# Patient Record
Sex: MALE | Race: WHITE | NOT HISPANIC OR LATINO | Employment: FULL TIME | URBAN - METROPOLITAN AREA
[De-identification: names, ages, dates, MRNs, and addresses within clinical notes are randomized per-mention and may not be internally consistent; named-entity substitution may affect disease eponyms.]

---

## 2018-04-16 RX ORDER — ATORVASTATIN CALCIUM 10 MG/1
10 TABLET, FILM COATED ORAL DAILY
COMMUNITY
Start: 2017-11-10 | End: 2018-04-16 | Stop reason: SDUPTHER

## 2018-04-17 RX ORDER — ATORVASTATIN CALCIUM 10 MG/1
10 TABLET, FILM COATED ORAL DAILY
Qty: 90 TABLET | Refills: 3 | Status: SHIPPED | OUTPATIENT
Start: 2018-04-17 | End: 2019-05-13 | Stop reason: SDUPTHER

## 2018-04-20 PROBLEM — K21.9 GERD (GASTROESOPHAGEAL REFLUX DISEASE): Status: ACTIVE | Noted: 2018-04-20

## 2018-04-20 PROBLEM — R79.83 HYPERHOMOCYSTINEMIA: Status: ACTIVE | Noted: 2018-04-20

## 2018-04-20 PROBLEM — E78.5 DYSLIPIDEMIA: Status: ACTIVE | Noted: 2018-04-20

## 2018-04-20 PROBLEM — I10 HYPERTENSION: Status: ACTIVE | Noted: 2018-04-20

## 2018-04-20 PROBLEM — D68.52 PROTHROMBIN MUTATION (CMS/HCC): Status: ACTIVE | Noted: 2018-04-20

## 2018-04-20 PROBLEM — G47.33 OSA (OBSTRUCTIVE SLEEP APNEA): Status: ACTIVE | Noted: 2018-04-20

## 2018-04-20 PROBLEM — F41.9 ANXIETY DISORDER: Status: ACTIVE | Noted: 2018-04-20

## 2018-04-20 PROBLEM — I25.10 CORONARY ARTERY DISEASE: Status: ACTIVE | Noted: 2018-04-20

## 2018-04-20 RX ORDER — OMEGA-3 FATTY ACIDS 1000 MG
CAPSULE ORAL DAILY
COMMUNITY
Start: 2017-11-01

## 2018-04-20 RX ORDER — CEPHRADINE 500 MG
200 CAPSULE ORAL DAILY
COMMUNITY
Start: 2017-12-13

## 2018-04-20 RX ORDER — ESCITALOPRAM OXALATE 5 MG/1
5 TABLET ORAL DAILY
COMMUNITY
Start: 2017-11-01 | End: 2018-09-12 | Stop reason: HOSPADM

## 2018-04-20 RX ORDER — LEVOMEFOLATE CALCIUM 15 MG
TABLET ORAL DAILY
COMMUNITY
End: 2019-10-01 | Stop reason: ALTCHOICE

## 2018-04-20 RX ORDER — CALCIUM CARBONATE 500(1250)
TABLET ORAL DAILY
COMMUNITY
Start: 2017-11-01 | End: 2020-10-21 | Stop reason: ALTCHOICE

## 2018-04-20 RX ORDER — PANTOPRAZOLE SODIUM 40 MG/1
40 TABLET, DELAYED RELEASE ORAL DAILY
COMMUNITY
Start: 2017-11-01

## 2018-04-20 RX ORDER — ASPIRIN 81 MG/1
81 TABLET ORAL DAILY
COMMUNITY
Start: 2016-12-12

## 2018-04-20 RX ORDER — CHOLECALCIFEROL (VITAMIN D3) 50 MCG
TABLET ORAL DAILY
COMMUNITY
Start: 2017-11-01

## 2018-04-20 RX ORDER — PRENATAL VIT 75/IRON/FOLIC/OM3 28-800-440
250 COMBINATION PACKAGE (EA) ORAL DAILY
COMMUNITY
Start: 2017-12-13

## 2018-04-20 RX ORDER — ACETAMINOPHEN 160 MG/5ML
200 SUSPENSION, ORAL (FINAL DOSE FORM) ORAL DAILY
COMMUNITY
Start: 2017-11-01

## 2018-04-20 NOTE — PROGRESS NOTES
Patient MRN:  [id number]      Patient MRN: [id number]  Date: 2017  Description: Prog Notes ([facility]) Cardiology  Category: Progress Notes ([facility])   Provider ID: [id number]  Practice ID: 0001  E.tdnterprise ID:   [id number]  Jaylon Irvin MD  [address] [address]  [place] [address]    Re:  HILDA LOMAX  :  1955    Dear Jaylon:    It was my pleasure to see your patient, Hilda Lomax, in the [facility] today.  As you know, he was referred for follow-up therapy concerning his subclinical coronary artery disease and dyslipidemia.    The patient has been through a previous cardiac workup by Dr. Santo Tucker at [place].  A coronary calcium score was obtained which revealed significant plaquing of his LAD.  This prompted a stress study which was equivocal for ischemia.  This was followed by a diagnostic left heart catheterization in 2016 which demonstrated moderate nonobstructive LAD disease with was FFR negative.  Medical management was indicated.    The patient has been on a prevention program over the past several years.  He is on Lipitor 10 mg daily.  His most recent lipid panel revealed a total cholesterol of total cholesterol 153 LDL 74 HDL 74 triglycerides 24.  His LP(a) is normal at 56.  This is an excellent response to the use of Lipitor 10 mg daily.    His bioinflammatory markers including HSCRP and LPPLA-2 are normal.  His endothelial function is normal.  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  He has a prothrombin mutation which increases his risk for venous thrombosis.  His homocystine level is elevated at 14.  There is no evidence of insulin resistance.  His ferritin levels are low.  His omega-3 index is 11.8.    We reviewed his medications and supplements in detail and are making no substantial changes today.  He will continue on with prevention program as outlined  above.    PAST MEDICAL HISTORY:  Coronary artery disease, cardiac catheterization in November 2016, moderate stenosis involving the mid LAD, FFR negative, dyslipidemia, well controlled with Lipitor, gastroesophageal reflux disease, osteoarthritis, osteoporosis, basal cell carcinoma of the skin.    PAST SURGICAL HISTORY:  Retinal tear, prostate biopsy, hernia repair.    CURRENT MEDICATIONS:   Current Outpatient Prescriptions:   •  alpha lipoic acid 200 mg capsule, Take 200 mg by mouth once daily., Disp: , Rfl:   •  aspirin (ASPIR-81) 81 mg enteric coated tablet, Take 81 mg by mouth once daily., Disp: , Rfl:   •  atorvastatin (LIPITOR) 10 mg tablet, Take 1 tablet (10 mg total) by mouth daily., Disp: 90 tablet, Rfl: 3  •  cholecalciferol, vitamin D3, (VITAMIN D3) 2,000 unit tablet, Take by mouth daily., Disp: , Rfl:   •  coenzyme Q10 (CO Q-10) 200 mg capsule, Take 200 mg by mouth once daily., Disp: , Rfl:   •  Lactobac no.41-Bifidobact no.7 (PROBIOTIC-10) 70 mg (3 billion cell) capsule, Take by mouth daily., Disp: , Rfl:   •  levomefolate calcium (L-METHYLFOLATE) 15 mg tablet, Take by mouth once daily., Disp: , Rfl:   •  MULTIVIT WITH MINERALS/LUTEIN (MULTIVITAMIN 50 PLUS ORAL), Take by mouth once daily., Disp: , Rfl:   •  omega-3 fatty acids 1,000 mg capsule, Take by mouth once daily. Two capsules daily, Disp: , Rfl:   •  pantoprazole (PROTONIX) 40 mg EC tablet, Take 40 mg by mouth daily., Disp: , Rfl:   •  resveratrol 250 mg capsule, 250 mg., Disp: , Rfl:   •  calcium carbonate (CALCIUM 500) 500 mg calcium (1,250 mg) tablet, Take by mouth once daily., Disp: , Rfl:   •  escitalopram (LEXAPRO) 5 mg tablet, Take 5 mg by mouth once daily., Disp: , Rfl:       SUPPLEMENTS:  Multivitamin, omega-3 fish oil, vitamin-D, Reservitol, methyl-B essentials, probiotic, coenzyme-Q10, lipoic acid, calcium.  We reviewed his supplements in detail today.    ALLERGIES:  No known drug allergies.    FAMILY HISTORY:  Mother with  hypertension, congestive heart failure.  Father with myocardial infarction, hypertension.    SOCIAL HISTORY:  He is .  His wife's name is Lakeshia Presley.  He has one child.  He is an , self-employed.  He does not smoke.  Rare alcohol.  Regular exercise.    REVIEW OF SYSTEMS:  The patient has snoring, uses nasal CPAP, so he does have sleep apnea, GERD, reflux.  He has been on proton pump inhibitors for 15 years.  The remainder of his 12 point review of systems is negative.    PHYSICAL EXAMINATION:  The patient is a middle-aged male in no acute distress.    Weight:  167.  BMI:  28.  Blood pressure: 140/80  HEENT:  Unremarkable.  Neck:  Supple, no JVD.  Lungs:  Clear to auscultation and percussion.  Cardiac:  Regular rate and rhythm.  Abdomen:  Soft, bowel sounds present, no organomegaly.  Extremities:  No edema, pulses intact.  Skin:  Warm and dry.  Neuro:  Alert and oriented X 3.    LABORATORY DATA:      EKG:  Normal sinus rhythm, normal EKG.      Lipid panel:  Total cholesterol is 153 LDL 74 HDL 74 triglycerides 24.       IMPRESSIONS/RECOMMENDATIONS:  1. Coronary artery disease.  The patient has had prior cardiac catheterization which demonstrated nonobstructive LAD coronary artery disease.  The patient needs an aggressive risk factor modification program.  He will continue baby aspirin.  2. Dyslipidemia.  Continue Lipitor.  He has reached goal.  3. 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  The patient is at increased risk for progressive atherosclerosis.  4. Prothrombin mutation.  The patient is at increased risk of DVT.  5. Hyperhomocystinemia.  The patient should continue with B-complex and L5 methyl folate.  6. Obstructive sleep apnea.  The patient will continue nasal CPAP.  7. Borderline hypertension.  8. Anxiety disorder.  9. GERD and irritable bowel syndrome.  The patient was instructed to read the book by Pierre Morales.    Summary: Dandy is demonstrating cardiovascular stability.  We reviewed his  prevention program in detail.  We are making no substantial changes today.    This was a 30 minute patient encounter with greater than 50% time spent in care coordination and counseling.    Sincerely,    Nnamdi Gustafson MD  4/23/2018

## 2018-04-23 ENCOUNTER — OFFICE VISIT (OUTPATIENT)
Dept: CARDIOLOGY | Facility: CLINIC | Age: 63
End: 2018-04-23
Attending: INTERNAL MEDICINE
Payer: COMMERCIAL

## 2018-04-23 VITALS
BODY MASS INDEX: 24.73 KG/M2 | DIASTOLIC BLOOD PRESSURE: 80 MMHG | SYSTOLIC BLOOD PRESSURE: 142 MMHG | HEIGHT: 69 IN | WEIGHT: 167 LBS

## 2018-04-23 DIAGNOSIS — E78.5 DYSLIPIDEMIA: ICD-10-CM

## 2018-04-23 DIAGNOSIS — I10 HYPERTENSION, UNSPECIFIED TYPE: ICD-10-CM

## 2018-04-23 DIAGNOSIS — I25.10 CORONARY ARTERY DISEASE INVOLVING NATIVE CORONARY ARTERY OF NATIVE HEART WITHOUT ANGINA PECTORIS: Primary | ICD-10-CM

## 2018-04-23 PROCEDURE — 93000 ELECTROCARDIOGRAM COMPLETE: CPT | Performed by: INTERNAL MEDICINE

## 2018-04-23 PROCEDURE — 99214 OFFICE O/P EST MOD 30 MIN: CPT | Performed by: INTERNAL MEDICINE

## 2018-04-23 NOTE — LETTER
2018     Jaylon Irvin MD  [address] [facility] [address]  [facility] [address]    Patient: Hilda Lomax   YOB: 1955   Date of Visit: 2018       Dear Dr. Irvin:    Thank you for referring Hilda Lomax to me for evaluation. Below are my notes for this consultation.    If you have questions, please do not hesitate to call me. I look forward to following your patient along with you.         Sincerely,        Nnamdi Gustafson MD        CC: No Recipients  Nnamdi Gustafson MD  2018  5:31 PM  Sign at close encounter  Patient MRN:  [id number]      Patient MRN: [id number]  Date: 2017  Description: Prog Notes ([facility]) Cardiology  Category: Progress Notes ([facility])   Provider ID: [id number]  Practice ID: 0001  E.tdnterprise ID:   [id number]  Jaylon Irvin MD  [address]  [place] [address]    Re:  HILDA LOMAX  :  1955    Dear Jaylon:    It was my pleasure to see your patient, Hilda Lomax, in the [facility] today.  As you know, he was referred for follow-up therapy concerning his subclinical coronary artery disease and dyslipidemia.    The patient has been through a previous cardiac workup by Dr. Santo Tucker at [place].  A coronary calcium score was obtained which revealed significant plaquing of his LAD.  This prompted a stress study which was equivocal for ischemia.  This was followed by a diagnostic left heart catheterization in 2016 which demonstrated moderate nonobstructive LAD disease with was FFR negative.  Medical management was indicated.    The patient has been on a prevention program over the past several years.  He is on Lipitor 10 mg daily.  His most recent lipid panel revealed a total cholesterol of total cholesterol 153 LDL 74 HDL 74 triglycerides 24.  His LP(a) is normal at 56.  This is an excellent response to the use of Lipitor 10 mg daily.    His bioinflammatory markers  including HSCRP and LPPLA-2 are normal.  His endothelial function is normal.  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  He has a prothrombin mutation which increases his risk for venous thrombosis.  His homocystine level is elevated at 14.  There is no evidence of insulin resistance.  His ferritin levels are low.  His omega-3 index is 11.8.    We reviewed his medications and supplements in detail and are making no substantial changes today.  He will continue on with prevention program as outlined above.    PAST MEDICAL HISTORY:  Coronary artery disease, cardiac catheterization in November 2016, moderate stenosis involving the mid LAD, FFR negative, dyslipidemia, well controlled with Lipitor, gastroesophageal reflux disease, osteoarthritis, osteoporosis, basal cell carcinoma of the skin.    PAST SURGICAL HISTORY:  Retinal tear, prostate biopsy, hernia repair.    CURRENT MEDICATIONS:   Current Outpatient Prescriptions:   •  alpha lipoic acid 200 mg capsule, Take 200 mg by mouth once daily., Disp: , Rfl:   •  aspirin (ASPIR-81) 81 mg enteric coated tablet, Take 81 mg by mouth once daily., Disp: , Rfl:   •  atorvastatin (LIPITOR) 10 mg tablet, Take 1 tablet (10 mg total) by mouth daily., Disp: 90 tablet, Rfl: 3  •  cholecalciferol, vitamin D3, (VITAMIN D3) 2,000 unit tablet, Take by mouth daily., Disp: , Rfl:   •  coenzyme Q10 (CO Q-10) 200 mg capsule, Take 200 mg by mouth once daily., Disp: , Rfl:   •  Lactobac no.41-Bifidobact no.7 (PROBIOTIC-10) 70 mg (3 billion cell) capsule, Take by mouth daily., Disp: , Rfl:   •  levomefolate calcium (L-METHYLFOLATE) 15 mg tablet, Take by mouth once daily., Disp: , Rfl:   •  MULTIVIT WITH MINERALS/LUTEIN (MULTIVITAMIN 50 PLUS ORAL), Take by mouth once daily., Disp: , Rfl:   •  omega-3 fatty acids 1,000 mg capsule, Take by mouth once daily. Two capsules daily, Disp: , Rfl:   •  pantoprazole (PROTONIX) 40 mg EC tablet, Take  40 mg by mouth daily., Disp: , Rfl:   •  resveratrol 250 mg capsule, 250 mg., Disp: , Rfl:   •  calcium carbonate (CALCIUM 500) 500 mg calcium (1,250 mg) tablet, Take by mouth once daily., Disp: , Rfl:   •  escitalopram (LEXAPRO) 5 mg tablet, Take 5 mg by mouth once daily., Disp: , Rfl:       SUPPLEMENTS:  Multivitamin, omega-3 fish oil, vitamin-D, Reservitol, methyl-B essentials, probiotic, coenzyme-Q10, lipoic acid, calcium.  We reviewed his supplements in detail today.    ALLERGIES:  No known drug allergies.    FAMILY HISTORY:  Mother with hypertension, congestive heart failure.  Father with myocardial infarction, hypertension.    SOCIAL HISTORY:  He is .  His wife's name is Lakeshia Presley.  He has one child.  He is an , self-employed.  He does not smoke.  Rare alcohol.  Regular exercise.    REVIEW OF SYSTEMS:  The patient has snoring, uses nasal CPAP, so he does have sleep apnea, GERD, reflux.  He has been on proton pump inhibitors for 15 years.  The remainder of his 12 point review of systems is negative.    PHYSICAL EXAMINATION:  The patient is a middle-aged male in no acute distress.    Weight:  167.  BMI:  28.  Blood pressure: 140/80  HEENT:  Unremarkable.  Neck:  Supple, no JVD.  Lungs:  Clear to auscultation and percussion.  Cardiac:  Regular rate and rhythm.  Abdomen:  Soft, bowel sounds present, no organomegaly.  Extremities:  No edema, pulses intact.  Skin:  Warm and dry.  Neuro:  Alert and oriented X 3.    LABORATORY DATA:      EKG:  Normal sinus rhythm, normal EKG.      Lipid panel:  Total cholesterol is 153 LDL 74 HDL 74 triglycerides 24.       IMPRESSIONS/RECOMMENDATIONS:  1. Coronary artery disease.  The patient has had prior cardiac catheterization which demonstrated nonobstructive LAD coronary artery disease.  The patient needs an aggressive risk factor modification program.  He will continue baby aspirin.  2. Dyslipidemia.  Continue Lipitor.  He has reached  goal.  3. 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  The patient is at increased risk for progressive atherosclerosis.  4. Prothrombin mutation.  The patient is at increased risk of DVT.  5. Hyperhomocystinemia.  The patient should continue with B-complex and L5 methyl folate.  6. Obstructive sleep apnea.  The patient will continue nasal CPAP.  7. Borderline hypertension.  8. Anxiety disorder.  9. GERD and irritable bowel syndrome.  The patient was instructed to read the book by Pierre Morales.    Summary: Dandy is demonstrating cardiovascular stability.  We reviewed his prevention program in detail.  We are making no substantial changes today.    This was a 30 minute patient encounter with greater than 50% time spent in care coordination and counseling.    Sincerely,    Nnamdi Gustafson MD  4/23/2018

## 2018-05-18 ENCOUNTER — TELEPHONE (OUTPATIENT)
Dept: CARDIOLOGY | Facility: CLINIC | Age: 63
End: 2018-05-18

## 2018-05-18 NOTE — TELEPHONE ENCOUNTER
Pt had labs done 3 weeks ago, results do not look like they are back yet. Pt states 3 weeks as of yesterday.  Would like results mailed to home.  Thank you!

## 2018-07-18 ENCOUNTER — TELEPHONE (OUTPATIENT)
Dept: PRIMARY CARE | Facility: CLINIC | Age: 63
End: 2018-07-18

## 2018-07-18 RX ORDER — ZOLPIDEM TARTRATE 10 MG/1
10 TABLET ORAL NIGHTLY PRN
Qty: 10 TABLET | Refills: 0 | Status: SHIPPED | OUTPATIENT
Start: 2018-07-18 | End: 2019-01-09

## 2018-07-18 NOTE — TELEPHONE ENCOUNTER
Pt wanted to know if he could have ambien called in for him as he will be traveling this Friday to help with the plane ride.  Pt uses the [facility] on file.

## 2018-07-27 ENCOUNTER — TRANSCRIBE ORDERS (OUTPATIENT)
Dept: SCHEDULING | Age: 63
End: 2018-07-27

## 2018-07-27 DIAGNOSIS — M48.062 SPINAL STENOSIS OF LUMBAR REGION WITH NEUROGENIC CLAUDICATION: Primary | ICD-10-CM

## 2018-07-28 ENCOUNTER — HOSPITAL ENCOUNTER (OUTPATIENT)
Dept: RADIOLOGY | Facility: HOSPITAL | Age: 63
Discharge: HOME | End: 2018-07-28
Attending: PHYSICAL MEDICINE & REHABILITATION
Payer: COMMERCIAL

## 2018-07-28 DIAGNOSIS — M48.062 SPINAL STENOSIS OF LUMBAR REGION WITH NEUROGENIC CLAUDICATION: ICD-10-CM

## 2018-07-28 PROCEDURE — 72148 MRI LUMBAR SPINE W/O DYE: CPT

## 2018-08-23 ENCOUNTER — TRANSCRIBE ORDERS (OUTPATIENT)
Dept: SCHEDULING | Age: 63
End: 2018-08-23

## 2018-08-23 DIAGNOSIS — N28.1 ACQUIRED CYST OF KIDNEY: Primary | ICD-10-CM

## 2018-09-04 ENCOUNTER — HOSPITAL ENCOUNTER (OUTPATIENT)
Dept: RADIOLOGY | Age: 63
Discharge: HOME | End: 2018-09-04
Attending: UROLOGY
Payer: COMMERCIAL

## 2018-09-04 DIAGNOSIS — N28.1 ACQUIRED CYST OF KIDNEY: ICD-10-CM

## 2018-09-04 PROCEDURE — 76775 US EXAM ABDO BACK WALL LIM: CPT

## 2018-09-11 ENCOUNTER — TRANSCRIBE ORDERS (OUTPATIENT)
Dept: SCHEDULING | Age: 63
End: 2018-09-11

## 2018-09-11 DIAGNOSIS — D41.02 NEOPLASM OF UNCERTAIN BEHAVIOR OF LEFT KIDNEY: Primary | ICD-10-CM

## 2018-09-12 ENCOUNTER — TELEPHONE (OUTPATIENT)
Dept: TRANSFER UNIT | Age: 63
End: 2018-09-12

## 2018-09-14 ENCOUNTER — HOSPITAL ENCOUNTER (OUTPATIENT)
Dept: RADIOLOGY | Age: 63
Discharge: HOME | End: 2018-09-14
Attending: UROLOGY
Payer: COMMERCIAL

## 2018-09-14 DIAGNOSIS — D41.02 NEOPLASM OF UNCERTAIN BEHAVIOR OF LEFT KIDNEY: ICD-10-CM

## 2018-09-14 RX ORDER — GADOBUTROL 604.72 MG/ML
7.5 INJECTION INTRAVENOUS ONCE
Status: COMPLETED | OUTPATIENT
Start: 2018-09-14 | End: 2018-09-14

## 2018-09-14 RX ADMIN — GADOBUTROL 7.5 ML: 604.72 INJECTION INTRAVENOUS at 13:26

## 2018-10-03 ENCOUNTER — TELEPHONE (OUTPATIENT)
Dept: CARDIOLOGY | Facility: CLINIC | Age: 63
End: 2018-10-03

## 2018-10-03 ENCOUNTER — TELEPHONE (OUTPATIENT)
Dept: SCHEDULING | Facility: CLINIC | Age: 63
End: 2018-10-03

## 2018-10-03 NOTE — TELEPHONE ENCOUNTER
Dr Gustafson pt call re appt.and labs  Back in April. Per pt did not have referral asking if visit can be checked pt states visits were not covered. Please call pt to discuss   P# [phone]. Pt concern w/ upcoming OV and next labs to be done.

## 2018-10-03 NOTE — TELEPHONE ENCOUNTER
Pt requesting assistance stating he needs approval to have his labs done at [facility]. He is normally capitated to [facility]

## 2018-10-04 NOTE — TELEPHONE ENCOUNTER
Left message. Specialty pharmacy is sear[facility] than his capitated pharmacy he cannot get blood work done at [facility]

## 2018-10-24 ENCOUNTER — TELEPHONE (OUTPATIENT)
Dept: SCHEDULING | Facility: CLINIC | Age: 63
End: 2018-10-24

## 2018-10-25 NOTE — PROGRESS NOTES
A    Advanced  A1  Jaylon Irvin MD  [address]  [address] [place] [address]    Re:  DANDY LOMAX  :  1955    Dear Jaylon:    It was my pleasure to see your patient, Dandy Lomax, in the [facility] today.  As you know, he was referred for follow-up therapy concerning his subclinical coronary artery disease and dyslipidemia.    The patient has been through a previous cardiac workup by Dr. Santo Tucker at [place].  A coronary calcium score was obtained which revealed significant plaquing of his LAD.  This prompted a stress study which was equivocal for ischemia.  This was followed by a diagnostic left heart catheterization in 2016 which demonstrated moderate nonobstructive LAD disease with was FFR negative.  Medical management was indicated.    The patient has been on a prevention program over the past several years.  He is on Lipitor 10 mg daily.  His most recent lipid panel revealed a total cholesterol of total cholesterol 165 LDL 75 HDL 76 triglycerides 32 APO B 75..  His LP(a) is normal.  This is an excellent response to the use of Lipitor 10 mg daily.    His bioinflammatory markers including HSCRP and LPPLA-2 are normal.  His endothelial function is normal.  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  He has a prothrombin mutation which increases his risk for venous thrombosis.  His homocystine level is elevated at 11.  There is no evidence of insulin resistance.  His ferritin levels are low.  His omega-3 index is 10.9.    We reviewed his medications and supplements in detail and are making no substantial changes today.  He will continue on with prevention program as outlined above.    PAST MEDICAL HISTORY:    1. Coronary artery disease, cardiac catheterization in 2016, moderate stenosis involving the mid LAD, FFR negative  2. dyslipidemia, well controlled with Lipitor   3.  gastroesophageal reflux disease  4. osteoarthritis, osteoporosis  5.  basal cell carcinoma of the skin.  6.  Right bundle branch block  7.  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    CURRENT MEDICATIONS:     Current Outpatient Prescriptions:   •  alpha lipoic acid 200 mg capsule, Take 200 mg by mouth once daily., Disp: , Rfl:   •  aspirin (ASPIR-81) 81 mg enteric coated tablet, Take 81 mg by mouth once daily., Disp: , Rfl:   •  atorvastatin (LIPITOR) 10 mg tablet, Take 1 tablet (10 mg total) by mouth daily., Disp: 90 tablet, Rfl: 3  •  calcium carbonate (CALCIUM 500) 500 mg calcium (1,250 mg) tablet, Take by mouth once daily., Disp: , Rfl:   •  cholecalciferol, vitamin D3, (VITAMIN D3) 2,000 unit tablet, Take by mouth daily., Disp: , Rfl:   •  coenzyme Q10 (CO Q-10) 200 mg capsule, Take 200 mg by mouth once daily., Disp: , Rfl:   •  Lactobac no.41-Bifidobact no.7 (PROBIOTIC-10) 70 mg (3 billion cell) capsule, Take by mouth daily., Disp: , Rfl:   •  levomefolate calcium (L-METHYLFOLATE) 15 mg tablet, Take by mouth once daily., Disp: , Rfl:   •  MULTIVIT WITH MINERALS/LUTEIN (MULTIVITAMIN 50 PLUS ORAL), Take by mouth once daily., Disp: , Rfl:   •  omega-3 fatty acids 1,000 mg capsule, Take by mouth once daily. Two capsules daily, Disp: , Rfl:   •  pantoprazole (PROTONIX) 40 mg EC tablet, Take 40 mg by mouth daily. Two capsule every three days , Disp: , Rfl:   •  resveratrol 250 mg capsule, Take 250 mg by mouth daily.  , Disp: , Rfl:   •  zolpidem (AMBIEN) 10 mg tablet, Take 1 tablet (10 mg total) by mouth nightly as needed for sleep., Disp: 10 tablet, Rfl: 0        SUPPLEMENTS:  Multivitamin, omega-3 fish oil, vitamin-D, Reservitol, methyl-B essentials, probiotic, coenzyme-Q10, lipoic acid, calcium.  We reviewed his supplements in detail today.    ALLERGIES:  No known drug allergies.    FAMILY HISTORY:  Mother with hypertension, congestive heart failure.  Father with  myocardial infarction, hypertension.    SOCIAL HISTORY:  He is .  His wife's name is Lakeshia Presley.  He has one child.  He is an , self-employed.  He does not smoke.  Rare alcohol.  Regular exercise.    REVIEW OF SYSTEMS:  The patient has snoring, uses nasal CPAP, so he does have sleep apnea, GERD, reflux.  He has been on proton pump inhibitors for 15 years.  The remainder of his 12 point review of systems is negative.    PHYSICAL EXAMINATION:  The patient is a middle-aged male in no acute distress.    Weight:  160.  BMI:  28.  Blood pressure: 140/80  HEENT:  Unremarkable.  Neck:  Supple, no JVD.  Lungs:  Clear to auscultation and percussion.  Cardiac:  Regular rate and rhythm.  Abdomen:  Soft, bowel sounds present, no organomegaly.  Extremities:  No edema, pulses intact.  Skin:  Warm and dry.  Neuro:  Alert and oriented X 3.    LABORATORY DATA:      EKG:  Normal sinus rhythm, RBBB.      Lipid panel:  Total cholesterol is 165 LDL 75 HDL 76 for glycerides 32 APO B 75.      IMPRESSIONS/RECOMMENDATIONS:  1. Coronary artery disease.  The patient has had prior cardiac catheterization which demonstrated nonobstructive LAD coronary artery disease.  The patient needs an aggressive risk factor modification program.  He will continue baby aspirin.  2. Dyslipidemia.  Continue Lipitor.  He has reached goal.  3. 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  The patient is at increased risk for progressive atherosclerosis.  4. Prothrombin mutation.  The patient is at increased risk of DVT.  5. Hyperhomocystinemia.  The patient should continue with B-complex and L5 methyl folate.  6. Obstructive sleep apnea.  The patient will continue nasal CPAP.  7. Borderline hypertension.  Patient will monitor his blood pressures at home.  Our goal is a blood pressure less than 130/80.  8. Anxiety disorder.  9. GERD and irritable bowel syndrome.  The patient was instructed to read the book by Pierre Morales.  He is complaining of  constipation.    Summary: Dandy is demonstrating cardiovascular stability.  We reviewed his prevention program in detail.  We are making no substantial changes today.    This was a 30 minute patient encounter with greater than 50% time spent in care coordination and counseling.    Sincerely,    Nnamdi Gustafson MD  10/29/2018

## 2018-10-29 ENCOUNTER — OFFICE VISIT (OUTPATIENT)
Dept: CARDIOLOGY | Facility: CLINIC | Age: 63
End: 2018-10-29
Payer: COMMERCIAL

## 2018-10-29 VITALS
WEIGHT: 160 LBS | DIASTOLIC BLOOD PRESSURE: 80 MMHG | HEIGHT: 69 IN | BODY MASS INDEX: 23.7 KG/M2 | SYSTOLIC BLOOD PRESSURE: 142 MMHG

## 2018-10-29 DIAGNOSIS — I25.10 CORONARY ARTERY DISEASE INVOLVING NATIVE CORONARY ARTERY OF NATIVE HEART WITHOUT ANGINA PECTORIS: Primary | ICD-10-CM

## 2018-10-29 DIAGNOSIS — I10 HYPERTENSION, UNSPECIFIED TYPE: ICD-10-CM

## 2018-10-29 DIAGNOSIS — E78.5 DYSLIPIDEMIA: ICD-10-CM

## 2018-10-29 PROCEDURE — 99214 OFFICE O/P EST MOD 30 MIN: CPT | Performed by: INTERNAL MEDICINE

## 2018-10-29 PROCEDURE — 93000 ELECTROCARDIOGRAM COMPLETE: CPT | Performed by: INTERNAL MEDICINE

## 2018-10-29 NOTE — LETTER
2018     Jaylon Irvin MD  [address]  [facility] [place] [address]    Patient: Hilda Lomax   YOB: 1955   Date of Visit: 10/29/2018       Dear Dr. Irvin:    Thank you for referring Hilda Lomax to me for evaluation. Below are my notes for this consultation.    If you have questions, please do not hesitate to call me. I look forward to following your patient along with you.         Sincerely,        Nnamdi Gustafson MD        CC: No Recipients  Nnamdi Gustafson MD  10/29/2018  3:48 PM  Sign at close encounter  A    Advanced  A1  Jaylon Irvin MD  [facility]  [address]  [place] [address]    Re:  HILDA LOMAX  :  1955    Dear Jaylon:    It was my pleasure to see your patient, Hilda Lomax, in the [facility] today.  As you know, he was referred for follow-up therapy concerning his subclinical coronary artery disease and dyslipidemia.    The patient has been through a previous cardiac workup by Dr. Santo Tucker at [facility].  A coronary calcium score was obtained which revealed significant plaquing of his LAD.  This prompted a stress study which was equivocal for ischemia.  This was followed by a diagnostic left heart catheterization in 2016 which demonstrated moderate nonobstructive LAD disease with was FFR negative.  Medical management was indicated.    The patient has been on a prevention program over the past several years.  He is on Lipitor 10 mg daily.  His most recent lipid panel revealed a total cholesterol of total cholesterol 165 LDL 75 HDL 76 triglycerides 32 APO B 75..  His LP(a) is normal.  This is an excellent response to the use of Lipitor 10 mg daily.    His bioinflammatory markers including HSCRP and LPPLA-2 are normal.  His endothelial function is normal.  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  He has a prothrombin mutation  which increases his risk for venous thrombosis.  His homocystine level is elevated at 11.  There is no evidence of insulin resistance.  His ferritin levels are low.  His omega-3 index is 10.9.    We reviewed his medications and supplements in detail and are making no substantial changes today.  He will continue on with prevention program as outlined above.    PAST MEDICAL HISTORY:    1. Coronary artery disease, cardiac catheterization in November 2016, moderate stenosis involving the mid LAD, FFR negative  2. dyslipidemia, well controlled with Lipitor   3. gastroesophageal reflux disease  4. osteoarthritis, osteoporosis  5.  basal cell carcinoma of the skin.  6.  Right bundle branch block  7.  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    CURRENT MEDICATIONS:     Current Outpatient Prescriptions:   •  alpha lipoic acid 200 mg capsule, Take 200 mg by mouth once daily., Disp: , Rfl:   •  aspirin (ASPIR-81) 81 mg enteric coated tablet, Take 81 mg by mouth once daily., Disp: , Rfl:   •  atorvastatin (LIPITOR) 10 mg tablet, Take 1 tablet (10 mg total) by mouth daily., Disp: 90 tablet, Rfl: 3  •  calcium carbonate (CALCIUM 500) 500 mg calcium (1,250 mg) tablet, Take by mouth once daily., Disp: , Rfl:   •  cholecalciferol, vitamin D3, (VITAMIN D3) 2,000 unit tablet, Take by mouth daily., Disp: , Rfl:   •  coenzyme Q10 (CO Q-10) 200 mg capsule, Take 200 mg by mouth once daily., Disp: , Rfl:   •  Lactobac no.41-Bifidobact no.7 (PROBIOTIC-10) 70 mg (3 billion cell) capsule, Take by mouth daily., Disp: , Rfl:   •  levomefolate calcium (L-METHYLFOLATE) 15 mg tablet, Take by mouth once daily., Disp: , Rfl:   •  MULTIVIT WITH MINERALS/LUTEIN (MULTIVITAMIN 50 PLUS ORAL), Take by mouth once daily., Disp: , Rfl:   •  omega-3 fatty acids 1,000 mg capsule, Take by mouth once daily. Two capsules daily, Disp: , Rfl:   •  pantoprazole (PROTONIX) 40 mg EC tablet, Take 40 mg by mouth daily.  Two capsule every three days , Disp: , Rfl:   •  resveratrol 250 mg capsule, Take 250 mg by mouth daily.  , Disp: , Rfl:   •  zolpidem (AMBIEN) 10 mg tablet, Take 1 tablet (10 mg total) by mouth nightly as needed for sleep., Disp: 10 tablet, Rfl: 0        SUPPLEMENTS:  Multivitamin, omega-3 fish oil, vitamin-D, Reservitol, methyl-B essentials, probiotic, coenzyme-Q10, lipoic acid, calcium.  We reviewed his supplements in detail today.    ALLERGIES:  No known drug allergies.    FAMILY HISTORY:  Mother with hypertension, congestive heart failure.  Father with myocardial infarction, hypertension.    SOCIAL HISTORY:  He is .  His wife's name is Lakeshia Presley.  He has one child.  He is an , self-employed.  He does not smoke.  Rare alcohol.  Regular exercise.    REVIEW OF SYSTEMS:  The patient has snoring, uses nasal CPAP, so he does have sleep apnea, GERD, reflux.  He has been on proton pump inhibitors for 15 years.  The remainder of his 12 point review of systems is negative.    PHYSICAL EXAMINATION:  The patient is a middle-aged male in no acute distress.    Weight:  160.  BMI:  28.  Blood pressure: 140/80  HEENT:  Unremarkable.  Neck:  Supple, no JVD.  Lungs:  Clear to auscultation and percussion.  Cardiac:  Regular rate and rhythm.  Abdomen:  Soft, bowel sounds present, no organomegaly.  Extremities:  No edema, pulses intact.  Skin:  Warm and dry.  Neuro:  Alert and oriented X 3.    LABORATORY DATA:      EKG:  Normal sinus rhythm, RBBB.      Lipid panel:  Total cholesterol is 165 LDL 75 HDL 76 for glycerides 32 APO B 75.      IMPRESSIONS/RECOMMENDATIONS:  1. Coronary artery disease.  The patient has had prior cardiac catheterization which demonstrated nonobstructive LAD coronary artery disease.  The patient needs an aggressive risk factor modification program.  He will continue baby aspirin.  2. Dyslipidemia.  Continue Lipitor.  He has reached goal.  3. 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  The patient  is at increased risk for progressive atherosclerosis.  4. Prothrombin mutation.  The patient is at increased risk of DVT.  5. Hyperhomocystinemia.  The patient should continue with B-complex and L5 methyl folate.  6. Obstructive sleep apnea.  The patient will continue nasal CPAP.  7. Borderline hypertension.  Patient will monitor his blood pressures at home.  Our goal is a blood pressure less than 130/80.  8. Anxiety disorder.  9. GERD and irritable bowel syndrome.  The patient was instructed to read the book by Pierre Morales.  He is complaining of constipation.    Summary: Dandy is demonstrating cardiovascular stability.  We reviewed his prevention program in detail.  We are making no substantial changes today.    This was a 30 minute patient encounter with greater than 50% time spent in care coordination and counseling.    Sincerely,    Nnamdi Gustafson MD  10/29/2018

## 2018-10-29 NOTE — TELEPHONE ENCOUNTER
Dr Gustafson pt request THD results emailed to pt . Pt. Has  appt. Today wants to see results prior to OV Pt. contact [phone]

## 2018-11-21 ENCOUNTER — TELEPHONE (OUTPATIENT)
Dept: PRIMARY CARE | Facility: CLINIC | Age: 63
End: 2018-11-21

## 2018-11-21 DIAGNOSIS — Z00.00 PHYSICAL EXAM, ANNUAL: Primary | ICD-10-CM

## 2019-01-03 LAB
BASOPHILS # BLD AUTO: 0.1 X10E3/UL (ref 0–0.2)
BASOPHILS NFR BLD AUTO: 1 %
EOSINOPHIL # BLD AUTO: 0.1 X10E3/UL (ref 0–0.4)
EOSINOPHIL NFR BLD AUTO: 2 %
ERYTHROCYTE [DISTWIDTH] IN BLOOD BY AUTOMATED COUNT: 13.2 % (ref 12.3–15.4)
HCT VFR BLD AUTO: 42.9 % (ref 37.5–51)
HGB BLD-MCNC: 14.6 G/DL (ref 13–17.7)
IMM GRANULOCYTES # BLD: 0 X10E3/UL (ref 0–0.1)
IMM GRANULOCYTES NFR BLD: 0 %
LYMPHOCYTES # BLD AUTO: 1.1 X10E3/UL (ref 0.7–3.1)
LYMPHOCYTES NFR BLD AUTO: 23 %
MCH RBC QN AUTO: 29.8 PG (ref 26.6–33)
MCHC RBC AUTO-ENTMCNC: 34 G/DL (ref 31.5–35.7)
MCV RBC AUTO: 88 FL (ref 79–97)
MONOCYTES # BLD AUTO: 0.4 X10E3/UL (ref 0.1–0.9)
MONOCYTES NFR BLD AUTO: 8 %
NEUTROPHILS # BLD AUTO: 3 X10E3/UL (ref 1.4–7)
NEUTROPHILS NFR BLD AUTO: 66 %
PLATELET # BLD AUTO: 231 X10E3/UL (ref 150–379)
RBC # BLD AUTO: 4.9 X10E6/UL (ref 4.14–5.8)
WBC # BLD AUTO: 4.5 X10E3/UL (ref 3.4–10.8)

## 2019-01-04 LAB
ALBUMIN SERPL-MCNC: 4.6 G/DL (ref 3.6–4.8)
ALBUMIN/GLOB SERPL: 2.4 {RATIO} (ref 1.2–2.2)
ALP SERPL-CCNC: 59 IU/L (ref 39–117)
ALT SERPL-CCNC: 19 IU/L (ref 0–44)
APPEARANCE UR: CLEAR
AST SERPL-CCNC: 23 IU/L (ref 0–40)
BACTERIA #/AREA URNS HPF: NORMAL /[HPF]
BILIRUB SERPL-MCNC: 0.6 MG/DL (ref 0–1.2)
BILIRUB UR QL STRIP: NEGATIVE
BUN SERPL-MCNC: 14 MG/DL (ref 8–27)
BUN/CREAT SERPL: 13 (ref 10–24)
CALCIUM SERPL-MCNC: 9.4 MG/DL (ref 8.6–10.2)
CHLORIDE SERPL-SCNC: 100 MMOL/L (ref 96–106)
CHOLEST SERPL-MCNC: 141 MG/DL (ref 100–199)
CO2 SERPL-SCNC: 28 MMOL/L (ref 20–29)
COLOR UR: YELLOW
CREAT SERPL-MCNC: 1.06 MG/DL (ref 0.76–1.27)
EPI CELLS #/AREA URNS HPF: NORMAL /HPF
GLOBULIN SER CALC-MCNC: 1.9 G/DL (ref 1.5–4.5)
GLUCOSE SERPL-MCNC: 94 MG/DL (ref 65–99)
GLUCOSE UR QL: NEGATIVE
HDLC SERPL-MCNC: 76 MG/DL
HGB UR QL STRIP: NEGATIVE
KETONES UR QL STRIP: NEGATIVE
LAB CORP EGFR IF AFRICN AM: 85 ML/MIN/1.73
LAB CORP EGFR IF NONAFRICN AM: 74 ML/MIN/1.73
LDLC SERPL CALC-MCNC: 58 MG/DL (ref 0–99)
LEUKOCYTE ESTERASE UR QL STRIP: NEGATIVE
MICRO URNS: (no result)
MICRO URNS: (no result)
MUCOUS THREADS URNS QL MICRO: PRESENT
NITRITE UR QL STRIP: NEGATIVE
PH UR STRIP: 8 [PH] (ref 5–7.5)
POTASSIUM SERPL-SCNC: 4.8 MMOL/L (ref 3.5–5.2)
PROT SERPL-MCNC: 6.5 G/DL (ref 6–8.5)
PROT UR QL STRIP: NEGATIVE
PSA SERPL-MCNC: 2.1 NG/ML (ref 0–4)
RBC #/AREA URNS HPF: NORMAL /HPF
SODIUM SERPL-SCNC: 139 MMOL/L (ref 134–144)
SP GR UR: 1.01 (ref 1–1.03)
TRIGL SERPL-MCNC: 36 MG/DL (ref 0–149)
UROBILINOGEN UR STRIP-MCNC: 0.2 EU/DL (ref 0.2–1)
VLDLC SERPL CALC-MCNC: 7 MG/DL (ref 5–40)
WBC #/AREA URNS HPF: NORMAL /HPF

## 2019-01-09 ENCOUNTER — OFFICE VISIT (OUTPATIENT)
Dept: PRIMARY CARE | Facility: CLINIC | Age: 64
End: 2019-01-09
Attending: INTERNAL MEDICINE
Payer: COMMERCIAL

## 2019-01-09 VITALS
BODY MASS INDEX: 22.65 KG/M2 | WEIGHT: 161.8 LBS | SYSTOLIC BLOOD PRESSURE: 140 MMHG | OXYGEN SATURATION: 98 % | HEART RATE: 75 BPM | DIASTOLIC BLOOD PRESSURE: 80 MMHG | TEMPERATURE: 98.3 F | RESPIRATION RATE: 14 BRPM | HEIGHT: 71 IN

## 2019-01-09 DIAGNOSIS — Z00.00 PHYSICAL EXAM, ANNUAL: Primary | ICD-10-CM

## 2019-01-09 DIAGNOSIS — E78.5 DYSLIPIDEMIA: ICD-10-CM

## 2019-01-09 DIAGNOSIS — R59.9 ENLARGED LYMPH NODE: ICD-10-CM

## 2019-01-09 DIAGNOSIS — I10 ESSENTIAL HYPERTENSION: ICD-10-CM

## 2019-01-09 PROBLEM — G47.00 INSOMNIA: Status: ACTIVE | Noted: 2017-06-08

## 2019-01-09 PROBLEM — M65.341 TRIGGER RING FINGER OF RIGHT HAND: Status: ACTIVE | Noted: 2018-06-13

## 2019-01-09 PROBLEM — I83.93 VARICOSE VEINS OF LEGS: Status: ACTIVE | Noted: 2017-06-08

## 2019-01-09 PROBLEM — R79.89 LOW VITAMIN D LEVEL: Status: ACTIVE | Noted: 2017-06-08

## 2019-01-09 PROCEDURE — 99396 PREV VISIT EST AGE 40-64: CPT | Performed by: INTERNAL MEDICINE

## 2019-01-09 RX ORDER — ESCITALOPRAM OXALATE 10 MG/1
10 TABLET ORAL
Refills: 1 | COMMUNITY
Start: 2018-12-18 | End: 2019-10-01

## 2019-01-09 RX ORDER — ZOLPIDEM TARTRATE 10 MG/1
10 TABLET ORAL NIGHTLY PRN
Refills: 0 | COMMUNITY
Start: 2018-12-20 | End: 2019-11-18

## 2019-01-09 ASSESSMENT — ENCOUNTER SYMPTOMS
SHORTNESS OF BREATH: 0
ARTHRALGIAS: 0
VOMITING: 0
FLANK PAIN: 0
ABDOMINAL PAIN: 0
FEVER: 0
CONSTIPATION: 0
ADENOPATHY: 0
AGITATION: 0
COLOR CHANGE: 0
EYE PAIN: 0
BACK PAIN: 0
DIARRHEA: 0
CHILLS: 0
SORE THROAT: 0
NAUSEA: 0
DYSURIA: 0
DIZZINESS: 0
CHEST TIGHTNESS: 0
COUGH: 0
SLEEP DISTURBANCE: 0
PALPITATIONS: 0

## 2019-01-09 NOTE — PROGRESS NOTES
Subjective      Patient ID: Dandy Lomax is a 63 y.o. male.    HPI    For Physical Exam.  Had labs done already.  Reviewed with patient.  Follows Dr Rojas.  Getting Lexapro, Ativan and Ambien.  Not getting as much exercise.  Uses eliptical riana and weights.  Has an enlarged lymph node on the left side of his neck for the past 2 months.  Saw ENT and will be getting ultrasound.      BP well controlled.  BMI is normal.      Family history reviewed.     Does not smoke.  Drinks occasionally.     Vaccinations up to date.     Colonoscopy 1/2017. Repeat 5 years.  At [facility].     Eye Exam regularly.     Dentist regularly.     Dermatology -- Dr Reid.         The following have been reviewed and updated as appropriate in this visit:  Tobacco  Allergies  Meds  Problems  Med Hx  Surg Hx  Fam Hx  Soc Hx        Patient Active Problem List   Diagnosis   • Coronary artery disease   • Dyslipidemia   • Prothrombin mutation (CMS/HCC) (HCC)   • Hyperhomocystinemia (CMS/HCC) (HCC)   • MERY (obstructive sleep apnea)   • Hypertension   • Anxiety disorder   • GERD (gastroesophageal reflux disease)   • Age-related osteoporosis without current pathological fracture   • Varicose veins of legs   • Trigger ring finger of right hand   • Osteoporosis   • Low vitamin D level   • Knee pain, bilateral   • Insomnia   • Cyclic neutropenia (CMS/HCC)     History reviewed. No pertinent surgical history.  Family History   Problem Relation Age of Onset   • Hypertension Mother    • Heart failure Mother    • Uterine cancer Mother    • Heart attack Father    • Hypertension Father    • COPD Father    • No Known Problems Sister      Social History     Social History   • Marital status:      Spouse name: N/A   • Number of children: 1   • Years of education: N/A     Occupational History   •       Social History Main Topics   • Smoking status: Never Smoker   • Smokeless tobacco: Never Used   • Alcohol use Yes       Comment: Rare alcohol   • Drug use: Unknown   • Sexual activity: Not on file     Other Topics Concern   • Not on file     Social History Narrative    One son, 10 y/o.            Review of Systems   Constitutional: Negative for chills and fever.   HENT: Negative for hearing loss and sore throat.    Eyes: Negative for pain.   Respiratory: Negative for cough, chest tightness and shortness of breath.    Cardiovascular: Negative for chest pain and palpitations.   Gastrointestinal: Negative for abdominal pain, constipation, diarrhea, nausea and vomiting.   Endocrine: Negative for cold intolerance and polyuria.   Genitourinary: Negative for dysuria and flank pain.   Musculoskeletal: Negative for arthralgias and back pain.   Skin: Negative for color change.   Allergic/Immunologic: Negative for environmental allergies.   Neurological: Negative for dizziness.   Hematological: Negative for adenopathy.   Psychiatric/Behavioral: Negative for agitation and sleep disturbance.       No Known Allergies  Current Outpatient Prescriptions   Medication Sig Dispense Refill   • alpha lipoic acid 200 mg capsule Take 200 mg by mouth once daily.     • aspirin (ASPIR-81) 81 mg enteric coated tablet Take 81 mg by mouth once daily.     • atorvastatin (LIPITOR) 10 mg tablet Take 1 tablet (10 mg total) by mouth daily. 90 tablet 3   • calcium carbonate (CALCIUM 500) 500 mg calcium (1,250 mg) tablet Take by mouth once daily.     • cholecalciferol, vitamin D3, (VITAMIN D3) 2,000 unit tablet Take by mouth daily.     • coenzyme Q10 (CO Q-10) 200 mg capsule Take 200 mg by mouth once daily.     • escitalopram (LEXAPRO) 10 mg tablet Take 10 mg by mouth once daily.  1   • Lactobac no.41-Bifidobact no.7 (PROBIOTIC-10) 70 mg (3 billion cell) capsule Take by mouth daily.     • levomefolate calcium (L-METHYLFOLATE) 15 mg tablet Take by mouth once daily.     • MULTIVIT WITH MINERALS/LUTEIN (MULTIVITAMIN 50 PLUS ORAL) Take by mouth once daily.     • omega-3  "fatty acids 1,000 mg capsule Take by mouth once daily. Two capsules daily     • pantoprazole (PROTONIX) 40 mg EC tablet Take 40 mg by mouth daily. Two capsule every three days      • resveratrol 250 mg capsule Take 250 mg by mouth daily.       • zolpidem (AMBIEN) 10 mg tablet Take 10 mg by mouth nightly as needed. for sleep  0     No current facility-administered medications for this visit.        Objective   Vitals:    01/09/19 1412   BP: 140/80   Pulse: 75   Resp: 14   Temp: 36.8 °C (98.3 °F)   SpO2: 98%   Weight: 73.4 kg (161 lb 12.8 oz)   Height: 1.791 m (5' 10.5\")       Physical Exam   Constitutional: He is oriented to person, place, and time. He appears well-developed.   HENT:   Head: Normocephalic and atraumatic.   Nose: Nose normal.   Eyes: Conjunctivae and EOM are normal. Pupils are equal, round, and reactive to light.   Neck: Normal range of motion. Neck supple.   Cardiovascular: Normal rate and regular rhythm.    Pulmonary/Chest: Effort normal and breath sounds normal.   Abdominal: Soft. Bowel sounds are normal. There is no tenderness.   Musculoskeletal: Normal range of motion.   Neurological: He is alert and oriented to person, place, and time.   Skin: Skin is warm and dry.   Psychiatric: He has a normal mood and affect. Judgment normal.   Nursing note and vitals reviewed.      Assessment/Plan   Problem List Items Addressed This Visit     Dyslipidemia    Hypertension      Other Visit Diagnoses     Physical exam, annual    -  Primary    Enlarged lymph node          Routine blood work today.      Recommend regular exercise and healthy diet.     Pt will get ultrasound of neck for his lump down [address].          Jaylon Irvin MD    1/9/2019  "

## 2019-03-22 ENCOUNTER — TELEPHONE (OUTPATIENT)
Dept: CARDIOLOGY | Facility: CLINIC | Age: 64
End: 2019-03-22

## 2019-03-22 NOTE — TELEPHONE ENCOUNTER
PT called with question    Is taking Vitamin B supplement and wishes to know if he should take FOLATE.    Is taking 800 mcg of FOLATE daily and vitamins from old cardiologist    Wants guidance on what to take vitamin wise.    Believes he is taking too much folate.    PT can be reached at [phone]

## 2019-04-01 ENCOUNTER — TELEPHONE (OUTPATIENT)
Dept: SCHEDULING | Facility: CLINIC | Age: 64
End: 2019-04-01

## 2019-04-01 NOTE — TELEPHONE ENCOUNTER
Pt request THD order put in Epic to have done thurs.   Pt has appt 4/29 needs completing before OV. P#[phone]

## 2019-04-01 NOTE — TELEPHONE ENCOUNTER
Estephanie, please fill form for THD and call pt to let him know to come over to Lab and get it done.

## 2019-04-02 NOTE — TELEPHONE ENCOUNTER
Addendum:    Filled out THD form and it is in an envelope at the  in zone A mezzanine level in the heart pavilion.

## 2019-04-03 ENCOUNTER — TELEPHONE (OUTPATIENT)
Dept: SCHEDULING | Facility: CLINIC | Age: 64
End: 2019-04-03

## 2019-04-03 NOTE — TELEPHONE ENCOUNTER
Pt requesting to speak with the nurse regarding labs; pt would like further instruction for testing for fasting. Pt can be reached at .

## 2019-04-26 NOTE — PROGRESS NOTES
[facility]    2019  Jaylon Irvin MD  [address]  [address]  [place] [address]    Re:  HILDA LOMAX  :  1955    Dear Jaylon:    It was my pleasure to see your patient, Hilda Lomax, in the [facility] today.  As you know, he was referred for follow-up therapy concerning his subclinical coronary artery disease and dyslipidemia.    The patient has been through a previous cardiac workup by Dr. Santo Tucker at [facility].  A coronary calcium score was obtained which revealed significant plaquing of his LAD.  This prompted a stress study which was equivocal for ischemia.  This was followed by a diagnostic left heart catheterization in 2016 which demonstrated moderate nonobstructive LAD disease with was FFR negative.  Medical management was indicated.    The patient has been on a prevention program over the past several years.  He is on Lipitor 10 mg daily.  His most recent lipid panel revealed a Total cholesterol 153 LDL 72 HDL 77 triglycerides 30 APO B 72. His LP(a) is normal.  This is an excellent response to the use of Lipitor 10 mg daily.    His bioinflammatory markers including HSCRP and LPPLA-2 are normal.  His endothelial function is normal.  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  He has a prothrombin mutation which increases his risk for venous thrombosis.  His homocystine level is elevated at 11.  There is no evidence of insulin resistance.  His ferritin levels are low.  His omega-3 index is 10.9.    We reviewed his medications and supplements in detail and are making no substantial changes today.  He will continue on with prevention program as outlined above.    PAST MEDICAL HISTORY:    1. Coronary artery disease, cardiac catheterization in 2016, moderate stenosis involving the mid LAD, FFR negative  2. dyslipidemia, well controlled  with Lipitor   3. gastroesophageal reflux disease  4. osteoarthritis, osteoporosis  5.  basal cell carcinoma of the skin.  6.  Right bundle branch block  7.  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    CURRENT MEDICATIONS:     Current Outpatient Prescriptions:   •  alpha lipoic acid 200 mg capsule, Take 200 mg by mouth once daily., Disp: , Rfl:   •  aspirin (ASPIR-81) 81 mg enteric coated tablet, Take 81 mg by mouth once daily., Disp: , Rfl:   •  atorvastatin (LIPITOR) 10 mg tablet, Take 1 tablet (10 mg total) by mouth daily., Disp: 90 tablet, Rfl: 3  •  calcium carbonate (CALCIUM 500) 500 mg calcium (1,250 mg) tablet, Take by mouth once daily., Disp: , Rfl:   •  cholecalciferol, vitamin D3, (VITAMIN D3) 2,000 unit tablet, Take by mouth daily., Disp: , Rfl:   •  coenzyme Q10 (CO Q-10) 200 mg capsule, Take 200 mg by mouth once daily., Disp: , Rfl:   •  escitalopram (LEXAPRO) 10 mg tablet, Take 10 mg by mouth once daily., Disp: , Rfl: 1  •  Lactobac no.41-Bifidobact no.7 (PROBIOTIC-10) 70 mg (3 billion cell) capsule, Take by mouth daily., Disp: , Rfl:   •  levomefolate calcium (L-METHYLFOLATE) 15 mg tablet, Take by mouth once daily., Disp: , Rfl:   •  MULTIVIT WITH MINERALS/LUTEIN (MULTIVITAMIN 50 PLUS ORAL), Take by mouth once daily., Disp: , Rfl:   •  omega-3 fatty acids 1,000 mg capsule, Take by mouth once daily. Two capsules daily, Disp: , Rfl:   •  pantoprazole (PROTONIX) 40 mg EC tablet, Take 40 mg by mouth daily. Two capsule every three days , Disp: , Rfl:   •  resveratrol 250 mg capsule, Take 250 mg by mouth daily.  , Disp: , Rfl:   •  zolpidem (AMBIEN) 10 mg tablet, Take 10 mg by mouth nightly as needed. for sleep, Disp: , Rfl: 0  •  losartan (COZAAR) 50 mg tablet, Take 1 tablet (50 mg total) by mouth daily., Disp: 90 tablet, Rfl: 3    SUPPLEMENTS:  Multivitamin, omega-3 fish oil, vitamin-D, Reservitol, methyl-B essentials, probiotic, coenzyme-Q10, lipoic acid,  calcium.  We reviewed his supplements in detail today.    ALLERGIES:  No known drug allergies.    FAMILY HISTORY:  Mother with hypertension, congestive heart failure.  Father with myocardial infarction, hypertension.    SOCIAL HISTORY:  He is .  His wife's name is Lakeshia Presley.  He has one child.  He is an , self-employed.  He does not smoke.  Rare alcohol.  Regular exercise.    REVIEW OF SYSTEMS:  The patient has snoring, uses nasal CPAP, so he does have sleep apnea, GERD, reflux.  He has been on proton pump inhibitors for 15 years.  The remainder of his 12 point review of systems is negative.    PHYSICAL EXAMINATION:  The patient is a middle-aged male in no acute distress.    Weight:  162  BMI:  22.9.  Blood pressure: 150/90  HEENT:  Unremarkable.  Neck:  Supple, no JVD.  Lungs:  Clear to auscultation and percussion.  Cardiac:  Regular rate and rhythm.  Abdomen:  Soft, bowel sounds present, no organomegaly.  Extremities:  No edema, pulses intact.  Skin:  Warm and dry.  Neuro:  Alert and oriented X 3.    LABORATORY DATA:      EKG:  Normal sinus rhythm, RBBB.       Advanced lipid panel: April 2019:  Total cholesterol 153 LDL 72 HDL 77 triglycerides 30 APO B 72 LP(a) 64  Inflammation panel: Normal  Endothelial function panel: Normal  Diabetes panel: Normal  Metabolic panel: Normal.  Vitamin D 66 homocystine 11  Omega-3 index 10.6  Sterol panel: Normal  Renal function panel: GFR 74      IMPRESSIONS/RECOMMENDATIONS:  1. Coronary artery disease.  The patient has had prior cardiac catheterization which demonstrated nonobstructive LAD coronary artery disease.  The patient needs an aggressive risk factor modification program.  He will continue baby aspirin.  2. Dyslipidemia.  Continue Lipitor.  He has reached goal.  3. 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  The patient is at increased risk for progressive atherosclerosis.  4. Prothrombin mutation.  The patient is at increased risk of  DVT.  5. Hyperhomocystinemia.  The patient should continue with B-complex and L5 methyl folate.  6. Obstructive sleep apnea.  The patient will continue nasal CPAP.  7. Borderline hypertension.  Patient will monitor his blood pressures at home.  Our goal is a blood pressure less than 130/80.  Today he was started on losartan 50 mg daily.  8. Anxiety disorder.  9. GERD and irritable bowel syndrome.  Stable    Summary: Dandy is demonstrating cardiovascular stability.  Today we initiated losartan 50 mg daily for mild hypertension.  We reviewed his prevention program in detail.  We are making no substantial changes today.    This was a 30 minute patient encounter with greater than 50% time spent in care coordination and counseling.    Sincerely,    Nnamdi Gustafson MD  4/29/2019

## 2019-04-29 ENCOUNTER — OFFICE VISIT (OUTPATIENT)
Dept: CARDIOLOGY | Facility: CLINIC | Age: 64
End: 2019-04-29
Payer: COMMERCIAL

## 2019-04-29 VITALS — HEIGHT: 71 IN | WEIGHT: 162 LBS | BODY MASS INDEX: 22.68 KG/M2

## 2019-04-29 DIAGNOSIS — I10 ESSENTIAL HYPERTENSION: ICD-10-CM

## 2019-04-29 DIAGNOSIS — E78.5 DYSLIPIDEMIA: ICD-10-CM

## 2019-04-29 DIAGNOSIS — I25.10 CORONARY ARTERY DISEASE INVOLVING NATIVE CORONARY ARTERY OF NATIVE HEART WITHOUT ANGINA PECTORIS: Primary | ICD-10-CM

## 2019-04-29 DIAGNOSIS — G47.33 OSA (OBSTRUCTIVE SLEEP APNEA): ICD-10-CM

## 2019-04-29 DIAGNOSIS — R79.89 LOW VITAMIN D LEVEL: ICD-10-CM

## 2019-04-29 PROCEDURE — 93000 ELECTROCARDIOGRAM COMPLETE: CPT | Performed by: INTERNAL MEDICINE

## 2019-04-29 PROCEDURE — 99214 OFFICE O/P EST MOD 30 MIN: CPT | Performed by: INTERNAL MEDICINE

## 2019-04-29 RX ORDER — LOSARTAN POTASSIUM 50 MG/1
50 TABLET ORAL DAILY
Qty: 90 TABLET | Refills: 3 | Status: SHIPPED | OUTPATIENT
Start: 2019-04-29 | End: 2020-04-21 | Stop reason: SDUPTHER

## 2019-04-29 NOTE — LETTER
2019     Jaylon Irvin MD  [address] [facility] [address]  [facility] [place] [address]    Patient: Hilda Lomax   YOB: 1955   Date of Visit: 2019       Dear Dr. Irvin:    Thank you for referring Hilda Lomax to me for evaluation. Below are my notes for this consultation.    If you have questions, please do not hesitate to call me. I look forward to following your patient along with you.         Sincerely,        Nnamdi Gustafson MD        CC: No Recipients  Nnamdi Gustafson MD  2019  2:29 PM  Sign at close encounter                                       [facility]    2019  Jaylon Irvin MD  [address]  [place] [address]    Re:  HILDA LOMAX  :  1955    Dear Jaylon:    It was my pleasure to see your patient, Hilda Lomax, in the [facility] today.  As you know, he was referred for follow-up therapy concerning his subclinical coronary artery disease and dyslipidemia.    The patient has been through a previous cardiac workup by Dr. Santo Tucker at [facility].  A coronary calcium score was obtained which revealed significant plaquing of his LAD.  This prompted a stress study which was equivocal for ischemia.  This was followed by a diagnostic left heart catheterization in 2016 which demonstrated moderate nonobstructive LAD disease with was FFR negative.  Medical management was indicated.    The patient has been on a prevention program over the past several years.  He is on Lipitor 10 mg daily.  His most recent lipid panel revealed a Total cholesterol 153 LDL 72 HDL 77 triglycerides 30 APO B 72. His LP(a) is normal.  This is an excellent response to the use of Lipitor 10 mg daily.    His bioinflammatory markers including HSCRP and LPPLA-2 are normal.  His endothelial function is normal.  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  He has a  prothrombin mutation which increases his risk for venous thrombosis.  His homocystine level is elevated at 11.  There is no evidence of insulin resistance.  His ferritin levels are low.  His omega-3 index is 10.9.    We reviewed his medications and supplements in detail and are making no substantial changes today.  He will continue on with prevention program as outlined above.    PAST MEDICAL HISTORY:    1. Coronary artery disease, cardiac catheterization in November 2016, moderate stenosis involving the mid LAD, FFR negative  2. dyslipidemia, well controlled with Lipitor   3. gastroesophageal reflux disease  4. osteoarthritis, osteoporosis  5.  basal cell carcinoma of the skin.  6.  Right bundle branch block  7.  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    CURRENT MEDICATIONS:     Current Outpatient Prescriptions:   •  alpha lipoic acid 200 mg capsule, Take 200 mg by mouth once daily., Disp: , Rfl:   •  aspirin (ASPIR-81) 81 mg enteric coated tablet, Take 81 mg by mouth once daily., Disp: , Rfl:   •  atorvastatin (LIPITOR) 10 mg tablet, Take 1 tablet (10 mg total) by mouth daily., Disp: 90 tablet, Rfl: 3  •  calcium carbonate (CALCIUM 500) 500 mg calcium (1,250 mg) tablet, Take by mouth once daily., Disp: , Rfl:   •  cholecalciferol, vitamin D3, (VITAMIN D3) 2,000 unit tablet, Take by mouth daily., Disp: , Rfl:   •  coenzyme Q10 (CO Q-10) 200 mg capsule, Take 200 mg by mouth once daily., Disp: , Rfl:   •  escitalopram (LEXAPRO) 10 mg tablet, Take 10 mg by mouth once daily., Disp: , Rfl: 1  •  Lactobac no.41-Bifidobact no.7 (PROBIOTIC-10) 70 mg (3 billion cell) capsule, Take by mouth daily., Disp: , Rfl:   •  levomefolate calcium (L-METHYLFOLATE) 15 mg tablet, Take by mouth once daily., Disp: , Rfl:   •  MULTIVIT WITH MINERALS/LUTEIN (MULTIVITAMIN 50 PLUS ORAL), Take by mouth once daily., Disp: , Rfl:   •  omega-3 fatty acids 1,000 mg capsule, Take by mouth once  daily. Two capsules daily, Disp: , Rfl:   •  pantoprazole (PROTONIX) 40 mg EC tablet, Take 40 mg by mouth daily. Two capsule every three days , Disp: , Rfl:   •  resveratrol 250 mg capsule, Take 250 mg by mouth daily.  , Disp: , Rfl:   •  zolpidem (AMBIEN) 10 mg tablet, Take 10 mg by mouth nightly as needed. for sleep, Disp: , Rfl: 0  •  losartan (COZAAR) 50 mg tablet, Take 1 tablet (50 mg total) by mouth daily., Disp: 90 tablet, Rfl: 3    SUPPLEMENTS:  Multivitamin, omega-3 fish oil, vitamin-D, Reservitol, methyl-B essentials, probiotic, coenzyme-Q10, lipoic acid, calcium.  We reviewed his supplements in detail today.    ALLERGIES:  No known drug allergies.    FAMILY HISTORY:  Mother with hypertension, congestive heart failure.  Father with myocardial infarction, hypertension.    SOCIAL HISTORY:  He is .  His wife's name is Lakeshia Presley.  He has one child.  He is an , self-employed.  He does not smoke.  Rare alcohol.  Regular exercise.    REVIEW OF SYSTEMS:  The patient has snoring, uses nasal CPAP, so he does have sleep apnea, GERD, reflux.  He has been on proton pump inhibitors for 15 years.  The remainder of his 12 point review of systems is negative.    PHYSICAL EXAMINATION:  The patient is a middle-aged male in no acute distress.    Weight:  162  BMI:  22.9.  Blood pressure: 150/90  HEENT:  Unremarkable.  Neck:  Supple, no JVD.  Lungs:  Clear to auscultation and percussion.  Cardiac:  Regular rate and rhythm.  Abdomen:  Soft, bowel sounds present, no organomegaly.  Extremities:  No edema, pulses intact.  Skin:  Warm and dry.  Neuro:  Alert and oriented X 3.    LABORATORY DATA:      EKG:  Normal sinus rhythm, RBBB.       Advanced lipid panel: April 2019:  Total cholesterol 153 LDL 72 HDL 77 triglycerides 30 APO B 72 LP(a) 64  Inflammation panel: Normal  Endothelial function panel: Normal  Diabetes panel: Normal  Metabolic panel: Normal.  Vitamin D 66 homocystine 11  Omega-3  index 10.6  Sterol panel: Normal  Renal function panel: GFR 74      IMPRESSIONS/RECOMMENDATIONS:  1. Coronary artery disease.  The patient has had prior cardiac catheterization which demonstrated nonobstructive LAD coronary artery disease.  The patient needs an aggressive risk factor modification program.  He will continue baby aspirin.  2. Dyslipidemia.  Continue Lipitor.  He has reached goal.  3. 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  The patient is at increased risk for progressive atherosclerosis.  4. Prothrombin mutation.  The patient is at increased risk of DVT.  5. Hyperhomocystinemia.  The patient should continue with B-complex and L5 methyl folate.  6. Obstructive sleep apnea.  The patient will continue nasal CPAP.  7. Borderline hypertension.  Patient will monitor his blood pressures at home.  Our goal is a blood pressure less than 130/80.  Today he was started on losartan 50 mg daily.  8. Anxiety disorder.  9. GERD and irritable bowel syndrome.  Stable    Summary: Dandy is demonstrating cardiovascular stability.  Today we initiated losartan 50 mg daily for mild hypertension.  We reviewed his prevention program in detail.  We are making no substantial changes today.    This was a 30 minute patient encounter with greater than 50% time spent in care coordination and counseling.    Sincerely,    Nnamdi Gustafson MD  4/29/2019

## 2019-05-13 RX ORDER — ATORVASTATIN CALCIUM 10 MG/1
10 TABLET, FILM COATED ORAL DAILY
Qty: 90 TABLET | Refills: 3 | Status: SHIPPED | OUTPATIENT
Start: 2019-05-13 | End: 2020-04-21 | Stop reason: SDUPTHER

## 2019-08-16 ENCOUNTER — TELEPHONE (OUTPATIENT)
Dept: SCHEDULING | Facility: CLINIC | Age: 64
End: 2019-08-16

## 2019-08-16 NOTE — TELEPHONE ENCOUNTER
Pt called received letter [facility] [employer] is going bankrupt. Please contact pt back about alternatives at [phone]

## 2019-08-16 NOTE — TELEPHONE ENCOUNTER
Please call pt, so far the lab is still in business. By the time of his appt they may now be. So he should call a few weeks prior to is appt and we will let him know what blood work to get.

## 2019-08-19 NOTE — TELEPHONE ENCOUNTER
Left detailed voicemail in regards to [employer] bankruptcy situation. Advised him to call the office a few weeks prior to his appt to see if we heard anything and if he needed alternative blood work.

## 2019-10-01 ENCOUNTER — TELEPHONE (OUTPATIENT)
Dept: PRIMARY CARE | Facility: CLINIC | Age: 64
End: 2019-10-01

## 2019-10-01 ENCOUNTER — HOSPITAL ENCOUNTER (OUTPATIENT)
Dept: RADIOLOGY | Facility: HOSPITAL | Age: 64
Discharge: HOME | End: 2019-10-01
Attending: FAMILY MEDICINE
Payer: MEDICARE

## 2019-10-01 ENCOUNTER — OFFICE VISIT (OUTPATIENT)
Dept: PRIMARY CARE | Facility: CLINIC | Age: 64
End: 2019-10-01
Payer: MEDICARE

## 2019-10-01 ENCOUNTER — TELEPHONE (OUTPATIENT)
Dept: CARDIOLOGY | Facility: CLINIC | Age: 64
End: 2019-10-01

## 2019-10-01 VITALS
HEIGHT: 70 IN | WEIGHT: 162.6 LBS | TEMPERATURE: 98 F | DIASTOLIC BLOOD PRESSURE: 70 MMHG | HEART RATE: 74 BPM | BODY MASS INDEX: 23.28 KG/M2 | SYSTOLIC BLOOD PRESSURE: 130 MMHG | RESPIRATION RATE: 18 BRPM | OXYGEN SATURATION: 97 %

## 2019-10-01 DIAGNOSIS — T14.8XXA BRUISING: ICD-10-CM

## 2019-10-01 DIAGNOSIS — R05.9 COUGH: ICD-10-CM

## 2019-10-01 DIAGNOSIS — I10 ESSENTIAL HYPERTENSION: ICD-10-CM

## 2019-10-01 DIAGNOSIS — R06.00 DYSPNEA, UNSPECIFIED TYPE: ICD-10-CM

## 2019-10-01 DIAGNOSIS — K21.9 GASTROESOPHAGEAL REFLUX DISEASE, ESOPHAGITIS PRESENCE NOT SPECIFIED: ICD-10-CM

## 2019-10-01 DIAGNOSIS — I25.10 CORONARY ARTERY DISEASE INVOLVING NATIVE CORONARY ARTERY OF NATIVE HEART WITHOUT ANGINA PECTORIS: ICD-10-CM

## 2019-10-01 DIAGNOSIS — F41.9 ANXIETY DISORDER, UNSPECIFIED TYPE: ICD-10-CM

## 2019-10-01 DIAGNOSIS — J30.9 ALLERGIC RHINITIS, UNSPECIFIED SEASONALITY, UNSPECIFIED TRIGGER: ICD-10-CM

## 2019-10-01 DIAGNOSIS — R07.89 CHEST TIGHTNESS: ICD-10-CM

## 2019-10-01 DIAGNOSIS — R07.89 CHEST TIGHTNESS: Primary | ICD-10-CM

## 2019-10-01 PROBLEM — R79.89 LOW TESTOSTERONE: Status: ACTIVE | Noted: 2019-10-01

## 2019-10-01 PROBLEM — N41.9 PROSTATITIS: Status: ACTIVE | Noted: 2019-10-01

## 2019-10-01 PROBLEM — K59.00 CONSTIPATION: Status: ACTIVE | Noted: 2019-10-01

## 2019-10-01 PROBLEM — M51.369 DDD (DEGENERATIVE DISC DISEASE), LUMBAR: Status: ACTIVE | Noted: 2019-10-01

## 2019-10-01 PROCEDURE — 93000 ELECTROCARDIOGRAM COMPLETE: CPT | Performed by: FAMILY MEDICINE

## 2019-10-01 PROCEDURE — 99214 OFFICE O/P EST MOD 30 MIN: CPT | Performed by: FAMILY MEDICINE

## 2019-10-01 PROCEDURE — 71046 X-RAY EXAM CHEST 2 VIEWS: CPT

## 2019-10-01 RX ORDER — FLUTICASONE PROPIONATE 50 MCG
1 SPRAY, SUSPENSION (ML) NASAL DAILY
Qty: 1 BOTTLE | Refills: 5 | Status: SHIPPED | OUTPATIENT
Start: 2019-10-01 | End: 2019-11-18

## 2019-10-01 RX ORDER — BENZONATATE 100 MG/1
200 CAPSULE ORAL 3 TIMES DAILY PRN
Qty: 15 CAPSULE | Refills: 0 | Status: SHIPPED | OUTPATIENT
Start: 2019-10-01 | End: 2020-01-10 | Stop reason: ALTCHOICE

## 2019-10-01 RX ORDER — AZELASTINE 1 MG/ML
1 SPRAY, METERED NASAL 2 TIMES DAILY
Qty: 30 ML | Refills: 5 | Status: SHIPPED | OUTPATIENT
Start: 2019-10-01 | End: 2019-11-18

## 2019-10-01 RX ORDER — ESCITALOPRAM OXALATE 10 MG/1
10 TABLET ORAL
Refills: 1 | COMMUNITY
Start: 2019-10-01 | End: 2019-11-18

## 2019-10-01 RX ORDER — FLUTICASONE FUROATE AND VILANTEROL 200; 25 UG/1; UG/1
1 POWDER RESPIRATORY (INHALATION) DAILY
Qty: 1 EACH | Refills: 3 | Status: SHIPPED | OUTPATIENT
Start: 2019-10-01 | End: 2019-10-02 | Stop reason: CLARIF

## 2019-10-01 ASSESSMENT — ENCOUNTER SYMPTOMS
CONSTIPATION: 1
DIARRHEA: 0
ENDOCRINE NEGATIVE: 1
RESPIRATORY NEGATIVE: 1
ALLERGIC/IMMUNOLOGIC NEGATIVE: 1
CHILLS: 0
FEVER: 0
DIZZINESS: 0
CONSTITUTIONAL NEGATIVE: 1
VOMITING: 0
MUSCULOSKELETAL NEGATIVE: 1
UNEXPECTED WEIGHT CHANGE: 0
HEMATOLOGIC/LYMPHATIC NEGATIVE: 1
EYES NEGATIVE: 1
NAUSEA: 0
SHORTNESS OF BREATH: 0
BLOOD IN STOOL: 0
APPETITE CHANGE: 0
PSYCHIATRIC NEGATIVE: 1
NEUROLOGICAL NEGATIVE: 1
CARDIOVASCULAR NEGATIVE: 1

## 2019-10-01 NOTE — TELEPHONE ENCOUNTER
Let patient know that his chest x-ray looked okay-- some element of COPD since previous chest x-ray, we may be on to something with the inhaler, thanks

## 2019-10-01 NOTE — PROGRESS NOTES
Subjective      Patient ID: Dandy Lomax is a 64 y.o. male.    Upper chest tightness/cough/irritation approx 1 week, neg temp/illness; feels run down, some dyspnea reading to 10yo son; thought gerd related as well; not sure if sx related to urine enz product--carpet, property mgmt, industrial hygienist by trade; sees [facility], cardio [place], integrative dr [facility]--osteop from ppi, physiatrist l5-s1, uro prostatitis, bx; no recent testing, lab w annual; integrative did testosterone, low, able to climax, decr libido; takes asa--sl more bruising--ok by cardio; some anxiety--has tapered himself off ssri; has had asbestos exposure, was followed q 5 yrs--?not recently; has seen ent; had ca scoring, 'failed stress' had cath 60% block LAD--f/u cardio mid nov; gi mid oct; sputum w cough, mostly clear--has mery/sleep dr              The following have been reviewed and updated as appropriate in this visit:  Problems       Patient Active Problem List   Diagnosis   • Coronary artery disease   • Dyslipidemia   • Prothrombin mutation (CMS/HCC)   • Hyperhomocystinemia (CMS/HCC)   • MERY (obstructive sleep apnea)   • Hypertension   • Anxiety disorder   • GERD (gastroesophageal reflux disease)   • Age-related osteoporosis without current pathological fracture   • Varicose veins of legs   • Trigger ring finger of right hand   • Osteoporosis   • Low vitamin D level   • Knee pain, bilateral   • Insomnia   • Cyclic neutropenia (CMS/HCC)   • Chest tightness   • Cough   • Allergic rhinitis   • Dyspnea   • Bruising   • Prostatitis   • DDD (degenerative disc disease), lumbar   • Low testosterone   • Constipation     History reviewed. No pertinent surgical history.  Family History   Problem Relation Age of Onset   • Hypertension Biological Mother    • Heart failure Biological Mother    • Uterine cancer Biological Mother    • Heart attack Biological Father    • Hypertension Biological Father    • COPD Biological Father    • No Known Problems  Sister      Social History     Social History   • Marital status:      Spouse name: N/A   • Number of children: 1   • Years of education: N/A     Occupational History   •       Social History Main Topics   • Smoking status: Never Smoker   • Smokeless tobacco: Never Used   • Alcohol use Yes      Comment: Rare alcohol   • Drug use: No   • Sexual activity: Not on file     Other Topics Concern   • Not on file     Social History Narrative    One son, 10 y/o.            Review of Systems   Constitutional: Negative.  Negative for appetite change, chills, fever and unexpected weight change.   HENT: Negative.         Scratchy throat   Eyes: Negative.    Respiratory: Negative.  Negative for shortness of breath.    Cardiovascular: Negative.  Negative for chest pain.        Sx w cough; varicosities le's --no procedure currently planned   Gastrointestinal: Positive for constipation. Negative for blood in stool, diarrhea, nausea and vomiting.        Occas constip, mild ibs   Endocrine: Negative.    Genitourinary: Negative.         Nocturia 1-2   Musculoskeletal: Negative.    Skin: Negative.    Allergic/Immunologic: Negative.  Negative for environmental allergies.        Pos mold exposure   Neurological: Negative.  Negative for dizziness and syncope.   Hematological: Negative.    Psychiatric/Behavioral: Negative.        No Known Allergies  Current Outpatient Prescriptions   Medication Sig Dispense Refill   • alpha lipoic acid 200 mg capsule Take 200 mg by mouth once daily.     • aspirin (ASPIR-81) 81 mg enteric coated tablet Take 81 mg by mouth once daily.     • atorvastatin (LIPITOR) 10 mg tablet Take 1 tablet (10 mg total) by mouth daily. 90 tablet 3   • calcium carbonate (CALCIUM 500) 500 mg calcium (1,250 mg) tablet Take by mouth once daily.     • cholecalciferol, vitamin D3, (VITAMIN D3) 2,000 unit tablet Take by mouth daily.     • coenzyme Q10 (CO Q-10) 200 mg capsule Take 200 mg by mouth once  "daily.     • Lactobac no.41-Bifidobact no.7 (PROBIOTIC-10) 70 mg (3 billion cell) capsule Take by mouth daily.     • losartan (COZAAR) 50 mg tablet Take 1 tablet (50 mg total) by mouth daily. 90 tablet 3   • MULTIVIT WITH MINERALS/LUTEIN (MULTIVITAMIN 50 PLUS ORAL) Take by mouth once daily.     • omega-3 fatty acids 1,000 mg capsule Take by mouth once daily. Two capsules daily     • pantoprazole (PROTONIX) 40 mg EC tablet Take 40 mg by mouth daily. Two capsule every three days      • resveratrol 250 mg capsule Take 250 mg by mouth daily.       • zolpidem (AMBIEN) 10 mg tablet Take 10 mg by mouth nightly as needed. for sleep  0   • azelastine (ASTELIN) 137 mcg (0.1 %) nasal spray Administer 1 spray into each nostril 2 (two) times a day. Use in each nostril as directed. 30 mL 5   • benzonatate (TESSALON PERLES) 100 mg capsule Take 2 capsules (200 mg total) by mouth 3 (three) times a day as needed for cough for up to 10 days. 15 capsule 0   • escitalopram (LEXAPRO) 10 mg tablet Take 1 tablet (10 mg total) by mouth once daily. 1/2 tab qd  1   • fluticasone furoate-vilanterol (BREO ELLIPTA) 200-25 mcg/dose blister with device powder for inhalation Inhale 1 puff daily. 1 each 3   • fluticasone propionate (FLONASE) 50 mcg/actuation nasal spray Administer 1 spray into each nostril daily. 1 Bottle 5     No current facility-administered medications for this visit.        Objective   Vitals:    10/01/19 1150   BP: 130/70   BP Location: Left upper arm   Patient Position: Sitting   Pulse: 74   Resp: 18   Temp: 36.7 °C (98 °F)   SpO2: 97%   Weight: 73.8 kg (162 lb 9.6 oz)   Height: 1.778 m (5' 10\")       Physical Exam   Constitutional: He is oriented to person, place, and time. He appears well-developed and well-nourished. No distress.   HENT:   Head: Normocephalic and atraumatic.   Right Ear: External ear normal.   Left Ear: External ear normal.   Nose: Nose normal.   Mouth/Throat: Oropharynx is clear and moist.   Mos stig payam, " pnd    Eyes: Pupils are equal, round, and reactive to light. Conjunctivae and EOM are normal.   Neck: Normal range of motion. Neck supple. No thyromegaly present.   Cardiovascular: Normal rate, regular rhythm and normal heart sounds.    No murmur heard.  Pulmonary/Chest: Effort normal and breath sounds normal.   Sl coarse/tight bs   Abdominal: Soft. Bowel sounds are normal.   Lymphadenopathy:     He has no cervical adenopathy.   Neurological: He is alert and oriented to person, place, and time.   Skin: Skin is warm and dry.   Psychiatric: He has a normal mood and affect. His behavior is normal. Judgment and thought content normal.   ?sl anxious baseline   Vitals reviewed.      Assessment/Plan   Problem List Items Addressed This Visit     Coronary artery disease     ER/move up cardio appt, red flags         Hypertension    Anxiety disorder    Relevant Medications    escitalopram (LEXAPRO) 10 mg tablet    GERD (gastroesophageal reflux disease)     May try ppi bid temporarily         Chest tightness - Primary     ekg-doubt acute process; rsr' poss similar to previous, rbbb on file         Relevant Orders    ECG 12 LEAD OFFICE PERFORMED (Completed)    X-RAY CHEST 2 VIEWS (Completed)    Ambulatory referral to Pulmonology    Cough     Defers fluv--will obtain in community as he is able, cough rx hs prn; pulm eval, cxr         Allergic rhinitis     NI         Dyspnea     MDI         Bruising     Will move up physical/lab to reji Lomax DO    10/1/2019

## 2019-10-01 NOTE — TELEPHONE ENCOUNTER
Shay López,     Pt. Stopped into the office today bc he just came from his pcp's office. He had an ekg done in their office and it showed something. His pcp wanted to alert Dr. Gustafson to the change. He is scheduled to come into the office in November but didn't know if his apt should be moved up based on the changes.    Thanks.    Estephanie

## 2019-10-02 ENCOUNTER — TELEPHONE (OUTPATIENT)
Dept: PRIMARY CARE | Facility: CLINIC | Age: 64
End: 2019-10-02

## 2019-10-02 RX ORDER — BUDESONIDE AND FORMOTEROL FUMARATE DIHYDRATE 160; 4.5 UG/1; UG/1
2 AEROSOL RESPIRATORY (INHALATION) 2 TIMES DAILY
Qty: 1 INHALER | Refills: 5 | Status: SHIPPED | OUTPATIENT
Start: 2019-10-02 | End: 2019-11-18

## 2019-10-02 NOTE — TELEPHONE ENCOUNTER
Let patient know that I changed his inhaler to Symbicort–– there is no telling whether this will be covered by his insurance–– if it is not, could he please speak with his pharmacy/insurance etc.?––happy to make another change if something is preferred, thanks    pls see my note re chg to symbicort, not sure if it was linked, thx

## 2019-10-02 NOTE — PROGRESS NOTES
Let patient know that I changed his inhaler to Symbicort-- there is no telling whether this will be covered by his insurance-- if it is not, could he please speak with his pharmacy/insurance etc.?--happy to make another change if something is preferred, thanks

## 2019-10-03 NOTE — TELEPHONE ENCOUNTER
S/W pt he stated that he talked to pharmacist and was told that the steroid inhalers will be expensive due to his medicare not covering.  I suggested he look into [facility].

## 2019-10-22 ENCOUNTER — OFFICE VISIT (OUTPATIENT)
Dept: ORTHOPEDICS | Facility: CLINIC | Age: 64
End: 2019-10-22
Payer: MEDICARE

## 2019-10-22 VITALS — BODY MASS INDEX: 22.68 KG/M2 | HEIGHT: 71 IN | WEIGHT: 162 LBS

## 2019-10-22 DIAGNOSIS — M48.061 LUMBAR STENOSIS WITHOUT NEUROGENIC CLAUDICATION: Primary | ICD-10-CM

## 2019-10-22 DIAGNOSIS — M54.16 LUMBAR RADICULOPATHY: ICD-10-CM

## 2019-10-22 DIAGNOSIS — M47.816 LUMBAR FACET ARTHROPATHY: ICD-10-CM

## 2019-10-22 PROCEDURE — 99205 OFFICE O/P NEW HI 60 MIN: CPT | Performed by: ORTHOPAEDIC SURGERY

## 2019-10-22 NOTE — PROGRESS NOTES
ORTHOSPINE H&P  Admitting Diagnosis:  No admission diagnoses are documented for this encounter.      CHIEF COMPLAINT : 1.  Right leg pain  2.  Right leg numbness  3.  Declining walk tolerance    HPI :     Patient is a 64 y.o. male who presents with 10-month history of progressive pain in the right leg, intermittent right leg numbness and tingling declining walk tolerance.    Has had one epidural which provided 2 months of relief 2 months of physical therapy without relief.    Pain is reported to be sharp burning and aching in nature.  Pain diagram does support a dermatomal distribution.    100% of the pain is in the leg pain is worse with standing and walking.  Notes improvement with sitting.    2 Block Walk tolerance is described.    Symptoms have been gradual but progressive over 10 months..     Medical History:   Past Medical History:   Diagnosis Date   • Anxiety    • Coronary artery disease    • Dyslipidemia 4/20/2018   • GERD (gastroesophageal reflux disease)    • Hyperhomocysteinemia (CMS/HCC)    • Hypertension    • Lipid disorder    • Obstructive sleep apnea        Surgical History: History reviewed. No pertinent surgical history.    Social History:   Social History     Social History Narrative    One son, 12 y/o.        Family History:   Family History   Problem Relation Age of Onset   • Hypertension Biological Mother    • Heart failure Biological Mother    • Uterine cancer Biological Mother    • Heart attack Biological Father    • Hypertension Biological Father    • COPD Biological Father    • No Known Problems Biological Sister        Allergies: Patient has no known allergies.       Medication List           Accurate as of October 22, 2019 11:25 AM. If you have any questions, ask your nurse or doctor.               CONTINUE taking these medications    alpha lipoic acid 200 mg capsule  Take 200 mg by mouth once daily.  Dose:  200 mg     ASPIR-81 81 mg enteric coated tablet  Take 81 mg by mouth once  daily.  Dose:  81 mg  Generic drug:  aspirin     atorvastatin 10 mg tablet  Commonly known as:  LIPITOR  Take 1 tablet (10 mg total) by mouth daily.  Dose:  10 mg     azelastine 137 mcg (0.1 %) nasal spray  Commonly known as:  ASTELIN  Administer 1 spray into each nostril 2 (two) times a day. Use in each nostril as directed.  Dose:  1 spray     budesonide-formoterol 160-4.5 mcg/actuation inhaler  Commonly known as:  SYMBICORT  Inhale 2 puffs 2 (two) times a day. Rinse mouth with water after use to reduce aftertaste and incidence of candidiasis. Do not swallow.  Dose:  2 puff     CALCIUM 500 500 mg calcium (1,250 mg) tablet  Take by mouth once daily.  Generic drug:  calcium carbonate     CO Q-10 200 mg capsule  Take 200 mg by mouth once daily.  Dose:  200 mg  Generic drug:  coenzyme Q10     escitalopram 10 mg tablet  Commonly known as:  LEXAPRO  Take 1 tablet (10 mg total) by mouth once daily. 1/2 tab qd  Dose:  10 mg     fluticasone propionate 50 mcg/actuation nasal spray  Commonly known as:  FLONASE  Administer 1 spray into each nostril daily.  Dose:  1 spray     losartan 50 mg tablet  Commonly known as:  COZAAR  Take 1 tablet (50 mg total) by mouth daily.  Dose:  50 mg     MULTIVITAMIN 50 PLUS ORAL  Take by mouth once daily.     omega-3 fatty acids 1,000 mg capsule  Take by mouth once daily. Two capsules daily     pantoprazole 40 mg EC tablet  Commonly known as:  PROTONIX  Take 40 mg by mouth daily. Two capsule every three days  Dose:  40 mg     PROBIOTIC-10 70 mg (3 billion cell) capsule  Take by mouth daily.  Generic drug:  Lactobac no.41-Bifidobact no.7     resveratrol 250 mg capsule  Take 250 mg by mouth daily.  Dose:  250 mg     VITAMIN D3 2,000 unit tablet  Take by mouth daily.  Generic drug:  cholecalciferol (vitamin D3)     zolpidem 10 mg tablet  Commonly known as:  AMBIEN  Take 10 mg by mouth nightly as needed. for sleep  Dose:  10 mg          Review of Systems  All other systems reviewed and negative  "except as noted in the HPI.    Objective       Physical Exam :   Visit Vitals  Ht 1.803 m (5' 11\")   Wt 73.5 kg (162 lb)   BMI 22.59 kg/m²       The patient appears healthy, there stated age, and in no acute distress.  The patient is oriented to person, time, and place.  Gait is nonantalgic, and no assistive device is being utilized for ambulation.  Lumbar spine inspection reveals no tenderness, pain, or muscle spasm.  Range of motion of the lumbar spine is restricted in all planes.  Gross motor testing of the lower extremities from L2-S1 is 5/5 bilaterally with no demonstrable weakness to manual resistance.  Reflexes are 2+ at the patella and Achilles bilaterally.  Pulses are +2 at the dorsalis pedis and posterior tibial region bilaterally.  There is no evidence of any nerve root tension signs in the seated position at 30 and 90° of leg elevation actively and passively bilaterally.  Hip range of motion is full and painless with internal and external rotation.  Knee range of motion is full and painless to extension and flexion.  No distal muscular atrophy was appreciated.      Imaging : Personal view MRI scan of the lumbar spine outlines moderate to severe foraminal stenosis at the L5-S1 level moderate at L4-L5.  Central canal is mildly stenotic.  Most of the stenosis in the neuroforamen L4-5 L5-S1.    MRI report reviewed      IMPRESSION : 1.  L4-5 L5-S1 lumbar stenosis with right lower extremity radiculopathy    PLAN : The risk of posterior lumbar spine surgery was reviewed, including the topics of neurologic, infectious, medical, as well as the clinical outcomes both favorable and unfavorable as a result of a posteriorly-based lumbar spine surgery.    Specifically, the neurologic risks including nerve injury, dural tear, spinal fluid leakage, pseudomeningocele, arachnoiditis, cauda equina syndrome, hematoma with neurologic consequences, traction neuritis, reflex sympathetic dystrophy, additional lower extremity " neurologic causalgias, as well as persistent neurologic symptomatology and/or new symptomatology were all reviewed.  In addition, the unpredictable neurologic imponderables were also referenced.    Infectious complications including superficial and deep wound infection, spinal meningitis, osteomyelitis, discitis, epidural abscess, and the need definitively for further surgery and/or long-term intravenous antibiotic treatment or wound management or revision were outlined.    Medical complications including cardiopulmonary, cardiovascular, cerebrovascular, gastrointestinal, as well as thromboembolic phenomenon were reviewed.  The possibility of a deep vein thrombosis leading to a pulmonary embolism was outlined as well as the cardiac risks, cerebrovascular risks, pulmonary risks, and other medical unpredictable outcomes related to the use of general anesthesia, stress of surgery, and underlying either diagnosed or undiagnosed medical comorbidities.    The concept of adjacent segment degeneration if applicable as well as the concept of ongoing spinal degeneration requiring further surgery even at the same operative level were outlined.  In general, the possibility of outcomes included either short or long-term failures related to prior or current surgical treatments.    Specifically, the outcomes of surgery were reviewed, including the favorable, unfavorable, and unpredictable outcomes related to neurologic surgical procedures as well as specifically posteriorly-based lumbar spine surgery.  These outcomes included relief of leg pain, relief of low back pain, as well as the possibility of no change or worsening in this patient's preoperative complaints.  In addition, the possibility of a patient developing a clinical syndrome that was not present preoperatively and manifested itself in the postoperative period was reviewed.  The percentage probability of improving presurgical leg pain, low back pain, as well as the  additional neuritic complaints of numbness, weakness, and paresthesias were reviewed.    Throughout the course of this presurgical discussion it was emphasized over multiple conversations that this procedure generally improves predictably radicular or leg pain complaints and generally has an unpredictable outcome result regarding axial pain complaints.    I discussed the clinical treatment options in great detail both surgical and nonsurgical.  We did discuss the role of spinal surgery, particularly revision surgery and the risks associated with this procedure specifically.  Risk of surgery including nerve root injury, dural tear, spinal fluid leakage, pseudomeningocele, and post laminectomy instability were reviewed.  Instrumentation complications including implant failure, fracture, migration, loosening, dislodgment, need for revision, and removal of implants were all reviewed.    The patient was scheduled to have appropriate preadmission testing performed, which would include an H NP, blood work, EKG, and a cardiac evaluation prior to surgery.  In addition, the need for vascular surgeon evaluation depending on prior history of blood clots or pulmonary embolism were reviewed.    This patient is going to require an extended postoperative length of stay greater than 2 midnight stays.  In fact, this patient will require up to a 4 day hospital stay as a result of the need for closed suction drain, need for 48 hours of intravenous antibiotics recommended by the infectious disease specialist as well as the concerns over a possible perioperative neurologic deficit by way of either a hematoma and/or a compressive wound condition.  In addition, this patient is manifesting a number of medical comorbidities including the need for medication monitoring and pain management monitoring as a result of the extensive nature of their spinal surgical procedure.  It is my recommendation that this patient will likely require a greater  than 24 hour hospital stay due to the risks to this patient of a perioperative or postoperative complication that would ultimately result in grave circumstances in the outpatient setting and increase this patient to a higher risk of postoperative morbidity and/or mortality.    The role of a posteriorly-based L4-S1 decompressive laminectomy was outlined.        Perico Cervantes MD  10/22/2019  11:25 AM

## 2019-10-24 ENCOUNTER — TELEPHONE (OUTPATIENT)
Dept: SCHEDULING | Facility: CLINIC | Age: 64
End: 2019-10-24

## 2019-10-24 NOTE — TELEPHONE ENCOUNTER
Sj pt - pt calling to speak with Maribel about new [facility] adv lipid panel form and testing he received in the mail      Pt can be reached at [phone]

## 2019-11-09 LAB
APO B SERPL-MCNC: 63 MG/DL
CHOLEST SERPL-MCNC: 162 MG/DL
CHOLEST/HDLC SERPL: 2.2 CALC
HDLC SERPL-MCNC: 75 MG/DL
HLD.LARGE SERPL-SCNC: 7082 NMOL/L (ref 3382–9376)
LDL SERPL QN: 223 ANGSTROM
LDL SERPL-SCNC: 936 NMOL/L (ref 732–2035)
LDL SMALL SERPL-SCNC: 149 NMOL/L (ref 85–473)
LDLC REAL SIZE PAT SERPL: NORMAL PATTERN
LDLC SERPL CALC-MCNC: 76 MG/DL (CALC)
LPA SERPL-SCNC: 50 NMOL/L
NONHDLC SERPL-MCNC: 87 MG/DL (CALC)
QST LDL MEDIUM: 170 NMOL/L (ref 122–498)
TRIGL SERPL-MCNC: 37 MG/DL

## 2019-11-15 NOTE — PROGRESS NOTES
[facility]    2019  Jaylon Irvin MD  [address]  [address] [facility] [place] [address]    Re:  HILDA LOMAX  :  1955    Dear Jaylon:    It was my pleasure to see your patient, Hilda Lomax, in the [facility] today.  As you know, he was referred for follow-up therapy concerning his subclinical coronary artery disease and dyslipidemia.    The patient has been through a previous cardiac workup by Dr. Santo Tucker at [place].  A coronary calcium score was obtained which revealed significant plaquing of his LAD.  This prompted a stress study which was equivocal for ischemia.  This was followed by a diagnostic left heart catheterization in 2016 which demonstrated moderate nonobstructive LAD disease with was FFR negative.  Medical management was indicated.    The patient has been on a prevention program over the past several years.  He is on Lipitor 10 mg daily.  His most recent lipid panel revealed a Total cholesterol 162, LDL 76,  HDL 75,  triglycerides 37, APO B 63. His LP(a) is normal.  This is an excellent response to the use of Lipitor 10 mg daily.    His bioinflammatory markers including HSCRP and LPPLA-2 are normal.  His endothelial function is normal.  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  He has a prothrombin mutation which increases his risk for venous thrombosis.  His homocystine level is elevated at 11.  There is no evidence of insulin resistance.  His ferritin levels are low.  His omega-3 index is 10.9.    We reviewed his medications and supplements in detail and are making no substantial changes today.  He will continue on with prevention program as outlined above.    PAST MEDICAL HISTORY:    1. Coronary artery disease, cardiac catheterization in 2016, moderate stenosis involving the mid LAD, FFR negative  2. dyslipidemia, well  controlled with Lipitor   3. gastroesophageal reflux disease  4. osteoarthritis, osteoporosis  5.  basal cell carcinoma of the skin.  6.  Right bundle branch block  7.  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    CURRENT MEDICATIONS:     Current Outpatient Medications:   •  alpha lipoic acid 200 mg capsule, Take 200 mg by mouth once daily., Disp: , Rfl:   •  aspirin (ASPIR-81) 81 mg enteric coated tablet, Take 81 mg by mouth once daily., Disp: , Rfl:   •  atorvastatin (LIPITOR) 10 mg tablet, Take 1 tablet (10 mg total) by mouth daily., Disp: 90 tablet, Rfl: 3  •  calcium carbonate (CALCIUM 500) 500 mg calcium (1,250 mg) tablet, Take by mouth once daily., Disp: , Rfl:   •  cholecalciferol, vitamin D3, (VITAMIN D3) 2,000 unit tablet, Take by mouth daily., Disp: , Rfl:   •  coenzyme Q10 (CO Q-10) 200 mg capsule, Take 200 mg by mouth once daily., Disp: , Rfl:   •  Lactobac no.41-Bifidobact no.7 (PROBIOTIC-10) 70 mg (3 billion cell) capsule, Take by mouth daily., Disp: , Rfl:   •  losartan (COZAAR) 50 mg tablet, Take 1 tablet (50 mg total) by mouth daily., Disp: 90 tablet, Rfl: 3  •  magnesium 250 mg tablet, Take 250 mg by mouth daily., Disp: , Rfl:   •  MULTIVIT WITH MINERALS/LUTEIN (MULTIVITAMIN 50 PLUS ORAL), Take by mouth once daily., Disp: , Rfl:   •  omega-3 fatty acids 1,000 mg capsule, Take by mouth once daily. Two capsules daily, Disp: , Rfl:   •  pantoprazole (PROTONIX) 40 mg EC tablet, Take 40 mg by mouth daily.  , Disp: , Rfl:   •  resveratrol 250 mg capsule, Take 250 mg by mouth daily.  , Disp: , Rfl:     SUPPLEMENTS:  Multivitamin, omega-3 fish oil, vitamin-D, Reservitol, methyl-B essentials, probiotic, coenzyme-Q10, lipoic acid, calcium.  We reviewed his supplements in detail today.    ALLERGIES:  No known drug allergies.    FAMILY HISTORY:  Mother with hypertension, congestive heart failure.  Father with myocardial infarction, hypertension.    SOCIAL  HISTORY:  He is .  His wife's name is Lakeshia Presley.  He has one child.  He is an , self-employed.  He does not smoke.  Rare alcohol.  Regular exercise.    REVIEW OF SYSTEMS:  The patient has snoring, uses nasal CPAP, so he does have sleep apnea, GERD, reflux.  He has been on proton pump inhibitors for 15 years.  The remainder of his 12 point review of systems is negative.    PHYSICAL EXAMINATION:  The patient is a middle-aged male in no acute distress.  Weight:  163  BMI:  22.9.  Blood pressure: 136/68  HEENT:  Unremarkable.  Neck:  Supple, no JVD.  Lungs:  Clear to auscultation and percussion.  Cardiac:  Regular rate and rhythm.  Abdomen:  Soft, bowel sounds present, no organomegaly.  Extremities:  No edema, pulses intact.  Skin:  Warm and dry.  Neuro:  Alert and oriented X 3.    LABORATORY DATA:      EKG:  Normal sinus rhythm, RBBB.     Cardiac catheterization 2016:  Mild to moderate non-obstructive LAD disease. FFR LAD negative.  Minor luminal irregularities LCX and RCA.  Normal LV function EF 60%.    Advanced lipid panel: April 2019:  Total cholesterol 153 LDL 72 HDL 77 trig, 30 APO B 72 LP(a) 64  Inflammation panel: Normal  Endothelial function panel: Normal  Diabetes panel: Normal  Metabolic panel: Normal.  Vitamin D 66 homocystine 11  Omega-3 index 10.6  Sterol panel: Normal  Renal function panel: GFR 74    Lipid panel November 2019: Total cholesterol 162 HDL 75 LDL 76 triglycerides 37 APO B 63.    IMPRESSIONS/RECOMMENDATIONS:  1. Coronary artery disease.  The patient has had prior cardiac catheterization which demonstrated nonobstructive LAD coronary artery disease.  The patient needs an aggressive risk factor modification program.  He will continue baby aspirin.  2. Dyslipidemia.  Continue Lipitor.  He has reached goal.  3. 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  The patient is at increased risk for progressive atherosclerosis.  4. Prothrombin mutation.  The patient is at increased risk of  DVT.  5. Hyperhomocystinemia.  The patient should continue with B-complex and L5 methyl folate.  6. Obstructive sleep apnea.  The patient will continue nasal CPAP.  7. Borderline hypertension.  Patient will monitor his blood pressures at home.  Our goal is a blood pressure less than 130/80.  He was started on losartan 50 mg daily.  8. Anxiety disorder.  9. GERD and irritable bowel syndrome.  Stable    Summary: Dandy is demonstrating cardiovascular stability. .  We reviewed his prevention program in detail.  We are making no substantial changes today.  He will be scheduled for stress echo.    This was a 30 minute patient encounter with greater than 50% time spent in care coordination and counseling.    Sincerely,    Nnamdi Gustafson MD  11/18/2019

## 2019-11-18 ENCOUNTER — OFFICE VISIT (OUTPATIENT)
Dept: CARDIOLOGY | Facility: CLINIC | Age: 64
End: 2019-11-18
Payer: MEDICARE

## 2019-11-18 VITALS
DIASTOLIC BLOOD PRESSURE: 68 MMHG | WEIGHT: 163 LBS | HEART RATE: 68 BPM | SYSTOLIC BLOOD PRESSURE: 136 MMHG | BODY MASS INDEX: 22.82 KG/M2 | HEIGHT: 71 IN

## 2019-11-18 DIAGNOSIS — E78.5 DYSLIPIDEMIA: ICD-10-CM

## 2019-11-18 DIAGNOSIS — E72.11 HYPERHOMOCYSTEINEMIA (CMS/HCC): ICD-10-CM

## 2019-11-18 DIAGNOSIS — I25.10 CORONARY ARTERY DISEASE INVOLVING NATIVE CORONARY ARTERY OF NATIVE HEART WITHOUT ANGINA PECTORIS: Primary | ICD-10-CM

## 2019-11-18 PROCEDURE — 99214 OFFICE O/P EST MOD 30 MIN: CPT | Performed by: INTERNAL MEDICINE

## 2019-11-18 NOTE — LETTER
2019     Jaylon Irvin MD  [address] [facility]  [address]  [facility] [address]    Patient: Hilda Lomax  YOB: 1955  Date of Visit: 2019      Dear Dr. Irvin:    Thank you for referring Hilda Lomax to me for evaluation. Below are my notes for this consultation.    If you have questions, please do not hesitate to call me. I look forward to following your patient along with you.         Sincerely,        Nnadmi Gustafson MD        CC: No Recipients  Nnamdi Gustafson MD  2019  4:02 PM  Sign at close encounter                                       [facility]    2019  Jaylon Irvin MD  [address]  [place] [address]    Re:  HILDA LOMAX  :  1955    Dear Jaylon:    It was my pleasure to see your patient, Hilda Lomax, in the [facility] today.  As you know, he was referred for follow-up therapy concerning his subclinical coronary artery disease and dyslipidemia.    The patient has been through a previous cardiac workup by Dr. Santo Tucker at [place].  A coronary calcium score was obtained which revealed significant plaquing of his LAD.  This prompted a stress study which was equivocal for ischemia.  This was followed by a diagnostic left heart catheterization in 2016 which demonstrated moderate nonobstructive LAD disease with was FFR negative.  Medical management was indicated.    The patient has been on a prevention program over the past several years.  He is on Lipitor 10 mg daily.  His most recent lipid panel revealed a Total cholesterol 162, LDL 76,  HDL 75,  triglycerides 37, APO B 63. His LP(a) is normal.  This is an excellent response to the use of Lipitor 10 mg daily.    His bioinflammatory markers including HSCRP and LPPLA-2 are normal.  His endothelial function is normal.  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  He has  a prothrombin mutation which increases his risk for venous thrombosis.  His homocystine level is elevated at 11.  There is no evidence of insulin resistance.  His ferritin levels are low.  His omega-3 index is 10.9.    We reviewed his medications and supplements in detail and are making no substantial changes today.  He will continue on with prevention program as outlined above.    PAST MEDICAL HISTORY:    1. Coronary artery disease, cardiac catheterization in November 2016, moderate stenosis involving the mid LAD, FFR negative  2. dyslipidemia, well controlled with Lipitor   3. gastroesophageal reflux disease  4. osteoarthritis, osteoporosis  5.  basal cell carcinoma of the skin.  6.  Right bundle branch block  7.  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    CURRENT MEDICATIONS:     Current Outpatient Medications:   •  alpha lipoic acid 200 mg capsule, Take 200 mg by mouth once daily., Disp: , Rfl:   •  aspirin (ASPIR-81) 81 mg enteric coated tablet, Take 81 mg by mouth once daily., Disp: , Rfl:   •  atorvastatin (LIPITOR) 10 mg tablet, Take 1 tablet (10 mg total) by mouth daily., Disp: 90 tablet, Rfl: 3  •  calcium carbonate (CALCIUM 500) 500 mg calcium (1,250 mg) tablet, Take by mouth once daily., Disp: , Rfl:   •  cholecalciferol, vitamin D3, (VITAMIN D3) 2,000 unit tablet, Take by mouth daily., Disp: , Rfl:   •  coenzyme Q10 (CO Q-10) 200 mg capsule, Take 200 mg by mouth once daily., Disp: , Rfl:   •  Lactobac no.41-Bifidobact no.7 (PROBIOTIC-10) 70 mg (3 billion cell) capsule, Take by mouth daily., Disp: , Rfl:   •  losartan (COZAAR) 50 mg tablet, Take 1 tablet (50 mg total) by mouth daily., Disp: 90 tablet, Rfl: 3  •  magnesium 250 mg tablet, Take 250 mg by mouth daily., Disp: , Rfl:   •  MULTIVIT WITH MINERALS/LUTEIN (MULTIVITAMIN 50 PLUS ORAL), Take by mouth once daily., Disp: , Rfl:   •  omega-3 fatty acids 1,000 mg capsule, Take by mouth once daily. Two  capsules daily, Disp: , Rfl:   •  pantoprazole (PROTONIX) 40 mg EC tablet, Take 40 mg by mouth daily.  , Disp: , Rfl:   •  resveratrol 250 mg capsule, Take 250 mg by mouth daily.  , Disp: , Rfl:     SUPPLEMENTS:  Multivitamin, omega-3 fish oil, vitamin-D, Reservitol, methyl-B essentials, probiotic, coenzyme-Q10, lipoic acid, calcium.  We reviewed his supplements in detail today.    ALLERGIES:  No known drug allergies.    FAMILY HISTORY:  Mother with hypertension, congestive heart failure.  Father with myocardial infarction, hypertension.    SOCIAL HISTORY:  He is .  His wife's name is Lakeshia Presley.  He has one child.  He is an , self-employed.  He does not smoke.  Rare alcohol.  Regular exercise.    REVIEW OF SYSTEMS:  The patient has snoring, uses nasal CPAP, so he does have sleep apnea, GERD, reflux.  He has been on proton pump inhibitors for 15 years.  The remainder of his 12 point review of systems is negative.    PHYSICAL EXAMINATION:  The patient is a middle-aged male in no acute distress.  Weight:  163  BMI:  22.9.  Blood pressure: 136/68  HEENT:  Unremarkable.  Neck:  Supple, no JVD.  Lungs:  Clear to auscultation and percussion.  Cardiac:  Regular rate and rhythm.  Abdomen:  Soft, bowel sounds present, no organomegaly.  Extremities:  No edema, pulses intact.  Skin:  Warm and dry.  Neuro:  Alert and oriented X 3.    LABORATORY DATA:      EKG:  Normal sinus rhythm, RBBB.     Cardiac catheterization 2016:  Mild to moderate non-obstructive LAD disease. FFR LAD negative.  Minor luminal irregularities LCX and RCA.  Normal LV function EF 60%.    Advanced lipid panel: April 2019:  Total cholesterol 153 LDL 72 HDL 77 trig, 30 APO B 72 LP(a) 64  Inflammation panel: Normal  Endothelial function panel: Normal  Diabetes panel: Normal  Metabolic panel: Normal.  Vitamin D 66 homocystine 11  Omega-3 index 10.6  Sterol panel: Normal  Renal function panel: GFR 74    Lipid panel November  2019: Total cholesterol 162 HDL 75 LDL 76 triglycerides 37 APO B 63.    IMPRESSIONS/RECOMMENDATIONS:  1. Coronary artery disease.  The patient has had prior cardiac catheterization which demonstrated nonobstructive LAD coronary artery disease.  The patient needs an aggressive risk factor modification program.  He will continue baby aspirin.  2. Dyslipidemia.  Continue Lipitor.  He has reached goal.  3. 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  The patient is at increased risk for progressive atherosclerosis.  4. Prothrombin mutation.  The patient is at increased risk of DVT.  5. Hyperhomocystinemia.  The patient should continue with B-complex and L5 methyl folate.  6. Obstructive sleep apnea.  The patient will continue nasal CPAP.  7. Borderline hypertension.  Patient will monitor his blood pressures at home.  Our goal is a blood pressure less than 130/80.  He was started on losartan 50 mg daily.  8. Anxiety disorder.  9. GERD and irritable bowel syndrome.  Stable    Summary: Dandy is demonstrating cardiovascular stability. .  We reviewed his prevention program in detail.  We are making no substantial changes today.  He will be scheduled for stress echo.    This was a 30 minute patient encounter with greater than 50% time spent in care coordination and counseling.    Sincerely,    Nnamdi Gustafson MD  11/18/2019

## 2019-11-19 ENCOUNTER — TELEPHONE (OUTPATIENT)
Dept: CARDIOLOGY | Facility: HOSPITAL | Age: 64
End: 2019-11-19

## 2019-11-26 ENCOUNTER — TELEPHONE (OUTPATIENT)
Dept: PRIMARY CARE | Facility: CLINIC | Age: 64
End: 2019-11-26

## 2019-11-27 ENCOUNTER — HOSPITAL ENCOUNTER (OUTPATIENT)
Dept: CARDIOLOGY | Facility: HOSPITAL | Age: 64
Discharge: HOME | End: 2019-11-27
Attending: INTERNAL MEDICINE
Payer: MEDICARE

## 2019-11-27 VITALS
HEIGHT: 71 IN | HEART RATE: 85 BPM | WEIGHT: 163 LBS | DIASTOLIC BLOOD PRESSURE: 76 MMHG | BODY MASS INDEX: 22.82 KG/M2 | SYSTOLIC BLOOD PRESSURE: 138 MMHG

## 2019-11-27 DIAGNOSIS — I25.10 CORONARY ARTERY DISEASE INVOLVING NATIVE CORONARY ARTERY OF NATIVE HEART WITHOUT ANGINA PECTORIS: ICD-10-CM

## 2019-11-27 PROCEDURE — 93350 STRESS TTE ONLY: CPT | Mod: 26 | Performed by: INTERNAL MEDICINE

## 2019-11-27 PROCEDURE — 93018 CV STRESS TEST I&R ONLY: CPT | Performed by: INTERNAL MEDICINE

## 2019-11-27 PROCEDURE — 93351 STRESS TTE COMPLETE: CPT

## 2019-11-27 PROCEDURE — 93016 CV STRESS TEST SUPVJ ONLY: CPT | Performed by: INTERNAL MEDICINE

## 2019-11-27 NOTE — TELEPHONE ENCOUNTER
Can you please let patient know that I prefer to place the order at the time of exam to appropriately address concerns of the moment, avoid re-dos, dovetail with other providers, etc.?, thanks

## 2019-12-02 LAB
BSA FOR ECHO PROCEDURE: 1.92 M2
EDV (BP): 93 CM3
EF (A4C): 52 %
EF A2C: 63 %
EJECTION FRACTION: 58 %
ESV (BP): 39 CM3
LA ESV (BP): 114 CM3
LA ESV INDEX (A2C): 55.21 CM3/M2
LA ESV INDEX (BP): 59.38 CM3/M2
LAAS-AP2: 28.2 CM2
LAAS-AP4: 29.4 CM2
LALD A4C: 5.91 CM
LALD A4C: 6.33 CM
LAV-S: 106 CM3
LEFT ATRIUM VOLUME INDEX: 60.94 CM3/M2
LEFT ATRIUM VOLUME: 117 CM3
LEFT VENTRICLE DIASTOLIC VOLUME INDEX: 43.75 CM3/M2
LEFT VENTRICLE DIASTOLIC VOLUME: 84 CM3
LEFT VENTRICLE SYSTOLIC VOLUME INDEX: 20.83 CM3/M2
LEFT VENTRICLE SYSTOLIC VOLUME: 40 CM3
LV DIASTOLIC VOLUME: 101 CM3
LV ESV (APICAL 2 CHAMBER): 37 CM3
LVAD-AP2: 30.9 CM2
LVAD-AP4: 28.1 CM2
LVAS-AP2: 16.6 CM2
LVAS-AP4: 17.4 CM2
LVEDVI(A2C): 52.6 CM3/M2
LVEDVI(BP): 48.44 CM3/M2
LVESVI(A2C): 19.27 CM3/M2
LVESVI(BP): 20.31 CM3/M2
LVLD-AP2: 8 CM
LVLD-AP4: 7.73 CM
LVLS-AP2: 6.53 CM
LVLS-AP4: 6.42 CM
STRESS ANGINA INDEX: 0
STRESS BASELINE BP: NORMAL MMHG
STRESS BASELINE HR: 74 BPM
STRESS DUKE TREADMILL SCORE: 8
STRESS O2 SAT REST: 96 %
STRESS PERCENT HR: 106 %
STRESS POST ESTIMATED WORKLOAD: 17.2 METS
STRESS POST EXERCISE DUR MIN: 13 MIN
STRESS POST EXERCISE DUR SEC: 21 SEC
STRESS POST PEAK BP: NORMAL MMHG
STRESS POST PEAK HR: 164 BPM
STRESS ST DEPRESSION: 1 MM
STRESS TARGET HR: 132 BPM
TR MAX PG: 37 MMHG
TRICUSPID VALVE PEAK REGURGITATION VELOCITY: 3.03 M/S
TV REST PULMONARY ARTERY PRESSURE: 37 MMHG

## 2020-01-10 ENCOUNTER — TELEPHONE (OUTPATIENT)
Dept: PRIMARY CARE | Facility: CLINIC | Age: 65
End: 2020-01-10

## 2020-01-10 ENCOUNTER — OFFICE VISIT (OUTPATIENT)
Dept: PRIMARY CARE | Facility: CLINIC | Age: 65
End: 2020-01-10
Payer: MEDICARE

## 2020-01-10 VITALS
RESPIRATION RATE: 12 BRPM | OXYGEN SATURATION: 97 % | WEIGHT: 161.2 LBS | HEART RATE: 65 BPM | HEIGHT: 71 IN | DIASTOLIC BLOOD PRESSURE: 70 MMHG | SYSTOLIC BLOOD PRESSURE: 126 MMHG | BODY MASS INDEX: 22.57 KG/M2

## 2020-01-10 DIAGNOSIS — F41.9 ANXIETY DISORDER, UNSPECIFIED TYPE: ICD-10-CM

## 2020-01-10 DIAGNOSIS — Z00.00 MEDICARE ANNUAL WELLNESS VISIT, INITIAL: Primary | ICD-10-CM

## 2020-01-10 DIAGNOSIS — J45.909 ASTHMA, UNSPECIFIED ASTHMA SEVERITY, UNSPECIFIED WHETHER COMPLICATED, UNSPECIFIED WHETHER PERSISTENT: ICD-10-CM

## 2020-01-10 DIAGNOSIS — K21.9 GASTROESOPHAGEAL REFLUX DISEASE, ESOPHAGITIS PRESENCE NOT SPECIFIED: ICD-10-CM

## 2020-01-10 DIAGNOSIS — I10 ESSENTIAL HYPERTENSION: ICD-10-CM

## 2020-01-10 DIAGNOSIS — G47.33 OSA (OBSTRUCTIVE SLEEP APNEA): ICD-10-CM

## 2020-01-10 DIAGNOSIS — L30.9 DERMATITIS: ICD-10-CM

## 2020-01-10 DIAGNOSIS — Z87.438 HISTORY OF PROSTATITIS: ICD-10-CM

## 2020-01-10 DIAGNOSIS — G47.00 INSOMNIA, UNSPECIFIED TYPE: ICD-10-CM

## 2020-01-10 DIAGNOSIS — R79.89 LOW TESTOSTERONE: ICD-10-CM

## 2020-01-10 DIAGNOSIS — D70.4 CYCLIC NEUTROPENIA (CMS/HCC): ICD-10-CM

## 2020-01-10 DIAGNOSIS — E55.9 VITAMIN D DEFICIENCY: ICD-10-CM

## 2020-01-10 DIAGNOSIS — M51.369 DDD (DEGENERATIVE DISC DISEASE), LUMBAR: ICD-10-CM

## 2020-01-10 DIAGNOSIS — I25.10 CORONARY ARTERY DISEASE INVOLVING NATIVE CORONARY ARTERY OF NATIVE HEART WITHOUT ANGINA PECTORIS: ICD-10-CM

## 2020-01-10 DIAGNOSIS — I83.93 VARICOSE VEINS OF BOTH LOWER EXTREMITIES, UNSPECIFIED WHETHER COMPLICATED: ICD-10-CM

## 2020-01-10 DIAGNOSIS — M81.0 OSTEOPOROSIS, UNSPECIFIED OSTEOPOROSIS TYPE, UNSPECIFIED PATHOLOGICAL FRACTURE PRESENCE: ICD-10-CM

## 2020-01-10 PROCEDURE — G0402 INITIAL PREVENTIVE EXAM: HCPCS | Performed by: FAMILY MEDICINE

## 2020-01-10 RX ORDER — ZOLPIDEM TARTRATE 5 MG/1
TABLET ORAL
COMMUNITY
End: 2020-02-20 | Stop reason: SDUPTHER

## 2020-01-10 RX ORDER — DIAZEPAM 2 MG/1
2 TABLET ORAL AS NEEDED
COMMUNITY
Start: 2019-12-04 | End: 2021-01-28 | Stop reason: ALTCHOICE

## 2020-01-10 ASSESSMENT — ENCOUNTER SYMPTOMS
ALLERGIC/IMMUNOLOGIC NEGATIVE: 1
BLOOD IN STOOL: 0
ARTHRALGIAS: 0
RESPIRATORY NEGATIVE: 1
UNEXPECTED WEIGHT CHANGE: 0
NEUROLOGICAL NEGATIVE: 1
SHORTNESS OF BREATH: 0
ABDOMINAL PAIN: 0
PSYCHIATRIC NEGATIVE: 1
DIZZINESS: 0
EYES NEGATIVE: 1
CONSTITUTIONAL NEGATIVE: 1
MYALGIAS: 0
GASTROINTESTINAL NEGATIVE: 1
CARDIOVASCULAR NEGATIVE: 1
FEVER: 0
ENDOCRINE NEGATIVE: 1
MUSCULOSKELETAL NEGATIVE: 1
HEMATOLOGIC/LYMPHATIC NEGATIVE: 1
CHILLS: 0
APPETITE CHANGE: 0

## 2020-01-10 ASSESSMENT — MINI COG
COMPLETED: YES
TOTAL SCORE: 4

## 2020-01-10 NOTE — PATIENT INSTRUCTIONS
Your Personalized Prevention Plan Services (PPPS)    Preventive Services Checklist (Assumes Average Risk Unless Otherwise Noted):    Health Maintenance Topics with due status: Overdue       Topic Date Due    HIV Screening 07/16/1967    Medicare Annual Wellness Visit 07/16/1972    Zoster Vaccine 07/16/2004    DTaP, Tdap, and Td Vaccines 09/02/2011    Pneumococcal (65 years and older) 07/16/2019     Health Maintenance Topics with due status: Not Due       Topic Last Completion Date    Colonoscopy 01/31/2017    Annual Falls Risk Screening 01/10/2020     Health Maintenance Topics with due status: Completed       Topic Last Completion Date    Hepatitis C Screening 12/12/2016    Influenza Vaccine 10/08/2019     Health Maintenance Topics with due status: Aged Out       Topic Date Due    Meningococcal ACWY Aged Out    Varicella Vaccines Aged Out    HIB Vaccines Aged Out    IPV Vaccines Aged Out    HPV Vaccines Aged Out    Pneumococcal Aged Out       You May Be Eligible for These Additional Preventive Services   (Assumes Average Risk Unless Otherwise Noted)  Diabetes Screening Any 1 risk factor: hypertension, dyslipidemia, obesity, high glucose; or Any 2 risk factors: >=66yo, overweight, family history diabetes (covered every 6 months)   Hepatitis C Screening Any 1 risk factor: 1) blood transfusion before 1992,   2) current or past injection drug use (annually for high risk; if born between 0[birth year]15, see above for status).   Vaccine: Hepatitis B As necessary if at-risk: hemophilia, ESRD, diabetes, living with individual infected with hep B, healthcare worker with frequent contact with blood/bodily fluids (series covered once)   Sexually Transmitted Diseases (STDs) As necessary chlamydia, gonorrhea, syphilis, hepatitis B (covered annually)  HIV if any 1 risk factor present: 1) <16yo or >66yo and at increased risk or 2) 15-66yo and ask for it (covered annually)   Lung Cancer Screening Low dose chest CT  if all 3 risk factors: 1) 55-78yo, 2) smoker or quit within last 15y, 3) >=30 pack years (covered annually).  No results found for this or any previous visit.     Cholesterol Screening No signs or symptoms of cardiovascular disease (screening covered every 5 years).     Prostate Cancer Screening >= 51yo if patient desires test after weighting potential harms and benefits (covered annually)         Health Risk Factors with Personalized Education:  ----------------------------------------------------------------------------------------------------------------------  Stress management:  Understanding Your Stress  · Think about how you know when you’re stressed.  · Think about how your thoughts or behaviors are different when you’re stressed.  · Think about what triggers stress for you.  · Think about how you handle stress, and whether you’re making unhealthy choices in response to stress (like smoking, drinking alcohol or overeating).  Managing Stress  · Take a break from the stressful situation.  · Reduce your stress levels by exercising, talking with family/friends, ensuring adequate sleep.  · Consider meditation or yoga.  · Make sure you plan time to do things you enjoy.  · Let your PCP know if you’re having problems limiting your stress.  ----------------------------------------------------------------------------------------------------------------------  Controlling Your Blood Pressure  · Maintain a normal weight (body mass index between 18.5 and 24.9).  · Eat more fruit, vegetables and low-fat dairy.  · Eat less saturated fat and total fat.  · Lower your sodium (salt) intake.  Try to stay under 1500 mg per day, but if you cannot get your intake to be that low, at least lower it by 1000 mg.  · Stay active.  Try to get at least 90 to 150 minutes of exercise per week.  Try brisk walking, swimming, bicycling or dancing.  · Limit alcohol intake.  When you do consume alcohol, drink no more than 2 drinks per day.  · If  you have been prescribed medication, take it regularly and exactly as prescribed.  Let your PCP know if you have any problems or questions about your medication.  · Check your blood pressure at home or at the store.  Write down your readings and share them with your PCP  ---------------------------------------------------------------------------------------------------------------------   Controlling Your Osteoporosis, Strengthening Your Bones  · Try to get at least 90 to 150 minutes of weight-bearing exercise per week.  · Ensure intake of at least 1200mg of calcium per day.  Eat foods high in calcium like milk and other dairy, green vegetables, fruit, canned fish with soft and edible bones, nuts, calcium-set tofu.  Some foods are calcium-fortified, like bread, cereal, fruit juices and mineral water.  · Help your body make vitamin D by getting 10-15 minutes per day of sunlight.    · Ensure intake of at least 600IU of vitamin D per day.  Eat foods high in vitamin D like oily fish (salmon, sardines, mackerel) and eggs.  Some foods are fortified with vitamin D, like dairy and cereals.  · Avoid high amounts of caffeine and salt, since they can cause the body to loose calcium.  · Limit alcohol intake, since it is associated with weaker bones and is associated with falls and fractures.  · Limit intake of fizzy drinks.  · If you have been prescribed medication, take it regularly and exactly as prescribed.  Let your PCP know if you have any problems or questions about your medication  ----------------------------------------------------------------------------------------------------------------------  Reducing Your Risk of Falls  · Tell your PCP if any of your medications make you feel tired, dizzy, lightheaded or off-balance.  · Maintain coordination, flexibility and balance by ensuring regular physical activity.  · Limit alcohol intake to 1 drink per day.  Consider avoiding all alcohol intake.  · Ensure good vision.   Visit an ophthalmologist or optometrist regularly for vision screening or to make sure your glasses / contact lens prescription is correct.  If you need glasses or contacts, wear them.  When you get new glasses or contacts, take time to get used to them.  Do not wear sunglasses or tinted lenses when indoors.  · Ensure good hearing.  Have your hearing checked if you are having trouble hearing, or family and friends think you cannot hear them.  If you need a hearing aid, be sure it fits well and wear it.  · Get enough rest.  Ensure about 7-9 hours of sleep every day.  · Get up slowly from your bed or chairs.  Do not start walking until you are sure you feel steady.  · Wear non-skid, rubber-soled, low-heeled shoes.  Do not walk in socks, or in shoes and slippers with smooth soles.  · If your PCP or therapist recommends using a cane or walker, use it regularly.  · Make your home safer.  Increase lighting throughout the house, especially at the top and bottom of stairs.  Ensure lighting is easily turned on when getting up in the middle of the night.  Make sure there are two secure rails on all stairs.  Install grab bars in the bathtub / shower and near the toilet.  Consider using a shower chair and / or a hand-held shower.  · Spread sand or salt on icy surfaces.  Beware of wet surfaces, which can be icy.  · Tell your PCP if you have fallen.

## 2020-01-10 NOTE — PROGRESS NOTES
Subjective      Patient ID: Dandy Lomax is a 64 y.o. male.    For  Wellness--interval updates--saw pul--doesn't believe copd, poss minor asthma, pfts/poss gerd related as well; sees gi luz (back to qd ppi, 2yr fu egd), cardio lank q 6mo--losartan, had echo, integrative dr at [facility]--osteoporosis monitoring/gen health ?low T, physiatrist l5-s1--dr santillan neurosurg [facility]ie, uro prostatitis, bx--annual; back on lexapro 5 re sleep/ anxiety sees psych; no current ent fu salivary stones; mery/sleep dr, no new complts; believes had pneumo and fluv at pul this yr; reviewed shingles tdap; had dxa last April, new study this mo; has been hiv neg--just , 10yo son w her;                The following have been reviewed and updated as appropriate in this visit:       Patient Active Problem List   Diagnosis   • Coronary artery disease   • Dyslipidemia   • Prothrombin mutation (CMS/HCC)   • Hyperhomocystinemia (CMS/HCC)   • MERY (obstructive sleep apnea)   • Hypertension   • Anxiety disorder   • GERD (gastroesophageal reflux disease)   • Varicose veins of legs   • Trigger ring finger of right hand   • Osteoporosis   • Knee pain, bilateral   • Insomnia   • Cyclic neutropenia (CMS/HCC)   • Chest tightness   • Cough   • Allergic rhinitis   • Dyspnea   • Bruising   • DDD (degenerative disc disease), lumbar   • Low testosterone   • Constipation   • Lumbar stenosis without neurogenic claudication   • Lumbar radiculopathy   • Lumbar facet arthropathy   • Medicare annual wellness visit, initial   • Vitamin D deficiency   • Asthma   • History of prostatitis   • Dermatitis     Past Surgical History:   Procedure Laterality Date   • CATARACT EXTRACTION     • COLONOSCOPY     • ESOPHAGOGASTRODUODENOSCOPY       Family History   Problem Relation Age of Onset   • Hypertension Biological Mother    • Heart failure Biological Mother    • Uterine cancer Biological Mother    • Heart attack Biological Father    • Hypertension Biological Father    •  COPD Biological Father    • No Known Problems Biological Sister      Social History     Socioeconomic History   • Marital status:      Spouse name: Not on file   • Number of children: 1   • Years of education: Not on file   • Highest education level: Not on file   Occupational History   • Occupation:    Social Needs   • Financial resource strain: Not on file   • Food insecurity:     Worry: Not on file     Inability: Not on file   • Transportation needs:     Medical: Not on file     Non-medical: Not on file   Tobacco Use   • Smoking status: Never Smoker   • Smokeless tobacco: Never Used   Substance and Sexual Activity   • Alcohol use: Yes     Comment: Rare alcohol   • Drug use: No   • Sexual activity: Not on file   Lifestyle   • Physical activity:     Days per week: Not on file     Minutes per session: Not on file   • Stress: Not on file   Relationships   • Social connections:     Talks on phone: Not on file     Gets together: Not on file     Attends [employer] service: Not on file     Active member of club or organization: Not on file     Attends meetings of clubs or organizations: Not on file     Relationship status: Not on file   • Intimate partner violence:     Fear of current or ex partner: Not on file     Emotionally abused: Not on file     Physically abused: Not on file     Forced sexual activity: Not on file   Other Topics Concern   • Not on file   Social History Narrative    One son, 10 y/o.        Review of Systems   Constitutional: Negative.  Negative for appetite change, chills, fever and unexpected weight change.   HENT: Negative.    Eyes: Negative.         Optometry annually, hx cataract surg   Respiratory: Negative.  Negative for shortness of breath.    Cardiovascular: Negative.  Negative for chest pain.   Gastrointestinal: Negative.  Negative for abdominal pain and blood in stool.        1/'17 last colo--5 yr   Endocrine: Negative.    Genitourinary: Negative.     Musculoskeletal: Negative.  Negative for arthralgias and myalgias.   Skin: Negative.  Negative for rash.        Dry skin, gets rash r tib annually, responds to otc hc, ?eczema, varicose vv rle--declined tx vv clinic, also seen goldie   Allergic/Immunologic: Negative.    Neurological: Negative.  Negative for dizziness and syncope.   Hematological: Negative.    Psychiatric/Behavioral: Negative.         Stable       No Known Allergies  Current Outpatient Medications   Medication Sig Dispense Refill   • alpha lipoic acid 200 mg capsule Take 200 mg by mouth once daily.     • aspirin (ASPIR-81) 81 mg enteric coated tablet Take 81 mg by mouth once daily.     • atorvastatin (LIPITOR) 10 mg tablet Take 1 tablet (10 mg total) by mouth daily. 90 tablet 3   • calcium carbonate (CALCIUM 500) 500 mg calcium (1,250 mg) tablet Take by mouth once daily.     • cholecalciferol, vitamin D3, (VITAMIN D3) 2,000 unit tablet Take by mouth daily.     • coenzyme Q10 (CO Q-10) 200 mg capsule Take 200 mg by mouth once daily.     • diazePAM (VALIUM) 2 mg tablet      • Lactobac no.41-Bifidobact no.7 (PROBIOTIC-10) 70 mg (3 billion cell) capsule Take by mouth daily.     • losartan (COZAAR) 50 mg tablet Take 1 tablet (50 mg total) by mouth daily. 90 tablet 3   • magnesium 250 mg tablet Take 250 mg by mouth daily.     • MULTIVIT WITH MINERALS/LUTEIN (MULTIVITAMIN 50 PLUS ORAL) Take by mouth once daily.     • omega-3 fatty acids 1,000 mg capsule Take by mouth once daily. Two capsules daily     • pantoprazole (PROTONIX) 40 mg EC tablet Take 40 mg by mouth daily.       • resveratrol 250 mg capsule Take 250 mg by mouth daily.       • zolpidem (AMBIEN) 5 mg tablet daily.     • varicella-zoster gE-AS01B, PF, (SHINGRIX, PF,) 50 mcg/0.5 mL suspension for reconstitution injection Inject 0.5 mL (50 mcg total) into the shoulder, thigh, or buttocks once for 1 dose. 0.5 mL 1     No current facility-administered medications for this visit.        Objective  "  Vitals:    01/10/20 1045   BP: 126/70   Pulse: 65   Resp: 12   SpO2: 97%   Weight: 73.1 kg (161 lb 3.2 oz)   Height: 1.791 m (5' 10.5\")       Physical Exam   Constitutional: He is oriented to person, place, and time. He appears well-developed and well-nourished.   HENT:   Head: Normocephalic and atraumatic.   Right Ear: External ear normal.   Left Ear: External ear normal.   Nose: Nose normal.   Mouth/Throat: Oropharynx is clear and moist.   Mild stig payam   Eyes: Pupils are equal, round, and reactive to light. Conjunctivae and EOM are normal.   Neck: Normal range of motion. Neck supple. No thyromegaly present.   Cardiovascular: Normal rate, regular rhythm and normal heart sounds.   No murmur heard.  Pulmonary/Chest: Effort normal and breath sounds normal.   Coarse bs   Abdominal: Soft. Bowel sounds are normal.   Lymphadenopathy:     He has no cervical adenopathy.   Neurological: He is alert and oriented to person, place, and time.   Skin: Skin is warm and dry.   rle see above   Psychiatric: He has a normal mood and affect. His behavior is normal. Judgment and thought content normal.   Vitals reviewed.      Assessment/Plan   Problem List Items Addressed This Visit        Respiratory    MERY (obstructive sleep apnea)     Cont per sleep spec         Asthma     Cont per pulm            Circulatory    Coronary artery disease     Cont per cardio         Hypertension     stable         Varicose veins of legs       Digestive    GERD (gastroesophageal reflux disease)     Cont per gi         Vitamin D deficiency     replenish         Relevant Orders    Vitamin D 25 hydroxy       Musculoskeletal    Osteoporosis     Cont per integrative, will check T--certainly pt will need to convey to uro         DDD (degenerative disc disease), lumbar     Cont per neurosurg            Hematologic    Cyclic neutropenia (CMS/HCC)     Poss heme eval            Infectious/Inflammatory    Dermatitis     Derm eval prn            Other    Anxiety " disorder     Cont per psych         Relevant Medications    zolpidem (AMBIEN) 5 mg tablet    diazePAM (VALIUM) 2 mg tablet    Insomnia    Low testosterone    Relevant Orders    Testosterone    Medicare annual wellness visit, initial - Primary     Reviewed prev health in detail, vax, lab         Relevant Orders    CBC and Differential    Comprehensive metabolic panel    Urinalysis with Reflex Culture (ED and Outpatient only)    Lipid panel    TSH w reflex FT4    PSA    History of prostatitis     Cont per uro               Samieunice Lomax, DO    1/10/2020  Subjective     Dandy Lomax is a 64 y.o. male who presents for an initial annual wellness visit.     Comprehensive Medical and Social History  Patient Active Problem List   Diagnosis   • Coronary artery disease   • Dyslipidemia   • Prothrombin mutation (CMS/HCC)   • Hyperhomocystinemia (CMS/HCC)   • MERY (obstructive sleep apnea)   • Hypertension   • Anxiety disorder   • GERD (gastroesophageal reflux disease)   • Varicose veins of legs   • Trigger ring finger of right hand   • Osteoporosis   • Knee pain, bilateral   • Insomnia   • Cyclic neutropenia (CMS/HCC)   • Chest tightness   • Cough   • Allergic rhinitis   • Dyspnea   • Bruising   • DDD (degenerative disc disease), lumbar   • Low testosterone   • Constipation   • Lumbar stenosis without neurogenic claudication   • Lumbar radiculopathy   • Lumbar facet arthropathy   • Medicare annual wellness visit, initial   • Vitamin D deficiency   • Asthma   • History of prostatitis   • Dermatitis     Past Medical History:   Diagnosis Date   • Anxiety    • Coronary artery disease    • Dyslipidemia 4/20/2018   • GERD (gastroesophageal reflux disease)    • Hyperhomocysteinemia (CMS/HCC)    • Hypertension    • Lipid disorder    • Obstructive sleep apnea      Past Surgical History:   Procedure Laterality Date   • CATARACT EXTRACTION     • COLONOSCOPY     • ESOPHAGOGASTRODUODENOSCOPY       No Known Allergies  Current Outpatient  "Medications   Medication Sig Dispense Refill   • alpha lipoic acid 200 mg capsule Take 200 mg by mouth once daily.     • aspirin (ASPIR-81) 81 mg enteric coated tablet Take 81 mg by mouth once daily.     • atorvastatin (LIPITOR) 10 mg tablet Take 1 tablet (10 mg total) by mouth daily. 90 tablet 3   • calcium carbonate (CALCIUM 500) 500 mg calcium (1,250 mg) tablet Take by mouth once daily.     • cholecalciferol, vitamin D3, (VITAMIN D3) 2,000 unit tablet Take by mouth daily.     • coenzyme Q10 (CO Q-10) 200 mg capsule Take 200 mg by mouth once daily.     • diazePAM (VALIUM) 2 mg tablet      • Lactobac no.41-Bifidobact no.7 (PROBIOTIC-10) 70 mg (3 billion cell) capsule Take by mouth daily.     • losartan (COZAAR) 50 mg tablet Take 1 tablet (50 mg total) by mouth daily. 90 tablet 3   • magnesium 250 mg tablet Take 250 mg by mouth daily.     • MULTIVIT WITH MINERALS/LUTEIN (MULTIVITAMIN 50 PLUS ORAL) Take by mouth once daily.     • omega-3 fatty acids 1,000 mg capsule Take by mouth once daily. Two capsules daily     • pantoprazole (PROTONIX) 40 mg EC tablet Take 40 mg by mouth daily.       • resveratrol 250 mg capsule Take 250 mg by mouth daily.       • zolpidem (AMBIEN) 5 mg tablet daily.     • varicella-zoster gE-AS01B, PF, (SHINGRIX, PF,) 50 mcg/0.5 mL suspension for reconstitution injection Inject 0.5 mL (50 mcg total) into the shoulder, thigh, or buttocks once for 1 dose. 0.5 mL 1     No current facility-administered medications for this visit.      Social History     Tobacco Use   • Smoking status: Never Smoker   • Smokeless tobacco: Never Used   Substance Use Topics   • Alcohol use: Yes     Comment: Rare alcohol   • Drug use: No       Objective   Vitals  Vitals:    01/10/20 1045   BP: 126/70   Pulse: 65   Resp: 12   SpO2: 97%   Weight: 73.1 kg (161 lb 3.2 oz)   Height: 1.791 m (5' 10.5\")     Body mass index is 22.8 kg/m².    Advanced Care Plan  Does patient have advance directive?: Yes                            "          PHQ  Have You Felt Down, Depressed or Hopeless?: no  Have You Felt Little Interest or Pleasure in Doing Things?: no                                          Mini Cog  Completed: Yes  Score: 4  Result: Negative    Get Up and Go  Result: Pass            STEADI Falls Risk  One or more falls in the last year: No           Has trouble stepping up onto a curb: No   Advised to use a cane or walker to get around safely: No   Often has to rush to the toilet: No   Feels unsteady when walking: No   Has lost some feeling in feet: No   Often feels sad or depressed: No   Steadies self on furniture while walking at home: No   Takes medication that makes him/her feel lightheaded or more tired than usual: No   Worried about falling: No   Takes medicine to sleep or improve mood: No   Needs to push with hands when rising from a chair: No   Falls screen completed: Yes       Hearing and Vision Screening  No exam data present  See HRA for relevant hearing screening response.      Assessment/Plan   Diagnoses and all orders for this visit:    Medicare annual wellness visit, initial (Primary)  Assessment & Plan:  Reviewed [facility] in detail, vax, lab    Orders:  -     CBC and Differential; Future  -     Comprehensive metabolic panel; Future  -     Urinalysis with Reflex Culture (ED and Outpatient only); Future  -     Lipid panel; Future  -     TSH w reflex FT4; Future  -     PSA; Future    Vitamin D deficiency  Assessment & Plan:  replenish    Orders:  -     Vitamin D 25 hydroxy; Future    Low testosterone  -     Testosterone; Future    Asthma, unspecified asthma severity, unspecified whether complicated, unspecified whether persistent  Assessment & Plan:  Cont per pulm      Gastroesophageal reflux disease, esophagitis presence not specified  Assessment & Plan:  Cont per gi      Essential hypertension  Assessment & Plan:  stable      Coronary artery disease involving native coronary artery of native heart without angina  pectoris  Assessment & Plan:  Cont per cardio      Osteoporosis, unspecified osteoporosis type, unspecified pathological fracture presence  Assessment & Plan:  Cont per integrative, will check T--certainly pt will need to convey to uro      DDD (degenerative disc disease), lumbar  Assessment & Plan:  Cont per neurosurg      History of prostatitis  Assessment & Plan:  Cont per uro      Anxiety disorder, unspecified type  Assessment & Plan:  Cont per psych      Varicose veins of both lower extremities, unspecified whether complicated    MERY (obstructive sleep apnea)  Assessment & Plan:  Cont per sleep spec      Cyclic neutropenia (CMS/HCC)  Assessment & Plan:  Poss heme eval      Dermatitis  Assessment & Plan:  Derm eval prn      Insomnia, unspecified type    Other orders  -     varicella-zoster gE-AS01B, PF, (SHINGRIX, PF,) 50 mcg/0.5 mL suspension for reconstitution injection; Inject 0.5 mL (50 mcg total) into the shoulder, thigh, or buttocks once for 1 dose.      See Patient Instructions (the written plan) which was given to the patient for PPPS and health risk factors with interventions.

## 2020-01-10 NOTE — TELEPHONE ENCOUNTER
Hi-- please see if patient has advanced directive, POA, living will etc.-- obtain for chart if applicable, thanks

## 2020-01-27 LAB
25(OH)D3+25(OH)D2 SERPL-MCNC: 41.3 NG/ML (ref 30–100)
ALBUMIN SERPL-MCNC: 4.7 G/DL (ref 3.8–4.8)
ALBUMIN/GLOB SERPL: 2.9 {RATIO} (ref 1.2–2.2)
ALP SERPL-CCNC: 65 IU/L (ref 39–117)
ALT SERPL-CCNC: 23 IU/L (ref 0–44)
AST SERPL-CCNC: 24 IU/L (ref 0–40)
BASOPHILS # BLD AUTO: 0.1 X10E3/UL (ref 0–0.2)
BASOPHILS NFR BLD AUTO: 2 %
BILIRUB SERPL-MCNC: 0.6 MG/DL (ref 0–1.2)
BUN SERPL-MCNC: 19 MG/DL (ref 8–27)
BUN/CREAT SERPL: 18 (ref 10–24)
CALCIUM SERPL-MCNC: 9.2 MG/DL (ref 8.6–10.2)
CHLORIDE SERPL-SCNC: 99 MMOL/L (ref 96–106)
CHOLEST SERPL-MCNC: 159 MG/DL (ref 100–199)
CO2 SERPL-SCNC: 25 MMOL/L (ref 20–29)
CREAT SERPL-MCNC: 1.04 MG/DL (ref 0.76–1.27)
EOSINOPHIL # BLD AUTO: 0.2 X10E3/UL (ref 0–0.4)
EOSINOPHIL NFR BLD AUTO: 5 %
ERYTHROCYTE [DISTWIDTH] IN BLOOD BY AUTOMATED COUNT: 14 % (ref 11.6–15.4)
GLOBULIN SER CALC-MCNC: 1.6 G/DL (ref 1.5–4.5)
GLUCOSE SERPL-MCNC: 100 MG/DL (ref 65–99)
HCT VFR BLD AUTO: 39.9 % (ref 37.5–51)
HDLC SERPL-MCNC: 82 MG/DL
HGB BLD-MCNC: 13.9 G/DL (ref 13–17.7)
IMM GRANULOCYTES # BLD AUTO: 0 X10E3/UL (ref 0–0.1)
IMM GRANULOCYTES NFR BLD AUTO: 0 %
LAB CORP EGFR IF AFRICN AM: 87 ML/MIN/1.73
LAB CORP EGFR IF NONAFRICN AM: 75 ML/MIN/1.73
LDLC SERPL CALC-MCNC: 71 MG/DL (ref 0–99)
LYMPHOCYTES # BLD AUTO: 0.9 X10E3/UL (ref 0.7–3.1)
LYMPHOCYTES NFR BLD AUTO: 22 %
MCH RBC QN AUTO: 30 PG (ref 26.6–33)
MCHC RBC AUTO-ENTMCNC: 34.8 G/DL (ref 31.5–35.7)
MCV RBC AUTO: 86 FL (ref 79–97)
MONOCYTES # BLD AUTO: 0.3 X10E3/UL (ref 0.1–0.9)
MONOCYTES NFR BLD AUTO: 8 %
NEUTROPHILS # BLD AUTO: 2.5 X10E3/UL (ref 1.4–7)
NEUTROPHILS NFR BLD AUTO: 63 %
PLATELET # BLD AUTO: 193 X10E3/UL (ref 150–450)
POTASSIUM SERPL-SCNC: 4.5 MMOL/L (ref 3.5–5.2)
PROT SERPL-MCNC: 6.3 G/DL (ref 6–8.5)
RBC # BLD AUTO: 4.63 X10E6/UL (ref 4.14–5.8)
SODIUM SERPL-SCNC: 141 MMOL/L (ref 134–144)
TRIGL SERPL-MCNC: 30 MG/DL (ref 0–149)
VLDLC SERPL CALC-MCNC: 6 MG/DL (ref 5–40)
WBC # BLD AUTO: 3.9 X10E3/UL (ref 3.4–10.8)

## 2020-01-28 LAB
APPEARANCE UR: CLEAR
BACTERIA #/AREA URNS HPF: NORMAL /[HPF]
BILIRUB UR QL STRIP: NEGATIVE
COLOR UR: YELLOW
EPI CELLS #/AREA URNS HPF: NORMAL /HPF (ref 0–10)
GLUCOSE UR QL: NEGATIVE
HGB UR QL STRIP: NEGATIVE
KETONES UR QL STRIP: NEGATIVE
LAB CORP URINALYSIS REFLEX: NORMAL
LEUKOCYTE ESTERASE UR QL STRIP: NEGATIVE
MICRO URNS: NORMAL
MICRO URNS: NORMAL
NITRITE UR QL STRIP: NEGATIVE
PH UR STRIP: 7 [PH] (ref 5–7.5)
PROT UR QL STRIP: NEGATIVE
PSA SERPL-MCNC: 2 NG/ML (ref 0–4)
RBC #/AREA URNS HPF: NORMAL /HPF (ref 0–2)
SP GR UR: 1.01 (ref 1–1.03)
T4 FREE SERPL-MCNC: 1.12 NG/DL (ref 0.82–1.77)
TESTOST SERPL-MCNC: 396 NG/DL (ref 264–916)
TSH SERPL DL<=0.005 MIU/L-ACNC: 2.93 UIU/ML (ref 0.45–4.5)
UROBILINOGEN UR STRIP-MCNC: 0.2 MG/DL (ref 0.2–1)
WBC #/AREA URNS HPF: NORMAL /HPF (ref 0–5)

## 2020-01-29 ENCOUNTER — TELEPHONE (OUTPATIENT)
Dept: PRIMARY CARE | Facility: CLINIC | Age: 65
End: 2020-01-29

## 2020-01-29 ENCOUNTER — TELEPHONE (OUTPATIENT)
Dept: SCHEDULING | Facility: CLINIC | Age: 65
End: 2020-01-29

## 2020-01-29 NOTE — TELEPHONE ENCOUNTER
Let patient know I reviewed his lab--everything looks great, PSA stable, cholesterol at goal, testosterone normal, thanks

## 2020-01-29 NOTE — TELEPHONE ENCOUNTER
Pt of Dr. Gustafson- Pt calling for results of echo stress from 11/27/19    Also asking about lipid panel vs advanced lipid panel from [facility]      Pt can be reached at [phone]

## 2020-02-07 ENCOUNTER — TELEPHONE (OUTPATIENT)
Dept: SCHEDULING | Facility: CLINIC | Age: 65
End: 2020-02-07

## 2020-02-07 NOTE — TELEPHONE ENCOUNTER
Pt of Dr Gustafson    Pt is scheduled for a lumbar laminectomy on 3/3/20 w/ MD Nnamdi Bai at [facility]    Cardiac clearance is needed prior to Mr Lomax's pre-operative visit which is scheduled on 2/21/20    Alon Bai MD    [address] [place] [address]    [phone] (Fax)    Side Note: Pt also has not received a return call from MD Gustafson in regards to his recent lab work and Stress Echo    Pt can be reached at [phone]

## 2020-02-19 ENCOUNTER — TELEPHONE (OUTPATIENT)
Dept: PRIMARY CARE | Facility: CLINIC | Age: 65
End: 2020-02-19

## 2020-02-19 NOTE — TELEPHONE ENCOUNTER
Escitalopram 10 mg  1 po qd (takes a 1/2 tab)  #30    Zolpidem 10 mg  1 po qd   #15    [facility]  [place]

## 2020-02-20 ENCOUNTER — TELEPHONE (OUTPATIENT)
Dept: PRIMARY CARE | Facility: CLINIC | Age: 65
End: 2020-02-20

## 2020-02-20 RX ORDER — ESCITALOPRAM OXALATE 10 MG/1
10 TABLET ORAL DAILY
Qty: 30 TABLET | Refills: 3 | Status: SHIPPED | OUTPATIENT
Start: 2020-02-20 | End: 2020-11-20 | Stop reason: ALTCHOICE

## 2020-02-20 RX ORDER — ZOLPIDEM TARTRATE 5 MG/1
5 TABLET ORAL DAILY
Qty: 15 TABLET | Refills: 0 | Status: SHIPPED | OUTPATIENT
Start: 2020-02-20 | End: 2020-10-13 | Stop reason: SDUPTHER

## 2020-02-20 NOTE — TELEPHONE ENCOUNTER
Please let patient know that I would be happy to refill the medications.  However please clarify if we will be taking over refilling these medications because I believe he was getting it from a psychiatrist before.  Thank you

## 2020-02-20 NOTE — TELEPHONE ENCOUNTER
, I do not see that you have ordered the lexapro for patient and is not on current med list. Ok to send?

## 2020-02-20 NOTE — TELEPHONE ENCOUNTER
Pt states that his psych doctor is retiring so that is why he is asking the office to refill, he also states that he saw Dr. Lomax the last time he was here and he would like to switch over to him.

## 2020-02-20 NOTE — TELEPHONE ENCOUNTER
Appears that Dr Irvin already sent ambien this time--only need to let me know if this is NOT the case, thx

## 2020-02-20 NOTE — TELEPHONE ENCOUNTER
Medicine Refill Request    Last Office Visit: 1/10/2020  Next Office Visit: Visit date not found        Current Outpatient Medications:   •  alpha lipoic acid 200 mg capsule, Take 200 mg by mouth once daily., Disp: , Rfl:   •  aspirin (ASPIR-81) 81 mg enteric coated tablet, Take 81 mg by mouth once daily., Disp: , Rfl:   •  atorvastatin (LIPITOR) 10 mg tablet, Take 1 tablet (10 mg total) by mouth daily., Disp: 90 tablet, Rfl: 3  •  calcium carbonate (CALCIUM 500) 500 mg calcium (1,250 mg) tablet, Take by mouth once daily., Disp: , Rfl:   •  cholecalciferol, vitamin D3, (VITAMIN D3) 2,000 unit tablet, Take by mouth daily., Disp: , Rfl:   •  coenzyme Q10 (CO Q-10) 200 mg capsule, Take 200 mg by mouth once daily., Disp: , Rfl:   •  diazePAM (VALIUM) 2 mg tablet, , Disp: , Rfl:   •  Lactobac no.41-Bifidobact no.7 (PROBIOTIC-10) 70 mg (3 billion cell) capsule, Take by mouth daily., Disp: , Rfl:   •  losartan (COZAAR) 50 mg tablet, Take 1 tablet (50 mg total) by mouth daily., Disp: 90 tablet, Rfl: 3  •  magnesium 250 mg tablet, Take 250 mg by mouth daily., Disp: , Rfl:   •  MULTIVIT WITH MINERALS/LUTEIN (MULTIVITAMIN 50 PLUS ORAL), Take by mouth once daily., Disp: , Rfl:   •  omega-3 fatty acids 1,000 mg capsule, Take by mouth once daily. Two capsules daily, Disp: , Rfl:   •  pantoprazole (PROTONIX) 40 mg EC tablet, Take 40 mg by mouth daily.  , Disp: , Rfl:   •  resveratrol 250 mg capsule, Take 250 mg by mouth daily.  , Disp: , Rfl:   •  zolpidem (AMBIEN) 5 mg tablet, daily., Disp: , Rfl:       BP Readings from Last 3 Encounters:   01/10/20 126/70   11/27/19 138/76   11/18/19 136/68       Recent Lab results:  Lab Results   Component Value Date    CHOL 159 01/27/2020   ,   Lab Results   Component Value Date    HDL 82 01/27/2020   ,   Lab Results   Component Value Date    LDLCALC 71 01/27/2020   ,   Lab Results   Component Value Date    TRIG 30 01/27/2020        Lab Results   Component Value Date    GLUCOSE 100 (H)  01/27/2020   ,   Lab Results   Component Value Date    HGBA1C 5.4 12/12/2016         Lab Results   Component Value Date    CREATININE 1.04 01/27/2020       Lab Results   Component Value Date    TSH 2.930 01/27/2020

## 2020-02-20 NOTE — TELEPHONE ENCOUNTER
I have queried the state Prescription Drug Monitoring Program [PDMP] and found no suspicious PDMP activity -- appears dr marte approved bunny earlier today

## 2020-03-04 ENCOUNTER — TELEPHONE (OUTPATIENT)
Dept: PRIMARY CARE | Facility: CLINIC | Age: 65
End: 2020-03-04

## 2020-03-05 NOTE — TELEPHONE ENCOUNTER
PC from Dr Bai (c) [phone], s/p spine surg [place]--L-lammy L4-5, L5-S1 went well, improving; has periph neuropathy, and interestingly, foot drop (pt doesn't notice), will have PT, f/u approx 1 mo

## 2020-04-21 RX ORDER — ATORVASTATIN CALCIUM 10 MG/1
10 TABLET, FILM COATED ORAL DAILY
Qty: 90 TABLET | Refills: 3 | Status: SHIPPED | OUTPATIENT
Start: 2020-04-21 | End: 2021-07-26 | Stop reason: SDUPTHER

## 2020-04-21 RX ORDER — LOSARTAN POTASSIUM 50 MG/1
50 TABLET ORAL DAILY
Qty: 90 TABLET | Refills: 3 | Status: SHIPPED | OUTPATIENT
Start: 2020-04-21 | End: 2021-07-26 | Stop reason: SDUPTHER

## 2020-04-21 NOTE — TELEPHONE ENCOUNTER
Medicine Refill Request    Last Office Visit: Visit date not found  Next Office Visit: Visit date not found    Refill request  atorvastatin (LIPITOR) 10 mg tablet  losartan (COZAAR) 50 mg tablet  Pharm #[phone]        Current Outpatient Medications:   •  alpha lipoic acid 200 mg capsule, Take 200 mg by mouth once daily., Disp: , Rfl:   •  aspirin (ASPIR-81) 81 mg enteric coated tablet, Take 81 mg by mouth once daily., Disp: , Rfl:   •  atorvastatin (LIPITOR) 10 mg tablet, Take 1 tablet (10 mg total) by mouth daily., Disp: 90 tablet, Rfl: 3  •  calcium carbonate (CALCIUM 500) 500 mg calcium (1,250 mg) tablet, Take by mouth once daily., Disp: , Rfl:   •  cholecalciferol, vitamin D3, (VITAMIN D3) 2,000 unit tablet, Take by mouth daily., Disp: , Rfl:   •  coenzyme Q10 (CO Q-10) 200 mg capsule, Take 200 mg by mouth once daily., Disp: , Rfl:   •  diazePAM (VALIUM) 2 mg tablet, , Disp: , Rfl:   •  escitalopram (LEXAPRO) 10 mg tablet, Take 1 tablet (10 mg total) by mouth daily., Disp: 30 tablet, Rfl: 3  •  Lactobac no.41-Bifidobact no.7 (PROBIOTIC-10) 70 mg (3 billion cell) capsule, Take by mouth daily., Disp: , Rfl:   •  losartan (COZAAR) 50 mg tablet, Take 1 tablet (50 mg total) by mouth daily., Disp: 90 tablet, Rfl: 3  •  magnesium 250 mg tablet, Take 250 mg by mouth daily., Disp: , Rfl:   •  MULTIVIT WITH MINERALS/LUTEIN (MULTIVITAMIN 50 PLUS ORAL), Take by mouth once daily., Disp: , Rfl:   •  omega-3 fatty acids 1,000 mg capsule, Take by mouth once daily. Two capsules daily, Disp: , Rfl:   •  pantoprazole (PROTONIX) 40 mg EC tablet, Take 40 mg by mouth daily.  , Disp: , Rfl:   •  resveratrol 250 mg capsule, Take 250 mg by mouth daily.  , Disp: , Rfl:   •  zolpidem (AMBIEN) 5 mg tablet, Take 1 tablet (5 mg total) by mouth daily., Disp: 15 tablet, Rfl: 0      BP Readings from Last 3 Encounters:   01/10/20 126/70   11/27/19 138/76   11/18/19 136/68       Recent Lab results:  Lab Results   Component Value Date    CHOL  159 01/27/2020   ,   Lab Results   Component Value Date    HDL 82 01/27/2020   ,   Lab Results   Component Value Date    LDLCALC 71 01/27/2020   ,   Lab Results   Component Value Date    TRIG 30 01/27/2020        Lab Results   Component Value Date    GLUCOSE 100 (H) 01/27/2020   ,   Lab Results   Component Value Date    HGBA1C 5.4 12/12/2016         Lab Results   Component Value Date    CREATININE 1.04 01/27/2020       Lab Results   Component Value Date    TSH 2.930 01/27/2020

## 2020-05-04 ENCOUNTER — TELEPHONE (OUTPATIENT)
Dept: SCHEDULING | Facility: CLINIC | Age: 65
End: 2020-05-04

## 2020-05-04 NOTE — TELEPHONE ENCOUNTER
Pt calling to see if Appt. For 5/11 will need to be Telemed or OV   P#  [phone]     Pt also asking if Labs were to be done .

## 2020-05-08 NOTE — PROGRESS NOTES
You and I are about to have a telemedicine visit. This is allowed because you are already my patient and you have requested it. This telemedicine visit will be billed to your health insurance or you if you are self-insured. You understand you will be responsible for any copayments or coinsurance that apply to your telemedicine visit. Before starting our telemedicine visit I am required to get your consent for this virtual visit by telemedicine. Do you consent?    Patient response to request for consent: Yes.    The following have been reviewed and updated as appropriate in this visit.                                          [facility]    2020      Re:  DANDY LOMAX  :  1955    Dear Sami:    It was my pleasure to speak with your patient, Dandy Lomax, in the [facility] today.  This is a telemedicine visit    As you know, he was referred for follow-up therapy concerning his subclinical coronary artery disease and dyslipidemia.    The patient has been through a previous cardiac workup by Dr. Santo Tucker at [place].  A coronary calcium score was obtained which revealed significant plaquing of his LAD.  This prompted a stress study which was equivocal for ischemia.  This was followed by a diagnostic left heart catheterization in 2016 which demonstrated moderate nonobstructive LAD disease with negative FFR.  Medical management was indicated.    The patient has been on a prevention program over the past several years.  He is on Lipitor 10 mg daily.  His most recent lipid panel 20 revealed a Total cholesterol 159, LDL 71,   HDL 82,  triglycerides 30.   This is an excellent response to the use of Lipitor 10 mg daily.    His bioinflammatory markers including HSCRP and LPPLA-2 are normal.  His endothelial function is normal.  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  He has a prothrombin mutation which increases  his risk for venous thrombosis.  His homocystine level is elevated at 11.  There is no evidence of insulin resistance.  His ferritin levels are low.  His omega-3 index is 10.9.    We reviewed his medications and supplements in detail and are making no substantial changes today.  He will continue on with prevention program as outlined above.    PAST MEDICAL HISTORY:    1. Coronary artery disease, cardiac catheterization in November 2016, moderate stenosis involving the mid LAD, FFR negative  2. dyslipidemia, well controlled with Lipitor   3. gastroesophageal reflux disease  4. osteoarthritis, osteoporosis  5.  basal cell carcinoma of the skin.  6.  Right bundle branch block  7.  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    CURRENT MEDICATIONS:     Current Outpatient Medications:   •  alpha lipoic acid 200 mg capsule, Take 200 mg by mouth once daily., Disp: , Rfl:   •  aspirin (ASPIR-81) 81 mg enteric coated tablet, Take 81 mg by mouth once daily., Disp: , Rfl:   •  atorvastatin (LIPITOR) 10 mg tablet, Take 1 tablet (10 mg total) by mouth daily., Disp: 90 tablet, Rfl: 3  •  calcium carbonate (CALCIUM 500) 500 mg calcium (1,250 mg) tablet, Take by mouth once daily., Disp: , Rfl:   •  cholecalciferol, vitamin D3, (VITAMIN D3) 2,000 unit tablet, Take by mouth daily., Disp: , Rfl:   •  coenzyme Q10 (CO Q-10) 200 mg capsule, Take 200 mg by mouth once daily., Disp: , Rfl:   •  diazePAM (VALIUM) 2 mg tablet, , Disp: , Rfl:   •  escitalopram (LEXAPRO) 10 mg tablet, Take 1 tablet (10 mg total) by mouth daily., Disp: 30 tablet, Rfl: 3  •  Lactobac no.41-Bifidobact no.7 (PROBIOTIC-10) 70 mg (3 billion cell) capsule, Take by mouth daily., Disp: , Rfl:   •  losartan (COZAAR) 50 mg tablet, Take 1 tablet (50 mg total) by mouth daily., Disp: 90 tablet, Rfl: 3  •  magnesium 250 mg tablet, Take 250 mg by mouth daily., Disp: , Rfl:   •  MULTIVIT WITH MINERALS/LUTEIN (MULTIVITAMIN 50 PLUS  ORAL), Take by mouth once daily., Disp: , Rfl:   •  omega-3 fatty acids 1,000 mg capsule, Take by mouth once daily. Two capsules daily, Disp: , Rfl:   •  pantoprazole (PROTONIX) 40 mg EC tablet, Take 40 mg by mouth daily.  , Disp: , Rfl:   •  resveratrol 250 mg capsule, Take 250 mg by mouth daily.  , Disp: , Rfl:   •  zolpidem (AMBIEN) 5 mg tablet, Take 1 tablet (5 mg total) by mouth daily., Disp: 15 tablet, Rfl: 0    SUPPLEMENTS:  Multivitamin, omega-3 fish oil, vitamin-D, Reservitol, methyl-B essentials, probiotic, coenzyme-Q10, lipoic acid, calcium.  We reviewed his supplements in detail today.    ALLERGIES:  No known drug allergies.    FAMILY HISTORY:  Mother with hypertension, congestive heart failure.  Father with myocardial infarction, hypertension.    SOCIAL HISTORY:  He is .  His wife's name is Lakeshia Presley.  He has one child.  He is an , self-employed.  He does not smoke.  Rare alcohol.  Regular exercise.    REVIEW OF SYSTEMS:  The patient has snoring, uses nasal CPAP, so he does have sleep apnea, GERD, reflux.  He has been on proton pump inhibitors for 15 years.  The remainder of his 12 point review of systems is negative.    PHYSICAL EXAMINATION: Deferred.  His previous examination was reviewed below.  Weight:  162  BMI:  22.9.  Blood pressure: 136/68  HEENT:  Unremarkable.  Neck:  Supple, no JVD.  Lungs:  Clear to auscultation and percussion.  Cardiac:  Regular rate and rhythm.  Abdomen:  Soft, bowel sounds present, no organomegaly.  Extremities:  No edema, pulses intact.  Skin:  Warm and dry.  Neuro:  Alert and oriented X 3.    LABORATORY DATA:      EKG:  Normal sinus rhythm, RBBB.     Cardiac catheterization 2016:  Mild to moderate non-obstructive LAD disease. FFR LAD negative.  Minor luminal irregularities LCX and RCA.  Normal LV function EF 60%.    Stress echo November 2019:  · Stress echocardiogram reveals discordant results with abnormal ecg but normal wall  motion.  · Response to Stress: A horizontal ST segment depression of 1 mm was noted during stress in the V5 and V6 leads, beginning at 11 minutes 50 seconds of stress, and returning to baseline by 1 min of recovery.  · Post-stress echocardiogram does not meet criteria for ischemia. All ventricular walls become hyperdynamic and the ejection fraction improves.  ·   Advanced lipid panel: April 2019:  Total cholesterol 153 LDL 72 HDL 77 trig, 30 APO B 72 LP(a) 64  Inflammation panel: Normal  Endothelial function panel: Normal  Diabetes panel: Normal  Metabolic panel: Normal.  Vitamin D 66 homocystine 11  Omega-3 index 10.6  Sterol panel: Normal  Renal function panel: GFR 74    Lipid panel November 2019: Total cholesterol 162 HDL 75 LDL 76 triglycerides 37 APO B 63.    Lipid Panel 1/27/20- total cholesterol 159, HDL 82, LDL 71, Trigs 30    IMPRESSIONS/RECOMMENDATIONS:  1. Coronary artery disease.  The patient has had prior cardiac catheterization which demonstrated nonobstructive LAD coronary artery disease.  His recent stress echo November 2019 demonstrated normal wall motion.  He did have borderline EKG changes at peak exercise. The patient needs an aggressive risk factor modification program.  He will continue baby aspirin.   2. Dyslipidemia.  Continue Lipitor.  He has reached goal.  3. 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  The patient is at increased risk for progressive atherosclerosis.  4. Prothrombin mutation.  The patient is at increased risk of DVT.  5. Hyperhomocystinemia.  The patient should continue with B-complex and L5 methyl folate.  6. Obstructive sleep apnea.  The patient will continue nasal CPAP.  7. Borderline hypertension.  Patient will monitor his blood pressures at home.  Our goal is a blood pressure less than 130/80.  He continues on losartan 50 mg daily.  8. Anxiety disorder.  9. GERD and irritable bowel syndrome.  Stable    Summary: Dandy is demonstrating cardiovascular stability. .  We reviewed his prevention  program in detail.  We are making no substantial changes today.    This was a 25-minute telemedicine preventive cardiology visit.    Sincerely,    Nnamdi Gustafson MD  5/11/2020

## 2020-05-11 ENCOUNTER — TELEMEDICINE (OUTPATIENT)
Dept: CARDIOLOGY | Facility: CLINIC | Age: 65
End: 2020-05-11
Payer: MEDICARE

## 2020-05-11 DIAGNOSIS — R79.83 HYPERHOMOCYSTINEMIA: ICD-10-CM

## 2020-05-11 DIAGNOSIS — E78.5 DYSLIPIDEMIA: Primary | ICD-10-CM

## 2020-05-11 DIAGNOSIS — I10 ESSENTIAL HYPERTENSION: ICD-10-CM

## 2020-05-11 DIAGNOSIS — I25.10 CORONARY ARTERY DISEASE INVOLVING NATIVE CORONARY ARTERY OF NATIVE HEART WITHOUT ANGINA PECTORIS: ICD-10-CM

## 2020-05-11 DIAGNOSIS — E55.9 VITAMIN D DEFICIENCY: ICD-10-CM

## 2020-05-11 PROCEDURE — 99443 PR PHYS/QHP TELEPHONE EVALUATION 21-30 MIN: CPT | Mod: 95 | Performed by: INTERNAL MEDICINE

## 2020-05-11 NOTE — LETTER
May 11, 2020     Sami Lomax, DO  [address] [facility] [address]  [place] [place] [address]    Patient: Hilda Lomax  YOB: 1955  Date of Visit: 2020      Dear Dr. Lomax:    Thank you for referring Hilda Lomax to me for evaluation. Below are my notes for this consultation.    If you have questions, please do not hesitate to call me. I look forward to following your patient along with you.         Sincerely,        Nnamdi Gustafson MD        CC: No Recipients  Nnamdi Gustafson MD  2020 10:53 AM  Sign at close encounter  You and I are about to have a telemedicine visit. This is allowed because you are already my patient and you have requested it. This telemedicine visit will be billed to your health insurance or you if you are self-insured. You understand you will be responsible for any copayments or coinsurance that apply to your telemedicine visit. Before starting our telemedicine visit I am required to get your consent for this virtual visit by telemedicine. Do you consent?    Patient response to request for consent: Yes.    The following have been reviewed and updated as appropriate in this visit.                                          [facility]    2020  Jaylon Irvin MD  [address]  [place] [address]    Re:  HILDA LOMAX  :  1955    Dear Jaylon:    It was my pleasure to speak with your patient, Hilda Lomax, in the [facility] today.  This is a telemedicine visit    As you know, he was referred for follow-up therapy concerning his subclinical coronary artery disease and dyslipidemia.    The patient has been through a previous cardiac workup by Dr. Santo Tucker at [place].  A coronary calcium score was obtained which revealed significant plaquing of his LAD.  This prompted a stress study which was equivocal for ischemia.  This was followed by a diagnostic left heart catheterization in 2016  which demonstrated moderate nonobstructive LAD disease with negative FFR.  Medical management was indicated.    The patient has been on a prevention program over the past several years.  He is on Lipitor 10 mg daily.  His most recent lipid panel 1/27/20 revealed a Total cholesterol 159, LDL 71,   HDL 82,  triglycerides 30.   This is an excellent response to the use of Lipitor 10 mg daily.    His bioinflammatory markers including HSCRP and LPPLA-2 are normal.  His endothelial function is normal.  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  He has a prothrombin mutation which increases his risk for venous thrombosis.  His homocystine level is elevated at 11.  There is no evidence of insulin resistance.  His ferritin levels are low.  His omega-3 index is 10.9.    We reviewed his medications and supplements in detail and are making no substantial changes today.  He will continue on with prevention program as outlined above.    PAST MEDICAL HISTORY:    1. Coronary artery disease, cardiac catheterization in November 2016, moderate stenosis involving the mid LAD, FFR negative  2. dyslipidemia, well controlled with Lipitor   3. gastroesophageal reflux disease  4. osteoarthritis, osteoporosis  5.  basal cell carcinoma of the skin.  6.  Right bundle branch block  7.  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    CURRENT MEDICATIONS:     Current Outpatient Medications:   •  alpha lipoic acid 200 mg capsule, Take 200 mg by mouth once daily., Disp: , Rfl:   •  aspirin (ASPIR-81) 81 mg enteric coated tablet, Take 81 mg by mouth once daily., Disp: , Rfl:   •  atorvastatin (LIPITOR) 10 mg tablet, Take 1 tablet (10 mg total) by mouth daily., Disp: 90 tablet, Rfl: 3  •  calcium carbonate (CALCIUM 500) 500 mg calcium (1,250 mg) tablet, Take by mouth once daily., Disp: , Rfl:   •  cholecalciferol, vitamin D3, (VITAMIN D3) 2,000 unit  tablet, Take by mouth daily., Disp: , Rfl:   •  coenzyme Q10 (CO Q-10) 200 mg capsule, Take 200 mg by mouth once daily., Disp: , Rfl:   •  diazePAM (VALIUM) 2 mg tablet, , Disp: , Rfl:   •  escitalopram (LEXAPRO) 10 mg tablet, Take 1 tablet (10 mg total) by mouth daily., Disp: 30 tablet, Rfl: 3  •  Lactobac no.41-Bifidobact no.7 (PROBIOTIC-10) 70 mg (3 billion cell) capsule, Take by mouth daily., Disp: , Rfl:   •  losartan (COZAAR) 50 mg tablet, Take 1 tablet (50 mg total) by mouth daily., Disp: 90 tablet, Rfl: 3  •  magnesium 250 mg tablet, Take 250 mg by mouth daily., Disp: , Rfl:   •  MULTIVIT WITH MINERALS/LUTEIN (MULTIVITAMIN 50 PLUS ORAL), Take by mouth once daily., Disp: , Rfl:   •  omega-3 fatty acids 1,000 mg capsule, Take by mouth once daily. Two capsules daily, Disp: , Rfl:   •  pantoprazole (PROTONIX) 40 mg EC tablet, Take 40 mg by mouth daily.  , Disp: , Rfl:   •  resveratrol 250 mg capsule, Take 250 mg by mouth daily.  , Disp: , Rfl:   •  zolpidem (AMBIEN) 5 mg tablet, Take 1 tablet (5 mg total) by mouth daily., Disp: 15 tablet, Rfl: 0    SUPPLEMENTS:  Multivitamin, omega-3 fish oil, vitamin-D, Reservitol, methyl-B essentials, probiotic, coenzyme-Q10, lipoic acid, calcium.  We reviewed his supplements in detail today.    ALLERGIES:  No known drug allergies.    FAMILY HISTORY:  Mother with hypertension, congestive heart failure.  Father with myocardial infarction, hypertension.    SOCIAL HISTORY:  He is .  His wife's name is Lakeshia Presley.  He has one child.  He is an , self-employed.  He does not smoke.  Rare alcohol.  Regular exercise.    REVIEW OF SYSTEMS:  The patient has snoring, uses nasal CPAP, so he does have sleep apnea, GERD, reflux.  He has been on proton pump inhibitors for 15 years.  The remainder of his 12 point review of systems is negative.    PHYSICAL EXAMINATION: Deferred.  His previous examination was reviewed below.  Weight:  162  BMI:  22.9.  Blood  pressure: 136/68  HEENT:  Unremarkable.  Neck:  Supple, no JVD.  Lungs:  Clear to auscultation and percussion.  Cardiac:  Regular rate and rhythm.  Abdomen:  Soft, bowel sounds present, no organomegaly.  Extremities:  No edema, pulses intact.  Skin:  Warm and dry.  Neuro:  Alert and oriented X 3.    LABORATORY DATA:      EKG:  Normal sinus rhythm, RBBB.     Cardiac catheterization 2016:  Mild to moderate non-obstructive LAD disease. FFR LAD negative.  Minor luminal irregularities LCX and RCA.  Normal LV function EF 60%.    Stress echo November 2019:  · Stress echocardiogram reveals discordant results with abnormal ecg but normal wall motion.  · Response to Stress: A horizontal ST segment depression of 1 mm was noted during stress in the V5 and V6 leads, beginning at 11 minutes 50 seconds of stress, and returning to baseline by 1 min of recovery.  · Post-stress echocardiogram does not meet criteria for ischemia. All ventricular walls become hyperdynamic and the ejection fraction improves.  ·   Advanced lipid panel: April 2019:  Total cholesterol 153 LDL 72 HDL 77 trig, 30 APO B 72 LP(a) 64  Inflammation panel: Normal  Endothelial function panel: Normal  Diabetes panel: Normal  Metabolic panel: Normal.  Vitamin D 66 homocystine 11  Omega-3 index 10.6  Sterol panel: Normal  Renal function panel: GFR 74    Lipid panel November 2019: Total cholesterol 162 HDL 75 LDL 76 triglycerides 37 APO B 63.    Lipid Panel 1/27/20- total cholesterol 159, HDL 82, LDL 71, Trigs 30    IMPRESSIONS/RECOMMENDATIONS:  1. Coronary artery disease.  The patient has had prior cardiac catheterization which demonstrated nonobstructive LAD coronary artery disease.  His recent stress echo November 2019 demonstrated normal wall motion.  He did have borderline EKG changes at peak exercise. The patient needs an aggressive risk factor modification program.  He will continue baby aspirin.   2. Dyslipidemia.  Continue Lipitor.  He has reached  goal.  3. 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  The patient is at increased risk for progressive atherosclerosis.  4. Prothrombin mutation.  The patient is at increased risk of DVT.  5. Hyperhomocystinemia.  The patient should continue with B-complex and L5 methyl folate.  6. Obstructive sleep apnea.  The patient will continue nasal CPAP.  7. Borderline hypertension.  Patient will monitor his blood pressures at home.  Our goal is a blood pressure less than 130/80.  He continues on losartan 50 mg daily.  8. Anxiety disorder.  9. GERD and irritable bowel syndrome.  Stable    Summary: Dandy is demonstrating cardiovascular stability. .  We reviewed his prevention program in detail.  We are making no substantial changes today.    This was a 25-minute telemedicine preventive cardiology visit.    Sincerely,    Nnamdi Gustafson MD  5/11/2020

## 2020-05-13 ENCOUNTER — TELEPHONE (OUTPATIENT)
Dept: CARDIOLOGY | Facility: CLINIC | Age: 65
End: 2020-05-13

## 2020-05-13 NOTE — TELEPHONE ENCOUNTER
Left message for patient regarding scheduling 6 month follow-up with Dr. Gustafson. If patient calls back please schedule or transfer to ext. [phone] for scheduling. Thanks

## 2020-08-05 ENCOUNTER — TELEPHONE (OUTPATIENT)
Dept: PRIMARY CARE | Facility: CLINIC | Age: 65
End: 2020-08-05

## 2020-08-05 NOTE — TELEPHONE ENCOUNTER
Incoming VM left on PVM from pt. Would like the generic  intermezzo sublingual rx for Ambien that comes 1.7mg ,5mg ,3.5mg. Pt would like to try this . Pt  stated you can call him at  [phone] with any questions

## 2020-08-06 ENCOUNTER — TELEPHONE (OUTPATIENT)
Dept: PRIMARY CARE | Facility: CLINIC | Age: 65
End: 2020-08-06

## 2020-08-06 RX ORDER — ZOLPIDEM TARTRATE SUBLINGUAL 3.5 MG/1
TABLET SUBLINGUAL
Qty: 30 TABLET | Refills: 0 | Status: SHIPPED | OUTPATIENT
Start: 2020-08-06 | End: 2020-08-07 | Stop reason: SDUPTHER

## 2020-08-06 NOTE — TELEPHONE ENCOUNTER
I have queried the state Prescription Drug Monitoring Program [PDMP] and found no suspicious PDMP activity --re question about new ambien prep.

## 2020-08-06 NOTE — TELEPHONE ENCOUNTER
pc--pt uses ambien sparingly, breaks tabs 1/4-1/2 when awakens in [place]--doesn't feel often enough to warrant incr ssri--wishes intermezzo--aware may not be covered on [facility]

## 2020-08-07 ENCOUNTER — TELEPHONE (OUTPATIENT)
Dept: PRIMARY CARE | Facility: CLINIC | Age: 65
End: 2020-08-07

## 2020-08-07 RX ORDER — ZOLPIDEM TARTRATE SUBLINGUAL 3.5 MG/1
TABLET SUBLINGUAL
Qty: 6 TABLET | Refills: 0 | Status: SHIPPED | OUTPATIENT
Start: 2020-08-07 | End: 2020-10-21 | Stop reason: ALTCHOICE

## 2020-08-07 NOTE — TELEPHONE ENCOUNTER
Spoke to patient he states that he is going to pay out of pocket but is asking if you can resend the script for 6 tablets. Takes infrequently so only wants to pay for 6.

## 2020-08-07 NOTE — TELEPHONE ENCOUNTER
Voicemail from pt - stating the intermezzo is not covered.  He is willing to pay out of pocket but wanted to decrease the qty to 6pills.    I called pharmacy to update the prescription.

## 2020-08-07 NOTE — TELEPHONE ENCOUNTER
Incoming fax from [facility]. Rx Zolpidem tart 3.5 mg isnt covered by insurance. Please send alternative

## 2020-08-07 NOTE — TELEPHONE ENCOUNTER
While speaking to patient, he states that he has a history of varicose veins rle. He states he noticed a small red area on his calf that was about 1cm yesterday and today is about 2 cm. He states that he thinks it may be a phlebitis. I advised that he keep an eye on it and if redness worsens, he develops any warmth or increased pain he should seek medical attention. Asked him if he thinks he could have been bitten by anything which he denies. (thought possible cellulitis).I recommended UC but patient does not want to go, will keep an eye over the weekend and seek medical attention if it worsens.

## 2020-08-10 NOTE — TELEPHONE ENCOUNTER
Please let patient know I got the above message--I would like to see him if his symptoms did not completely resolve, thanks

## 2020-10-13 NOTE — TELEPHONE ENCOUNTER
Voicemail from pt     Medicine Refill Request    Last Office Visit: 1/10/2020  Last Telemedicine Visit: Visit date not found    Next Office Visit: Visit date not found  Next Telemedicine Visit: Visit date not found         Current Outpatient Medications:   •  alpha lipoic acid 200 mg capsule, Take 200 mg by mouth once daily., Disp: , Rfl:   •  aspirin (ASPIR-81) 81 mg enteric coated tablet, Take 81 mg by mouth once daily., Disp: , Rfl:   •  atorvastatin (LIPITOR) 10 mg tablet, Take 1 tablet (10 mg total) by mouth daily., Disp: 90 tablet, Rfl: 3  •  calcium carbonate (CALCIUM 500) 500 mg calcium (1,250 mg) tablet, Take by mouth once daily., Disp: , Rfl:   •  cholecalciferol, vitamin D3, (VITAMIN D3) 2,000 unit tablet, Take by mouth daily., Disp: , Rfl:   •  coenzyme Q10 (CO Q-10) 200 mg capsule, Take 200 mg by mouth once daily., Disp: , Rfl:   •  diazePAM (VALIUM) 2 mg tablet, , Disp: , Rfl:   •  escitalopram (LEXAPRO) 10 mg tablet, Take 1 tablet (10 mg total) by mouth daily., Disp: 30 tablet, Rfl: 3  •  Lactobac no.41-Bifidobact no.7 (PROBIOTIC-10) 70 mg (3 billion cell) capsule, Take by mouth daily., Disp: , Rfl:   •  losartan (COZAAR) 50 mg tablet, Take 1 tablet (50 mg total) by mouth daily., Disp: 90 tablet, Rfl: 3  •  magnesium 250 mg tablet, Take 250 mg by mouth daily., Disp: , Rfl:   •  MULTIVIT WITH MINERALS/LUTEIN (MULTIVITAMIN 50 PLUS ORAL), Take by mouth once daily., Disp: , Rfl:   •  omega-3 fatty acids 1,000 mg capsule, Take by mouth once daily. Two capsules daily, Disp: , Rfl:   •  pantoprazole (PROTONIX) 40 mg EC tablet, Take 40 mg by mouth daily.  , Disp: , Rfl:   •  resveratrol 250 mg capsule, Take 250 mg by mouth daily.  , Disp: , Rfl:   •  zolpidem (AMBIEN) 5 mg tablet, Take 1 tablet (5 mg total) by mouth daily., Disp: 15 tablet, Rfl: 0  •  zolpidem (INTERMEZZO) 3.5 mg tablet, sublingual, 1 hs prn insomnia, Disp: 6 tablet, Rfl: 0      BP Readings from Last 3 Encounters:   01/10/20 126/70   11/27/19  138/76   11/18/19 136/68       Recent Lab results:  Lab Results   Component Value Date    CHOL 159 01/27/2020   ,   Lab Results   Component Value Date    HDL 82 01/27/2020   ,   Lab Results   Component Value Date    LDLCALC 71 01/27/2020   ,   Lab Results   Component Value Date    TRIG 30 01/27/2020        Lab Results   Component Value Date    GLUCOSE 100 (H) 01/27/2020   ,   Lab Results   Component Value Date    HGBA1C 5.4 12/12/2016         Lab Results   Component Value Date    CREATININE 1.04 01/27/2020       Lab Results   Component Value Date    TSH 2.930 01/27/2020

## 2020-10-14 RX ORDER — ZOLPIDEM TARTRATE 5 MG/1
5 TABLET ORAL DAILY
Qty: 30 TABLET | Refills: 0 | Status: SHIPPED | OUTPATIENT
Start: 2020-10-14 | End: 2021-07-01 | Stop reason: SDUPTHER

## 2020-10-21 ENCOUNTER — OFFICE VISIT (OUTPATIENT)
Dept: PRIMARY CARE | Facility: CLINIC | Age: 65
End: 2020-10-21
Payer: MEDICARE

## 2020-10-21 VITALS
RESPIRATION RATE: 18 BRPM | TEMPERATURE: 98.7 F | WEIGHT: 164.4 LBS | BODY MASS INDEX: 23.54 KG/M2 | HEIGHT: 70 IN | OXYGEN SATURATION: 96 % | DIASTOLIC BLOOD PRESSURE: 70 MMHG | HEART RATE: 64 BPM | SYSTOLIC BLOOD PRESSURE: 120 MMHG

## 2020-10-21 DIAGNOSIS — Z92.241 S/P EPIDURAL STEROID INJECTION: ICD-10-CM

## 2020-10-21 DIAGNOSIS — G60.9 IDIOPATHIC PERIPHERAL NEUROPATHY: ICD-10-CM

## 2020-10-21 DIAGNOSIS — K11.5 SALIVARY STONES: ICD-10-CM

## 2020-10-21 DIAGNOSIS — F41.9 ANXIETY DISORDER, UNSPECIFIED TYPE: ICD-10-CM

## 2020-10-21 DIAGNOSIS — Z87.438 HISTORY OF PROSTATITIS: ICD-10-CM

## 2020-10-21 DIAGNOSIS — I25.10 CORONARY ARTERY DISEASE INVOLVING NATIVE CORONARY ARTERY OF NATIVE HEART WITHOUT ANGINA PECTORIS: ICD-10-CM

## 2020-10-21 DIAGNOSIS — R35.1 NOCTURIA: ICD-10-CM

## 2020-10-21 DIAGNOSIS — K21.9 GASTROESOPHAGEAL REFLUX DISEASE, UNSPECIFIED WHETHER ESOPHAGITIS PRESENT: ICD-10-CM

## 2020-10-21 DIAGNOSIS — M51.369 DDD (DEGENERATIVE DISC DISEASE), LUMBAR: ICD-10-CM

## 2020-10-21 DIAGNOSIS — J45.909 ASTHMA, UNSPECIFIED ASTHMA SEVERITY, UNSPECIFIED WHETHER COMPLICATED, UNSPECIFIED WHETHER PERSISTENT: ICD-10-CM

## 2020-10-21 DIAGNOSIS — N39.41 URGE INCONTINENCE: ICD-10-CM

## 2020-10-21 DIAGNOSIS — Z23 IMMUNIZATION DUE: ICD-10-CM

## 2020-10-21 DIAGNOSIS — G47.33 OSA (OBSTRUCTIVE SLEEP APNEA): ICD-10-CM

## 2020-10-21 DIAGNOSIS — G47.00 INSOMNIA, UNSPECIFIED TYPE: ICD-10-CM

## 2020-10-21 DIAGNOSIS — I10 ESSENTIAL HYPERTENSION: ICD-10-CM

## 2020-10-21 DIAGNOSIS — K59.00 CONSTIPATION, UNSPECIFIED CONSTIPATION TYPE: Primary | ICD-10-CM

## 2020-10-21 DIAGNOSIS — M81.0 OSTEOPOROSIS, UNSPECIFIED OSTEOPOROSIS TYPE, UNSPECIFIED PATHOLOGICAL FRACTURE PRESENCE: ICD-10-CM

## 2020-10-21 PROCEDURE — G0008 ADMIN INFLUENZA VIRUS VAC: HCPCS | Performed by: FAMILY MEDICINE

## 2020-10-21 PROCEDURE — 90694 VACC AIIV4 NO PRSRV 0.5ML IM: CPT | Performed by: FAMILY MEDICINE

## 2020-10-21 PROCEDURE — 99214 OFFICE O/P EST MOD 30 MIN: CPT | Mod: 25 | Performed by: FAMILY MEDICINE

## 2020-10-21 ASSESSMENT — ENCOUNTER SYMPTOMS
ABDOMINAL PAIN: 0
ARTHRALGIAS: 0
CONSTITUTIONAL NEGATIVE: 1
SHORTNESS OF BREATH: 0
DIZZINESS: 0
BLOOD IN STOOL: 0
NEUROLOGICAL NEGATIVE: 1
ALLERGIC/IMMUNOLOGIC NEGATIVE: 1
DIARRHEA: 0
COUGH: 0
RESPIRATORY NEGATIVE: 1
UNEXPECTED WEIGHT CHANGE: 0
MUSCULOSKELETAL NEGATIVE: 1
EYES NEGATIVE: 1
CONSTIPATION: 1
VOMITING: 0
ABDOMINAL DISTENTION: 0
NAUSEA: 0
WEAKNESS: 0
CARDIOVASCULAR NEGATIVE: 1
FEVER: 0
APPETITE CHANGE: 0
ENDOCRINE NEGATIVE: 1
HEMATOLOGIC/LYMPHATIC NEGATIVE: 1
CHILLS: 0
MYALGIAS: 0
PSYCHIATRIC NEGATIVE: 1

## 2020-10-21 NOTE — PROGRESS NOTES
Subjective      Patient ID: Dandy Lomax is a 65 y.o. male.    For fluv today, constip--bm slowing down, otherwise no complts--interval updates--saw pulm--poss minor asthma, gerd related; sees [facility] (back to qd ppi, 2yr fu egd next summer), cardio dr jane (q6mo/nov, lab, no chgs); integrative dr [facility]--osteoporosis --tx naturally stable past 4 yrs, worse w [facility];  l5-s1--dr santillan neurosurg [facility]--lammy past march; uro prostatitis, bx neg 10 ya--annual/lab--1.9 psa june; dc lexapro --sleep fine overall--occas ambien--no further psych/retired; ent fu salivary stones in may--[facility], tele; mery/sleep --has cpap; had colo/mri abd/us--pneumo 23--may need prevnar      The following have been reviewed and updated as appropriate in this visit:  Tobacco  Allergies  Meds  Problems  Med Hx  Surg Hx  Fam Hx       Patient Active Problem List   Diagnosis   • Coronary artery disease   • Dyslipidemia   • Prothrombin mutation (CMS/HCC)   • Hyperhomocystinemia (CMS/HCC)   • MERY (obstructive sleep apnea)   • Essential hypertension   • Anxiety disorder   • GERD (gastroesophageal reflux disease)   • Varicose veins of legs   • Trigger ring finger of right hand   • Osteoporosis   • Knee pain, bilateral   • Insomnia   • Cyclic neutropenia (CMS/HCC)   • Chest tightness   • Cough   • Allergic rhinitis   • Dyspnea   • Bruising   • DDD (degenerative disc disease), lumbar   • Low testosterone   • Constipation   • Lumbar stenosis without neurogenic claudication   • Lumbar radiculopathy   • Lumbar facet arthropathy   • Medicare annual wellness visit, initial   • Vitamin D deficiency   • Asthma   • History of prostatitis   • Dermatitis   • Immunization due   • S/P epidural steroid injection   • Salivary stones   • Urge incontinence   • Nocturia   • Idiopathic peripheral neuropathy     Past Surgical History:   Procedure Laterality Date   • CATARACT EXTRACTION     • COLONOSCOPY     • ESOPHAGOGASTRODUODENOSCOPY     • LUMBAR LAMINECTOMY        Family History   Problem Relation Age of Onset   • Hypertension Biological Mother    • Heart failure Biological Mother    • Uterine cancer Biological Mother    • Heart attack Biological Father    • Hypertension Biological Father    • COPD Biological Father    • No Known Problems Biological Sister      Social History     Socioeconomic History   • Marital status:      Spouse name: Not on file   • Number of children: 1   • Years of education: Not on file   • Highest education level: Not on file   Occupational History   • Occupation:    Social Needs   • Financial resource strain: Not on file   • Food insecurity     Worry: Not on file     Inability: Not on file   • Transportation needs     Medical: Not on file     Non-medical: Not on file   Tobacco Use   • Smoking status: Never Smoker   • Smokeless tobacco: Never Used   Substance and Sexual Activity   • Alcohol use: Yes     Comment: Rare alcohol   • Drug use: No   • Sexual activity: Not on file   Lifestyle   • Physical activity     Days per week: Not on file     Minutes per session: Not on file   • Stress: Not on file   Relationships   • Social connections     Talks on phone: Not on file     Gets together: Not on file     Attends Voodoo service: Not on file     Active member of club or organization: Not on file     Attends meetings of clubs or organizations: Not on file     Relationship status: Not on file   • Intimate partner violence     Fear of current or ex partner: Not on file     Emotionally abused: Not on file     Physically abused: Not on file     Forced sexual activity: Not on file   Other Topics Concern   • Not on file   Social History Narrative    One son, 10 y/o.        Review of Systems   Constitutional: Negative.  Negative for appetite change, chills, fever and unexpected weight change.   HENT: Negative.    Eyes: Negative.    Respiratory: Negative.  Negative for cough and shortness of breath.    Cardiovascular:  Negative.  Negative for chest pain.   Gastrointestinal: Positive for constipation. Negative for abdominal distention, abdominal pain, blood in stool, diarrhea, nausea and vomiting.        Goes qd hard, eats a lot of fiber, last few yrs   Endocrine: Negative.    Genitourinary: Negative.         Urge incont nocturia   Musculoskeletal: Negative.  Negative for arthralgias and myalgias.        Some improvement initially w back--needs PT   Skin: Negative.  Negative for rash.   Allergic/Immunologic: Negative.    Neurological: Negative.  Negative for dizziness, syncope and weakness.   Hematological: Negative.    Psychiatric/Behavioral: Negative.        No Known Allergies  Current Outpatient Medications   Medication Sig Dispense Refill   • alpha lipoic acid 200 mg capsule Take 200 mg by mouth once daily.     • aspirin (ASPIR-81) 81 mg enteric coated tablet Take 81 mg by mouth once daily.     • atorvastatin (LIPITOR) 10 mg tablet Take 1 tablet (10 mg total) by mouth daily. 90 tablet 3   • cholecalciferol, vitamin D3, (VITAMIN D3) 2,000 unit tablet Take by mouth daily.     • coenzyme Q10 (CO Q-10) 200 mg capsule Take 200 mg by mouth once daily.     • diazePAM (VALIUM) 2 mg tablet      • escitalopram (LEXAPRO) 10 mg tablet Take 1 tablet (10 mg total) by mouth daily. 30 tablet 3   • Lactobac no.41-Bifidobact no.7 (PROBIOTIC-10) 70 mg (3 billion cell) capsule Take by mouth daily.     • losartan (COZAAR) 50 mg tablet Take 1 tablet (50 mg total) by mouth daily. 90 tablet 3   • magnesium 250 mg tablet Take 250 mg by mouth daily.     • MULTIVIT WITH MINERALS/LUTEIN (MULTIVITAMIN 50 PLUS ORAL) Take by mouth once daily.     • omega-3 fatty acids 1,000 mg capsule Take by mouth once daily. Two capsules daily     • pantoprazole (PROTONIX) 40 mg EC tablet Take 40 mg by mouth daily.       • resveratrol 250 mg capsule Take 250 mg by mouth daily.       • zolpidem (AMBIEN) 5 mg tablet Take 1 tablet (5 mg total) by mouth daily. 30 tablet 0  "    No current facility-administered medications for this visit.        Objective   Vitals:    10/21/20 1446   BP: 120/70   BP Location: Left upper arm   Patient Position: Sitting   Pulse: 64   Resp: 18   Temp: 37.1 °C (98.7 °F)   SpO2: 96%   Weight: 74.6 kg (164 lb 6.4 oz)   Height: 1.778 m (5' 10\")       Physical Exam  Vitals signs reviewed.   Constitutional:       General: He is not in acute distress.     Appearance: Normal appearance. He is well-developed. He is not ill-appearing.      Comments: Pleasant man   HENT:      Head: Normocephalic and atraumatic.      Right Ear: Tympanic membrane, ear canal and external ear normal.      Left Ear: Tympanic membrane, ear canal and external ear normal.      Nose: Nose normal.   Eyes:      Extraocular Movements: Extraocular movements intact.      Conjunctiva/sclera: Conjunctivae normal.      Pupils: Pupils are equal, round, and reactive to light.   Neck:      Musculoskeletal: Normal range of motion and neck supple.      Thyroid: No thyromegaly.      Vascular: No carotid bruit.   Cardiovascular:      Rate and Rhythm: Normal rate and regular rhythm.      Heart sounds: Normal heart sounds. No murmur.   Pulmonary:      Effort: Pulmonary effort is normal.      Breath sounds: Normal breath sounds.   Abdominal:      General: Bowel sounds are normal.      Palpations: Abdomen is soft.      Tenderness: There is no abdominal tenderness.   Musculoskeletal:      Comments: Neg acute findings   Lymphadenopathy:      Cervical: No cervical adenopathy.   Skin:     General: Skin is warm and dry.   Neurological:      Mental Status: He is alert and oriented to person, place, and time.   Psychiatric:         Mood and Affect: Mood normal.         Behavior: Behavior normal.         Thought Content: Thought content normal.         Judgment: Judgment normal.         Assessment/Plan   Diagnoses and all orders for this visit:    Constipation, unspecified constipation type (Primary)  Assessment & " Plan:  Reviewed qd fiber in detail, incr activity/fluids to robbie      Gastroesophageal reflux disease, unspecified whether esophagitis present  Assessment & Plan:  Cont per gi, ppi      Asthma, unspecified asthma severity, unspecified whether complicated, unspecified whether persistent  Assessment & Plan:  ?MDI prn--avoid triggers incldg GERD      MERY (obstructive sleep apnea)  Assessment & Plan:  Cont per pulm, cpap      DDD (degenerative disc disease), lumbar  Assessment & Plan:  Cont per surg--PT      Idiopathic peripheral neuropathy    S/P epidural steroid injection    Coronary artery disease involving native coronary artery of native heart without angina pectoris  Assessment & Plan:  Cont per cardio/lab, stable      Essential hypertension  Assessment & Plan:  Stable, cont rx      History of prostatitis    Nocturia  Assessment & Plan:  ?bph      Urge incontinence  Assessment & Plan:  Cont per uro--?rx prn      Anxiety disorder, unspecified type  Assessment & Plan:  Stable, apparently doesn't take ssri regularly      Insomnia, unspecified type  Assessment & Plan:  Stable, utilizes ambien prn (dis tabs not covered by ins)      Osteoporosis, unspecified osteoporosis type, unspecified pathological fracture presence  Assessment & Plan:  Sees [facility] practitioner (?rheum, endo prn)      Salivary stones  Assessment & Plan:  F/u per ENT      Immunization due  Assessment & Plan:  May need prevnar (pt will check records and apprise)      Other orders  -     Influenza vaccine Quad Adjuvanted 65 and Older (FluAd Quad)        Sami Lomax, DO    10/26/2020

## 2020-10-23 ENCOUNTER — TELEPHONE (OUTPATIENT)
Dept: CARDIOLOGY | Facility: CLINIC | Age: 65
End: 2020-10-23

## 2020-10-23 NOTE — TELEPHONE ENCOUNTER
Patient of Dr Gustafson. Patient seen via telemed on 5/11 at which time CMP and Lipid panel were ordered. Patient is asking if he should be having the Lipid panel done or something like the [facility] work up. Please call him back and let him know.

## 2020-10-26 PROBLEM — Z92.241 S/P EPIDURAL STEROID INJECTION: Status: ACTIVE | Noted: 2020-10-26

## 2020-10-26 PROBLEM — K11.5 SALIVARY STONES: Status: ACTIVE | Noted: 2020-10-26

## 2020-10-26 PROBLEM — R35.1 NOCTURIA: Status: ACTIVE | Noted: 2020-10-26

## 2020-10-26 PROBLEM — Z23 IMMUNIZATION DUE: Status: ACTIVE | Noted: 2020-10-26

## 2020-10-26 PROBLEM — G60.9 IDIOPATHIC PERIPHERAL NEUROPATHY: Status: ACTIVE | Noted: 2020-10-26

## 2020-10-26 PROBLEM — N39.41 URGE INCONTINENCE: Status: ACTIVE | Noted: 2020-10-26

## 2020-11-02 NOTE — TELEPHONE ENCOUNTER
Pt returning Call to speak with Reshma regarding Lab Work.     Pt can be reached @ [phone].     TY

## 2020-11-04 NOTE — TELEPHONE ENCOUNTER
Seding patient for [facility] heart labs he is aware results will most likely not be back by appt time did not want to change to telemed

## 2020-11-18 ENCOUNTER — TELEPHONE (OUTPATIENT)
Dept: CARDIOLOGY | Facility: CLINIC | Age: 65
End: 2020-11-18

## 2020-11-18 ENCOUNTER — TELEPHONE (OUTPATIENT)
Dept: SCHEDULING | Facility: CLINIC | Age: 65
End: 2020-11-18

## 2020-11-18 NOTE — TELEPHONE ENCOUNTER
Pt has not been able to complete labs prior to OV on 11/20 and is wondering if he should reschedule? Pt can be reached at #[phone].

## 2020-11-18 NOTE — TELEPHONE ENCOUNTER
Maribel, I called pt to remind him regarding lab work. He stated that he just got the [facility] lab order from us, and he is going to have the lab drawn at the same day. Okay to do this and call him back with his result? Pt preferred not to cancel his appt. His number is [phone].

## 2020-11-19 NOTE — TELEPHONE ENCOUNTER
Maribel,  We want to come on his day of his appt, and have the lab drawn at the same day. If you see him as a telemed visit, he won't be able to have lab draw. I just want to clarify this with you. Pt didn't request a telemed visit.

## 2020-11-20 ENCOUNTER — OFFICE VISIT (OUTPATIENT)
Dept: CARDIOLOGY | Facility: CLINIC | Age: 65
End: 2020-11-20
Payer: MEDICARE

## 2020-11-20 VITALS
OXYGEN SATURATION: 99 % | HEART RATE: 64 BPM | HEIGHT: 71 IN | BODY MASS INDEX: 22.4 KG/M2 | RESPIRATION RATE: 16 BRPM | WEIGHT: 160 LBS | DIASTOLIC BLOOD PRESSURE: 80 MMHG | SYSTOLIC BLOOD PRESSURE: 130 MMHG

## 2020-11-20 DIAGNOSIS — E78.5 DYSLIPIDEMIA: Primary | ICD-10-CM

## 2020-11-20 DIAGNOSIS — I25.10 CORONARY ARTERY DISEASE INVOLVING NATIVE CORONARY ARTERY OF NATIVE HEART WITHOUT ANGINA PECTORIS: ICD-10-CM

## 2020-11-20 PROCEDURE — 93000 ELECTROCARDIOGRAM COMPLETE: CPT | Performed by: INTERNAL MEDICINE

## 2020-11-20 PROCEDURE — 99214 OFFICE O/P EST MOD 30 MIN: CPT | Performed by: INTERNAL MEDICINE

## 2020-11-20 NOTE — PATIENT INSTRUCTIONS
[employer] Supplement website    [employer]    [phone]    OmegAvail™ [id number] is a highly potent, non-GMO fish oil, containing an impressive 1,000 mg omega-3 oils per softgel, making it an ideal choice for therapeutic dosing. Each softgel contains 662mg EPA and 250mg DHA, along with other omega-3 fatty acids. As with all [employer] fish oil products, OmegAvail™ [id number] contains the triglyceride (TG) form for superior absorption and bioavailability.  [employer] Supplement website    [employer] [phone]     Vitamin D Supreme provides a clinically useful dose of vitamin D3 along with vitamin K as both K1 and the MK-7 form of K2. This formula contains higher therapeutic doses than Vitamin D Synergy for situations where more aggressive repletion is required. Vitamins D and K are essential for optimal bone and arterial health and for maintaining the immune system in proper balance. The amount of vitamin D and K in this formula may be beneficial for those who do not get adequate sun exposure and/or dietary sources of these vitamins. Vitamins D & K work as a team. Thus, increasing doses of vitamin D will increase the need for vitamin K.    Made with non-GMO ingredients.[employer] Supplement website    [employer] [phone]     COQNOL™ 100MG  Statin medications can deplete an essential nutrient called Coenzyme Q 10.  Coenzyme Q10 (CoQ10) is a nutrient that performs important functions throughout the body. It is particularly critical for the health of hardworking organs like the heart, liver, pancreas, and kidneys. The mitochondria--our body’s microscopic“ power plants”--require CoQ10 to create energy. Our bodies naturally produce CoQ10, but certain conditions and medications increase our need for higher levels than our bodies can provide.

## 2020-11-20 NOTE — LETTER
2020     Sami Lomax, DO  [address]  [address]  [facility] [place] [address]    Patient: Hilda Lomax  YOB: 1955  Date of Visit: 2020      Dear Dr. Lomax:    Thank you for referring Hilda Lomax to me for evaluation. Below are my notes for this consultation.    If you have questions, please do not hesitate to call me. I look forward to following your patient along with you.         Sincerely,        Nnamdi Gustafson MD        CC: No Recipients  Nnamdi Gustafson MD  2020 12:09 PM  Sign when Signing Visit    [facility]    2020      Re:  HILDA LOMAX  :  1955    Dear Sami:    It was my pleasure to speak with your patient, Hilda Lomax, in the [facility] today.      As you know, he was referred for follow-up therapy concerning his subclinical coronary artery disease and dyslipidemia.    The patient has been through a previous cardiac workup by Dr. Santo Tucker at [place].  A coronary calcium score was obtained in  which revealed Agatston score 174 with significant plaquing of his LAD.  This prompted a stress study which was equivocal for ischemia.  This was followed by a diagnostic left heart catheterization in 2016 which demonstrated moderate nonobstructive Mid LAD disease with negative FFR.  Medical management was indicated.    The patient has been on a prevention program over the past several years.  He is on Lipitor 10 mg daily.  We are awaiting his lipid panel from today.     On his advanced lipid panel in 2019 his bioinflammatory markers including HSCRP and LPPLA-2 were normal.  His endothelial function was normal.  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  He has a prothrombin mutation which increases his risk for venous thrombosis.  His homocystine level is elevated at 11.  There was no evidence of insulin resistance.  His ferritin levels are low.   His omega-3 index was10.9.    We reviewed his medications and supplements in detail and are making no substantial changes today.  He will continue on with prevention program as outlined above.    PAST MEDICAL HISTORY:    Past Medical History:   Diagnosis Date   • Abnormal ECG     RBBB   • Allergic rhinitis 10/1/2019   • Anxiety    • Coronary artery disease     Coronary artery disease, cardiac catheterization in November 2016, moderate stenosis involving the mid LAD, FFR negative   • DDD (degenerative disc disease), lumbar 10/1/2019   • Dyslipidemia 4/20/2018   • Essential hypertension 4/20/2018   • GERD (gastroesophageal reflux disease)    • Hyperhomocysteinemia (CMS/HCC)    • Hyperhomocystinemia (CMS/HCC) 4/20/2018   • Hypertension    • Idiopathic peripheral neuropathy 10/26/2020   • Lipid disorder    • Obstructive sleep apnea    • Prothrombin mutation (CMS/HCC) 4/20/2018   • Varicose veins of legs 6/8/2017    Last Assessment & Plan:  Right lower leg VV  Ref Dr Wendy Quiroz at [facility]   • Vitamin D deficiency 1/10/2020       PAST SURGICAL HISTORY:  Retinal tear, prostate biopsy, hernia repair.    CURRENT MEDICATIONS:   Current Outpatient Medications   Medication Instructions   • alpha lipoic acid 200 mg, oral, Daily   • aspirin (ASPIR-81) 81 mg, oral, Daily   • atorvastatin (LIPITOR) 10 mg, oral, Daily   • cholecalciferol, vitamin D3, (VITAMIN D3) 2,000 unit tablet oral, Daily   • coenzyme Q10 (CO Q-10) 200 mg, oral, Daily   • diazePAM (VALIUM) 2 mg, oral, As needed   • Lactobac no.41-Bifidobact no.7 (PROBIOTIC-10) 70 mg (3 billion cell) capsule oral, Daily   • losartan (COZAAR) 50 mg, oral, Daily   • magnesium 250 mg, oral, Daily   • MULTIVIT WITH MINERALS/LUTEIN (MULTIVITAMIN 50 PLUS ORAL) oral, Daily   • omega-3 fatty acids 1,000 mg capsule oral, Daily, Two capsules daily    • pantoprazole (PROTONIX) 40 mg, oral, Daily   • resveratrol 250 mg, oral, Daily   • zolpidem (AMBIEN) 5 mg, oral, Daily        SUPPLEMENTS:  Multivitamin, omega-3 fish oil, vitamin-D, Reservitol, methyl-B essentials, probiotic, coenzyme-Q10, lipoic acid, calcium.  We reviewed his supplements in detail today.    ALLERGIES:  No known drug allergies.    FAMILY HISTORY:  Mother with hypertension, congestive heart failure.  Father with myocardial infarction, hypertension.    SOCIAL HISTORY:  He is .  His wife's name is Lakeshia Presley.  He has one child.  He is an , self-employed.  He does not smoke.  Rare alcohol.  Regular exercise.    REVIEW OF SYSTEMS:  The patient has snoring, uses nasal CPAP, so he does have sleep apnea, GERD, reflux.  He has been on proton pump inhibitors for 15 years.  The remainder of his 12 point review of systems is negative.    PHYSICAL EXAMINATION:   Vitals:    11/20/20 1129   BP: 130/80   Pulse: 64   Resp: 16   SpO2: 99%        Wt Readings from Last 3 Encounters:   11/20/20 72.6 kg (160 lb)   10/21/20 74.6 kg (164 lb 6.4 oz)   01/10/20 73.1 kg (161 lb 3.2 oz)       HEENT:  Unremarkable.  Neck:  Supple, no JVD.  Lungs:  Clear to auscultation and percussion.  Cardiac:  Regular rate and rhythm.  Abdomen:  Soft, bowel sounds present, no organomegaly.  Extremities:  No edema, pulses intact.  Skin:  Warm and dry.  Neuro:  Alert and oriented X 3.    LABORATORY DATA:      EKG:      Coronary Calcium score 2016 at [place]: 174 predominantly involving the mid LAD.  Valvular     Cardiac catheterization 2016:  Mild to moderate non-obstructive LAD disease. FFR LAD negative.  Minor luminal irregularities LCX and RCA.  Normal LV function EF 60%.    Stress echo November 2019:  · Stress echocardiogram reveals discordant results with abnormal ecg but normal wall motion.  · Response to Stress: A horizontal ST segment depression of 1 mm was noted during stress in the V5 and V6 leads, beginning at 11 minutes 50 seconds of stress, and returning to baseline by 1 min of recovery.  · Post-stress  "echocardiogram does not meet criteria for ischemia. All ventricular walls become hyperdynamic and the ejection fraction improves.  ·   [facility] Advanced lipid panel April 2019 in \"media\":  Total cholesterol 153 LDL 72 HDL 77 trig, 30 APO B 72 LP(a) 64  Inflammation panel: Normal  Endothelial function panel: Normal  Diabetes panel: Normal  Metabolic panel: Normal.  Vitamin D 66 homocystine 11  Omega-3 index 10.6  Sterol panel: Normal  Renal function panel: GFR 74    Basic Lipid Panels:  Component      Latest Ref Rng & Units 12/11/2017 1/3/2019 10/31/2019 1/27/2020   VLDL Cholesterol Timmy      5 - 40 mg/dL  7  6   HDL      >39 mg/dL 74 76 75 82   Triglycerides      0 - 149 mg/dL 24 (L) 36 37 30   LDL Calculated      0 - 99 mg/dL 74 58 76 71   Cholesterol      100 - 199 mg/dL 153 141 162 159       IMPRESSIONS/RECOMMENDATIONS:  1. Coronary artery disease.  The patient has had prior cardiac catheterization which demonstrated nonobstructive LAD coronary artery disease.  His recent stress echo November 2019 demonstrated normal wall motion.  He did have borderline EKG changes at peak exercise. The patient needs an aggressive risk factor modification program.  He will continue baby aspirin.   2. Dyslipidemia.  Continue Lipitor.  He has reached goal.  3. 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  The patient is at increased risk for progressive atherosclerosis.  4. Prothrombin mutation.  The patient is at increased risk of DVT.  5. Hyperhomocystinemia.  The patient should continue with B-complex and L5 methyl folate.  6. Obstructive sleep apnea.  The patient will continue nasal CPAP.  7. Borderline hypertension.  Patient will monitor his blood pressures at home.  Our goal is a blood pressure less than 130/80.  He continues on losartan 50 mg daily.  8. Anxiety disorder.  9. GERD and irritable bowel syndrome.  Stable    Summary: Dandy is demonstrating cardiovascular stability. .  We reviewed his prevention program in detail.  We are making no " substantial changes today.  We are waiting his a repeat lipid panel which was performed today.    This was a 30-minute patient encounter with greater than 50% time spent in care coordination and counseling.      Sincerely,    Nnamdi Gustafson MD  11/20/2020

## 2020-12-14 ENCOUNTER — TELEPHONE (OUTPATIENT)
Dept: SCHEDULING | Facility: CLINIC | Age: 65
End: 2020-12-14

## 2020-12-14 NOTE — TELEPHONE ENCOUNTER
Test Results     Name of caller: Dandy    Relationship to patient: Self    Name of patient: Dandy Dami    Name of physician: Nnamdi Gustafson MD    Type of test: [facility] diagnostic lab order    Best contact number: [phone]     Pt is requesting for a copy of the results to be mailed

## 2020-12-14 NOTE — TELEPHONE ENCOUNTER
Usually these are not given out to patient before visit we have not even received the copy from the company to scan in but I went on the portal and printed them out

## 2021-01-07 RX ORDER — ESCITALOPRAM OXALATE 10 MG/1
10 TABLET ORAL DAILY
COMMUNITY
End: 2021-01-07 | Stop reason: SDUPTHER

## 2021-01-07 NOTE — TELEPHONE ENCOUNTER
Patient has a 4/21 appt with you for a 6 month fuv. Patient is asking if he needs to have any labs done prior to that appointment?

## 2021-01-07 NOTE — TELEPHONE ENCOUNTER
Medicine Refill Request    Last Office Visit: 10/21/2020  Last Telemedicine Visit: Visit date not found    Next Office Visit: 4/21/2021  Next Telemedicine Visit: Visit date not found         Current Outpatient Medications:   •  alpha lipoic acid 200 mg capsule, Take 200 mg by mouth once daily., Disp: , Rfl:   •  aspirin (ASPIR-81) 81 mg enteric coated tablet, Take 81 mg by mouth once daily., Disp: , Rfl:   •  atorvastatin (LIPITOR) 10 mg tablet, Take 1 tablet (10 mg total) by mouth daily., Disp: 90 tablet, Rfl: 3  •  cholecalciferol, vitamin D3, (VITAMIN D3) 2,000 unit tablet, Take by mouth daily., Disp: , Rfl:   •  coenzyme Q10 (CO Q-10) 200 mg capsule, Take 200 mg by mouth once daily., Disp: , Rfl:   •  diazePAM (VALIUM) 2 mg tablet, Take 2 mg by mouth as needed.  , Disp: , Rfl:   •  Lactobac no.41-Bifidobact no.7 (PROBIOTIC-10) 70 mg (3 billion cell) capsule, Take by mouth daily., Disp: , Rfl:   •  losartan (COZAAR) 50 mg tablet, Take 1 tablet (50 mg total) by mouth daily., Disp: 90 tablet, Rfl: 3  •  magnesium 250 mg tablet, Take 250 mg by mouth daily., Disp: , Rfl:   •  MULTIVIT WITH MINERALS/LUTEIN (MULTIVITAMIN 50 PLUS ORAL), Take by mouth once daily., Disp: , Rfl:   •  omega-3 fatty acids 1,000 mg capsule, Take by mouth once daily. Two capsules daily, Disp: , Rfl:   •  pantoprazole (PROTONIX) 40 mg EC tablet, Take 40 mg by mouth daily.  , Disp: , Rfl:   •  resveratrol 250 mg capsule, Take 250 mg by mouth daily.  , Disp: , Rfl:   •  zolpidem (AMBIEN) 5 mg tablet, Take 1 tablet (5 mg total) by mouth daily. (Patient taking differently: Take 5 mg by mouth nightly as needed.  ), Disp: 30 tablet, Rfl: 0      BP Readings from Last 3 Encounters:   11/20/20 130/80   10/21/20 120/70   01/10/20 126/70       Recent Lab results:  Lab Results   Component Value Date    CHOL 159 01/27/2020   ,   Lab Results   Component Value Date    HDL 82 01/27/2020   ,   Lab Results   Component Value Date    LDLCALC 71 01/27/2020   ,    Lab Results   Component Value Date    TRIG 30 01/27/2020        Lab Results   Component Value Date    GLUCOSE 100 (H) 01/27/2020   ,   Lab Results   Component Value Date    HGBA1C 5.4 12/12/2016         Lab Results   Component Value Date    CREATININE 1.04 01/27/2020       Lab Results   Component Value Date    TSH 2.930 01/27/2020

## 2021-01-08 RX ORDER — ESCITALOPRAM OXALATE 10 MG/1
10 TABLET ORAL DAILY
Qty: 90 TABLET | Refills: 1 | Status: SHIPPED | OUTPATIENT
Start: 2021-01-08 | End: 2021-04-21

## 2021-01-11 NOTE — TELEPHONE ENCOUNTER
Let patient know that it would probably be best to discuss at the time of exam--try to determine if anything lab-wise is missing--has a number of physicians that order lab throughout the year--unless he has a specific concern, thanks

## 2021-01-12 ENCOUNTER — TELEPHONE (OUTPATIENT)
Dept: PRIMARY CARE | Facility: CLINIC | Age: 66
End: 2021-01-12

## 2021-01-12 ENCOUNTER — TELEMEDICINE (OUTPATIENT)
Dept: PRIMARY CARE | Facility: CLINIC | Age: 66
End: 2021-01-12
Payer: MEDICARE

## 2021-01-12 DIAGNOSIS — R19.7 DIARRHEA, UNSPECIFIED TYPE: Primary | ICD-10-CM

## 2021-01-12 DIAGNOSIS — R53.1 WEAKNESS: ICD-10-CM

## 2021-01-12 DIAGNOSIS — K52.9 GASTROENTERITIS: ICD-10-CM

## 2021-01-12 DIAGNOSIS — R10.9 ABDOMINAL CRAMPING: ICD-10-CM

## 2021-01-12 DIAGNOSIS — R19.7 ACUTE DIARRHEA: Primary | ICD-10-CM

## 2021-01-12 DIAGNOSIS — M79.10 MYALGIA: ICD-10-CM

## 2021-01-12 PROCEDURE — 99442 PR PHYS/QHP TELEPHONE EVALUATION 11-20 MIN: CPT | Mod: 95 | Performed by: FAMILY MEDICINE

## 2021-01-12 ASSESSMENT — ENCOUNTER SYMPTOMS
SHORTNESS OF BREATH: 0
ROS GI COMMENTS: CRAMPING
DIFFICULTY URINATING: 0
DIZZINESS: 0
WEAKNESS: 0
COUGH: 0
PSYCHIATRIC NEGATIVE: 1
SORE THROAT: 0
NAUSEA: 1
ARTHRALGIAS: 0
FATIGUE: 1
DIARRHEA: 1
MYALGIAS: 0
BLOOD IN STOOL: 0
CARDIOVASCULAR NEGATIVE: 1
EYES NEGATIVE: 1
BACK PAIN: 1
NEUROLOGICAL NEGATIVE: 1
APPETITE CHANGE: 0
CHILLS: 0
FEVER: 0
UNEXPECTED WEIGHT CHANGE: 0
SINUS PAIN: 0
ALLERGIC/IMMUNOLOGIC NEGATIVE: 1
RESPIRATORY NEGATIVE: 1
VOMITING: 0
HEMATOLOGIC/LYMPHATIC NEGATIVE: 1
ABDOMINAL PAIN: 1

## 2021-01-12 NOTE — PROGRESS NOTES
Verification of Patient Location:  The patient affirms they are currently located in the following state: [place]    Request for Consent:    Audio Only Encounter   You and I are about to have a telemedicine check-in or visit. This is allowed because you have requested it. This telemedicine visit will be billed to your health insurance or you, if you are self-insured. You understand you will be responsible for any copayments or coinsurances that apply to your telemedicine visit. Before starting our telemedicine visit, I am required to get your consent for this virtual check-in or visit by telemedicine. Do you consent?    Patient Response to Request for Consent:  Yes      Visit Documentation:  Subjective     Patient ID: Dandy Lomax is a 65 y.o. male.  1955      2-3d hx of loose/urgent stools, abd cramping, ?exposure recent travel [place], works environmental inspections (good hygiene, ?hands), eats roughage, has been tired/weak, has BM up to q 2hrs, gas; aches/lbp like with typical virus; not sure if had incompletely cooked burger; neg vomiting, hasnt taken temp; sees GI--had colo/MRI within past yr      The following have been reviewed and updated as appropriate in this visit:  Tobacco  Allergies  Meds  Problems  Med Hx  Surg Hx  Fam Hx       Review of Systems   Constitutional: Positive for fatigue. Negative for appetite change, chills, fever and unexpected weight change.   HENT: Negative.  Negative for congestion, sinus pain and sore throat.    Eyes: Negative.         Neg conj sx, alt taste smell   Respiratory: Negative.  Negative for cough and shortness of breath.    Cardiovascular: Negative.  Negative for chest pain.   Gastrointestinal: Positive for abdominal pain, diarrhea and nausea. Negative for blood in stool and vomiting.        Cramping   Genitourinary: Negative.  Negative for difficulty urinating.   Musculoskeletal: Positive for back pain. Negative for arthralgias and myalgias.   Skin:  Negative.  Negative for rash.        Recent ringworm   Allergic/Immunologic: Negative.    Neurological: Negative.  Negative for dizziness, syncope and weakness.   Hematological: Negative.    Psychiatric/Behavioral: Negative.          Assessment/Plan   Diagnoses and all orders for this visit:    Acute diarrhea (Primary)  Assessment & Plan:  Fluids, HILARIO, decrease roughage--may need stool cult if diarrhea persists--will send for covid testing--reviewed taking temp/monitoring/isolate/quarantine      Abdominal cramping  Assessment & Plan:  Pt may need to d/w his GI--also reviewed UC/ER if sx persist chg worsen      Gastroenteritis    Myalgia  Assessment & Plan:  Tyl, supportive      Weakness      Time Spent:  I spent 15 minutes on this date of service performing the following activities:

## 2021-01-12 NOTE — ASSESSMENT & PLAN NOTE
Fluids, HILARIO, decrease roughage--may need stool cult if diarrhea persists--will send for covid testing--reviewed taking temp/monitoring/isolate/quarantine

## 2021-01-13 ENCOUNTER — TELEPHONE (OUTPATIENT)
Dept: INFECTIOUS DISEASES | Facility: HOSPITAL | Age: 66
End: 2021-01-13

## 2021-01-13 ENCOUNTER — TELEPHONE (OUTPATIENT)
Dept: PRIMARY CARE | Facility: CLINIC | Age: 66
End: 2021-01-13

## 2021-01-13 NOTE — TELEPHONE ENCOUNTER
Spoke with patient, he states that we are [place] from his home, and he is in the process of trying to locate somewhere else to go. Patient states he will call back, if he wants to be scheduled.

## 2021-01-13 NOTE — TELEPHONE ENCOUNTER
The order is in, I am not sure if it goes to patient's my chart. I tried to forward it to the my chart and am unable. If he gets a fax number, we can fax it. But if [facility] is on my chart they should have access to the order.

## 2021-01-13 NOTE — TELEPHONE ENCOUNTER
Pt stated that he wants to go to [place] to get covid testing, [place] is too far away from where he lives. Can you put in an order so he can get it from his Mychart and he will schedule to get tested today.

## 2021-01-13 NOTE — TELEPHONE ENCOUNTER
Please schedule this patient for Covid 19 testing.  I have updated the patient's demographic information with the patient's contact information for scheduling.    Patient symptoms:Nausea/diarrhea and Fatigue    Provider is: Provider Select: [facility] Provider  (Independent providers with Epic access SHOULD NOT put orders in Epic, results will not route)    Ordering provider (First - Last): Sami Lomax DO  Provider Best Callback # for NSQ: [phone]    This patient is a [employer] Employee: YES/NO/NA: No  If yes: notify Employee Exposure line and advise ordering provider that Employee's also need to call that team.    If an [facility] provider, advised order needs to be in Epic.    If an independent provider, they are directed to fax the order to [phone]  (Fax should include: patient name, date of birth, Covid 19 [facility] order, ICD-10 for symptoms, and either [facility] account number or fax number for [facility] to send results)    Independent provider results will arrive via [facility]   [facility] provider results will route via Epic

## 2021-01-14 ENCOUNTER — TELEPHONE (OUTPATIENT)
Dept: PRIMARY CARE | Facility: CLINIC | Age: 66
End: 2021-01-14

## 2021-01-14 NOTE — TELEPHONE ENCOUNTER
HILDAI... no fever, no pneumonia. Neg for covid. Patient reports he is feeling much better today. Result of covid test is in his chart  Care everywhere>lab results>SARS-CoV

## 2021-01-27 ENCOUNTER — OFFICE VISIT (OUTPATIENT)
Dept: PRIMARY CARE | Facility: CLINIC | Age: 66
End: 2021-01-27
Payer: MEDICARE

## 2021-01-27 VITALS
BODY MASS INDEX: 23.07 KG/M2 | HEART RATE: 66 BPM | DIASTOLIC BLOOD PRESSURE: 68 MMHG | RESPIRATION RATE: 16 BRPM | HEIGHT: 71 IN | OXYGEN SATURATION: 99 % | SYSTOLIC BLOOD PRESSURE: 130 MMHG | WEIGHT: 164.8 LBS

## 2021-01-27 DIAGNOSIS — M81.0 OSTEOPOROSIS, UNSPECIFIED OSTEOPOROSIS TYPE, UNSPECIFIED PATHOLOGICAL FRACTURE PRESENCE: ICD-10-CM

## 2021-01-27 DIAGNOSIS — E55.9 VITAMIN D DEFICIENCY: ICD-10-CM

## 2021-01-27 DIAGNOSIS — L85.3 DRY SKIN DERMATITIS: ICD-10-CM

## 2021-01-27 DIAGNOSIS — Z85.828 HX OF NONMELANOMA SKIN CANCER: ICD-10-CM

## 2021-01-27 DIAGNOSIS — Z78.9 ADVANCE DIRECTIVE ON FILE: ICD-10-CM

## 2021-01-27 DIAGNOSIS — F41.9 ANXIETY DISORDER, UNSPECIFIED TYPE: ICD-10-CM

## 2021-01-27 DIAGNOSIS — M51.369 DDD (DEGENERATIVE DISC DISEASE), LUMBAR: ICD-10-CM

## 2021-01-27 DIAGNOSIS — Z00.00 MEDICARE ANNUAL WELLNESS VISIT, SUBSEQUENT: Primary | ICD-10-CM

## 2021-01-27 DIAGNOSIS — N39.41 URGE INCONTINENCE: ICD-10-CM

## 2021-01-27 DIAGNOSIS — E78.5 DYSLIPIDEMIA: ICD-10-CM

## 2021-01-27 DIAGNOSIS — Z00.00 ENCOUNTER FOR PREVENTIVE HEALTH EXAMINATION: ICD-10-CM

## 2021-01-27 DIAGNOSIS — N40.0 BENIGN PROSTATIC HYPERPLASIA, UNSPECIFIED WHETHER LOWER URINARY TRACT SYMPTOMS PRESENT: ICD-10-CM

## 2021-01-27 DIAGNOSIS — Z13.6 SCREENING FOR AAA (ABDOMINAL AORTIC ANEURYSM): ICD-10-CM

## 2021-01-27 DIAGNOSIS — I25.10 CORONARY ARTERY DISEASE INVOLVING NATIVE CORONARY ARTERY OF NATIVE HEART WITHOUT ANGINA PECTORIS: ICD-10-CM

## 2021-01-27 DIAGNOSIS — G47.00 INSOMNIA, UNSPECIFIED TYPE: ICD-10-CM

## 2021-01-27 DIAGNOSIS — J45.909 ASTHMA, UNSPECIFIED ASTHMA SEVERITY, UNSPECIFIED WHETHER COMPLICATED, UNSPECIFIED WHETHER PERSISTENT: ICD-10-CM

## 2021-01-27 DIAGNOSIS — K21.9 GASTROESOPHAGEAL REFLUX DISEASE, UNSPECIFIED WHETHER ESOPHAGITIS PRESENT: ICD-10-CM

## 2021-01-27 DIAGNOSIS — I10 ESSENTIAL HYPERTENSION: ICD-10-CM

## 2021-01-27 DIAGNOSIS — G47.33 OSA (OBSTRUCTIVE SLEEP APNEA): ICD-10-CM

## 2021-01-27 PROCEDURE — G0439 PPPS, SUBSEQ VISIT: HCPCS | Performed by: FAMILY MEDICINE

## 2021-01-27 ASSESSMENT — PATIENT HEALTH QUESTIONNAIRE - PHQ9: SUM OF ALL RESPONSES TO PHQ9 QUESTIONS 1 & 2: 0

## 2021-01-27 ASSESSMENT — MINI COG
TOTAL SCORE: 5
COMPLETED: YES

## 2021-01-27 ASSESSMENT — ENCOUNTER SYMPTOMS
GASTROINTESTINAL NEGATIVE: 1
MUSCULOSKELETAL NEGATIVE: 1
CHILLS: 0
ABDOMINAL PAIN: 0
UNEXPECTED WEIGHT CHANGE: 0
CARDIOVASCULAR NEGATIVE: 1
PSYCHIATRIC NEGATIVE: 1
EYES NEGATIVE: 1
CONSTITUTIONAL NEGATIVE: 1
DIZZINESS: 0
ARTHRALGIAS: 0
DIARRHEA: 0
FEVER: 0
ALLERGIC/IMMUNOLOGIC NEGATIVE: 1
ENDOCRINE NEGATIVE: 1
COUGH: 0
NEUROLOGICAL NEGATIVE: 1
DIFFICULTY URINATING: 0
SHORTNESS OF BREATH: 0
MYALGIAS: 0
APPETITE CHANGE: 0
HEMATOLOGIC/LYMPHATIC NEGATIVE: 1
WEAKNESS: 0
BLOOD IN STOOL: 0
RESPIRATORY NEGATIVE: 1

## 2021-01-27 NOTE — PROGRESS NOTES
Subjective      Patient ID: Dandy Lomax is a 65 y.o. male.    For  wellness, no spec complts--updates--recent sick visit tele, covid neg, sx resolved; pulm-asthma/copd, poss gerd--no f/u planned (had both pneumos)--doesn't want shingrix yet; covid vax pndg this wkd; has gi [facility] (had mri, colo nl-egd q2yr); cardio q6mo/no chgs; [facility] at [place]--osteoporosis prn; dr santillan neurosurg discussing fusion/mri stable, but sx better presently; uro aug, stable, urge incont--pt thinks may be related to back; taking lexapro 5 --'takes edge off, improved sleep, a lot on mind'; ent re salivary stones, [facility], tele, no chgs; mery/sleep, stable on cpap, no current f/u; re adv dir--has poa, living will; needs lab; has treadmill, dumbells    The following have been reviewed and updated as appropriate in this visit:  Tobacco  Allergies  Meds  Problems  Med Hx  Surg Hx  Fam Hx       Patient Active Problem List   Diagnosis   • Coronary artery disease   • Dyslipidemia   • Prothrombin mutation (CMS/HCC)   • Hyperhomocystinemia (CMS/HCC)   • MERY (obstructive sleep apnea)   • Essential hypertension   • Anxiety disorder   • GERD (gastroesophageal reflux disease)   • Varicose veins of legs   • Trigger ring finger of right hand   • Osteoporosis   • Knee pain, bilateral   • Insomnia   • Cyclic neutropenia (CMS/HCC)   • Chest tightness   • Cough   • Allergic rhinitis   • Dyspnea   • Bruising   • DDD (degenerative disc disease), lumbar   • Low testosterone   • Constipation   • Lumbar stenosis without neurogenic claudication   • Lumbar radiculopathy   • Lumbar facet arthropathy   • Medicare annual wellness visit, initial   • Vitamin D deficiency   • Asthma   • History of prostatitis   • Dermatitis   • Immunization due   • S/P epidural steroid injection   • Salivary stones   • Urge incontinence   • Nocturia   • Idiopathic peripheral neuropathy   • Acute diarrhea   • Abdominal cramping   • Myalgia   • Weakness   • Gastroenteritis   •  Encounter for preventive health examination   • Benign prostatic hyperplasia   • Screening for AAA (abdominal aortic aneurysm)   • Medicare annual wellness visit, subsequent   • Dry skin dermatitis   • Hx of nonmelanoma skin cancer   • Advance directive on file     Past Surgical History:   Procedure Laterality Date   • CATARACT EXTRACTION     • COLONOSCOPY     • ESOPHAGOGASTRODUODENOSCOPY     • LUMBAR LAMINECTOMY       Family History   Problem Relation Age of Onset   • Hypertension Biological Mother    • Heart failure Biological Mother    • Uterine cancer Biological Mother    • Heart attack Biological Father    • Hypertension Biological Father    • COPD Biological Father    • No Known Problems Biological Sister      Social History     Socioeconomic History   • Marital status:      Spouse name: Not on file   • Number of children: 1   • Years of education: Not on file   • Highest education level: Not on file   Occupational History   • Occupation:    Social Needs   • Financial resource strain: Not on file   • Food insecurity     Worry: Not on file     Inability: Not on file   • Transportation needs     Medical: Not on file     Non-medical: Not on file   Tobacco Use   • Smoking status: Never Smoker   • Smokeless tobacco: Never Used   Substance and Sexual Activity   • Alcohol use: Yes     Comment: Rare alcohol   • Drug use: No   • Sexual activity: Not on file   Lifestyle   • Physical activity     Days per week: Not on file     Minutes per session: Not on file   • Stress: Not on file   Relationships   • Social connections     Talks on phone: Not on file     Gets together: Not on file     Attends [employer] service: Not on file     Active member of club or organization: Not on file     Attends meetings of clubs or organizations: Not on file     Relationship status: Not on file   • Intimate partner violence     Fear of current or ex partner: Not on file     Emotionally abused: Not on file      Physically abused: Not on file     Forced sexual activity: Not on file   Other Topics Concern   • Not on file   Social History Narrative    One son, 10 y/o.        Review of Systems   Constitutional: Negative.  Negative for appetite change, chills, fever and unexpected weight change.   HENT: Negative.    Eyes: Negative.         Sees optomety annually, ophth (had cataracts) prn   Respiratory: Negative.  Negative for cough and shortness of breath.    Cardiovascular: Negative.  Negative for chest pain.   Gastrointestinal: Negative.  Negative for abdominal pain, blood in stool and diarrhea.        Occ constip, ibs   Endocrine: Negative.    Genitourinary: Negative.  Negative for difficulty urinating.   Musculoskeletal: Negative.  Negative for arthralgias and myalgias.   Skin: Negative.  Negative for rash.        Dry skin--steroid cream derm--tibias--hx non melanoma skin ca   Allergic/Immunologic: Negative.    Neurological: Negative.  Negative for dizziness, syncope and weakness.   Hematological: Negative.    Psychiatric/Behavioral: Negative.        No Known Allergies  Current Outpatient Medications   Medication Sig Dispense Refill   • alpha lipoic acid 200 mg capsule Take 200 mg by mouth once daily.     • aspirin (ASPIR-81) 81 mg enteric coated tablet Take 81 mg by mouth once daily.     • atorvastatin (LIPITOR) 10 mg tablet Take 1 tablet (10 mg total) by mouth daily. 90 tablet 3   • cholecalciferol, vitamin D3, (VITAMIN D3) 2,000 unit tablet Take by mouth daily.     • coenzyme Q10 (CO Q-10) 200 mg capsule Take 200 mg by mouth once daily.     • escitalopram (LEXAPRO) 10 mg tablet Take 1 tablet (10 mg total) by mouth daily. 90 tablet 1   • Lactobac no.41-Bifidobact no.7 (PROBIOTIC-10) 70 mg (3 billion cell) capsule Take by mouth daily.     • losartan (COZAAR) 50 mg tablet Take 1 tablet (50 mg total) by mouth daily. 90 tablet 3   • magnesium 250 mg tablet Take 250 mg by mouth daily.     • MULTIVIT WITH MINERALS/LUTEIN  "(MULTIVITAMIN 50 PLUS ORAL) Take by mouth once daily.     • omega-3 fatty acids 1,000 mg capsule Take by mouth once daily. Two capsules daily     • pantoprazole (PROTONIX) 40 mg EC tablet Take 40 mg by mouth daily.       • resveratrol 250 mg capsule Take 250 mg by mouth daily.       • zolpidem (AMBIEN) 5 mg tablet Take 1 tablet (5 mg total) by mouth daily. (Patient taking differently: Take 5 mg by mouth nightly as needed.  ) 30 tablet 0     No current facility-administered medications for this visit.        Objective   Vitals:    01/27/21 1120   BP: 130/68   Pulse: 66   Resp: 16   SpO2: 99%   Weight: 74.8 kg (164 lb 12.8 oz)   Height: 1.803 m (5' 11\")       Physical Exam  Vitals signs reviewed.   Constitutional:       General: He is not in acute distress.     Appearance: Normal appearance. He is well-developed. He is not ill-appearing.      Comments: Nice man, usual state of health   HENT:      Head: Normocephalic and atraumatic.      Right Ear: Tympanic membrane, ear canal and external ear normal.      Left Ear: Tympanic membrane, ear canal and external ear normal.      Nose: Nose normal.   Eyes:      Extraocular Movements: Extraocular movements intact.      Conjunctiva/sclera: Conjunctivae normal.      Pupils: Pupils are equal, round, and reactive to light.   Neck:      Musculoskeletal: Normal range of motion and neck supple.      Thyroid: No thyromegaly.      Vascular: No carotid bruit.   Cardiovascular:      Rate and Rhythm: Normal rate and regular rhythm.      Heart sounds: Normal heart sounds. No murmur.   Pulmonary:      Effort: Pulmonary effort is normal.      Breath sounds: Normal breath sounds.   Abdominal:      General: Bowel sounds are normal.      Palpations: Abdomen is soft.      Tenderness: There is no abdominal tenderness.   Musculoskeletal:      Comments: Sitting reasonably comfortably, cross legged   Lymphadenopathy:      Cervical: No cervical adenopathy.   Skin:     General: Skin is warm and dry. "      Comments: tibs--?eczema   Neurological:      Mental Status: He is alert and oriented to person, place, and time.   Psychiatric:         Mood and Affect: Mood normal.         Behavior: Behavior normal.         Thought Content: Thought content normal.         Judgment: Judgment normal.      Comments: decr anx         Assessment/Plan   Diagnoses and all orders for this visit:    Medicare annual wellness visit, subsequent (Primary)    Advance directive on file    Encounter for preventive health examination  Assessment & Plan:  Reviewed prev health, vax (shingrix tdap when able), lab    Orders:  -     CBC and Differential; Future  -     Comprehensive metabolic panel; Future  -     Urinalysis with Reflex Culture (ED and Outpatient only); Future  -     TSH w reflex FT4; Future  -     PSA; Future  -     Vitamin D 25 hydroxy; Future    Coronary artery disease involving native coronary artery of native heart without angina pectoris  Assessment & Plan:  Cont per dr jane, [facility]; check lab      Essential hypertension  Assessment & Plan:  stable      Dyslipidemia  Assessment & Plan:  Statin, fish oil, coq10, etc per cardio      Screening for AAA (abdominal aortic aneurysm)  -     ULTRASOUND AAA SCREENING; Future    Benign prostatic hyperplasia, unspecified whether lower urinary tract symptoms present  Assessment & Plan:  Cont per uro, psa q 6mo per pt    Orders:  -     PSA; Future    Urge incontinence  Assessment & Plan:  Improved currently re l-spine      DDD (degenerative disc disease), lumbar  Assessment & Plan:  Poss fusion per neurosurg      Osteoporosis, unspecified osteoporosis type, unspecified pathological fracture presence  Assessment & Plan:  dxa update prn      Vitamin D deficiency  Assessment & Plan:  Replenish prn, role in bone health    Orders:  -     Vitamin D 25 hydroxy; Future    Asthma, unspecified asthma severity, unspecified whether complicated, unspecified whether persistent  Assessment &  Plan:  Reviewed pulm, imaging, occ exposure--consider LDCT      Gastroesophageal reflux disease, unspecified whether esophagitis present  Assessment & Plan:  Diet mod/PPI; continue per gi      MERY (obstructive sleep apnea)  Assessment & Plan:  Cont cpap, stable      Anxiety disorder, unspecified type  Assessment & Plan:  Stable on lexapro 5      Insomnia, unspecified type  Assessment & Plan:  Stable on ambien      Dry skin dermatitis  Assessment & Plan:  Cont per derm, topical steroid, emollient      Hx of nonmelanoma skin cancer        Sami Lomax DO    1/28/2021  Subjective     Dandy Lomax is a 65 y.o. male who presents for a subsequent annual wellness visit.     Patient Care Team:  Sami Lomax DO as PCP - General (Family Medicine)  Harman Cr MD as Referring Physician (Urology)    Comprehensive Medical and Social History  Patient Active Problem List   Diagnosis   • Coronary artery disease   • Dyslipidemia   • Prothrombin mutation (CMS/HCC)   • Hyperhomocystinemia (CMS/HCC)   • MERY (obstructive sleep apnea)   • Essential hypertension   • Anxiety disorder   • GERD (gastroesophageal reflux disease)   • Varicose veins of legs   • Trigger ring finger of right hand   • Osteoporosis   • Knee pain, bilateral   • Insomnia   • Cyclic neutropenia (CMS/HCC)   • Chest tightness   • Cough   • Allergic rhinitis   • Dyspnea   • Bruising   • DDD (degenerative disc disease), lumbar   • Low testosterone   • Constipation   • Lumbar stenosis without neurogenic claudication   • Lumbar radiculopathy   • Lumbar facet arthropathy   • Medicare annual wellness visit, initial   • Vitamin D deficiency   • Asthma   • History of prostatitis   • Dermatitis   • Immunization due   • S/P epidural steroid injection   • Salivary stones   • Urge incontinence   • Nocturia   • Idiopathic peripheral neuropathy   • Acute diarrhea   • Abdominal cramping   • Myalgia   • Weakness   • Gastroenteritis   • Encounter for preventive health  examination   • Benign prostatic hyperplasia   • Screening for AAA (abdominal aortic aneurysm)   • Medicare annual wellness visit, subsequent   • Dry skin dermatitis   • Hx of nonmelanoma skin cancer   • Advance directive on file     Past Medical History:   Diagnosis Date   • Abnormal ECG     RBBB   • Allergic rhinitis 10/1/2019   • Anxiety    • Coronary artery disease     Coronary artery disease, cardiac catheterization in November 2016, moderate stenosis involving the mid LAD, FFR negative   • DDD (degenerative disc disease), lumbar 10/1/2019   • Dyslipidemia 4/20/2018   • Essential hypertension 4/20/2018   • GERD (gastroesophageal reflux disease)    • Hyperhomocysteinemia (CMS/HCC)    • Hyperhomocystinemia (CMS/HCC) 4/20/2018   • Hypertension    • Idiopathic peripheral neuropathy 10/26/2020   • Lipid disorder    • Obstructive sleep apnea    • Prothrombin mutation (CMS/HCC) 4/20/2018   • Varicose veins of legs 6/8/2017    Last Assessment & Plan:  Right lower leg VV  Ref Dr Wendy Quiroz at [facility]   • Vitamin D deficiency 1/10/2020     Past Surgical History:   Procedure Laterality Date   • CATARACT EXTRACTION     • COLONOSCOPY     • ESOPHAGOGASTRODUODENOSCOPY     • LUMBAR LAMINECTOMY       No Known Allergies  Current Outpatient Medications   Medication Sig Dispense Refill   • alpha lipoic acid 200 mg capsule Take 200 mg by mouth once daily.     • aspirin (ASPIR-81) 81 mg enteric coated tablet Take 81 mg by mouth once daily.     • atorvastatin (LIPITOR) 10 mg tablet Take 1 tablet (10 mg total) by mouth daily. 90 tablet 3   • cholecalciferol, vitamin D3, (VITAMIN D3) 2,000 unit tablet Take by mouth daily.     • coenzyme Q10 (CO Q-10) 200 mg capsule Take 200 mg by mouth once daily.     • escitalopram (LEXAPRO) 10 mg tablet Take 1 tablet (10 mg total) by mouth daily. 90 tablet 1   • Lactobac no.41-Bifidobact no.7 (PROBIOTIC-10) 70 mg (3 billion cell) capsule Take by mouth daily.     • losartan (COZAAR) 50 mg  "tablet Take 1 tablet (50 mg total) by mouth daily. 90 tablet 3   • magnesium 250 mg tablet Take 250 mg by mouth daily.     • MULTIVIT WITH MINERALS/LUTEIN (MULTIVITAMIN 50 PLUS ORAL) Take by mouth once daily.     • omega-3 fatty acids 1,000 mg capsule Take by mouth once daily. Two capsules daily     • pantoprazole (PROTONIX) 40 mg EC tablet Take 40 mg by mouth daily.       • resveratrol 250 mg capsule Take 250 mg by mouth daily.       • zolpidem (AMBIEN) 5 mg tablet Take 1 tablet (5 mg total) by mouth daily. (Patient taking differently: Take 5 mg by mouth nightly as needed.  ) 30 tablet 0     No current facility-administered medications for this visit.      Social History     Tobacco Use   • Smoking status: Never Smoker   • Smokeless tobacco: Never Used   Substance Use Topics   • Alcohol use: Yes     Comment: Rare alcohol   • Drug use: No     Family History   Problem Relation Age of Onset   • Hypertension Biological Mother    • Heart failure Biological Mother    • Uterine cancer Biological Mother    • Heart attack Biological Father    • Hypertension Biological Father    • COPD Biological Father    • No Known Problems Biological Sister        Objective   Vitals  Vitals:    01/27/21 1120   BP: 130/68   Pulse: 66   Resp: 16   SpO2: 99%   Weight: 74.8 kg (164 lb 12.8 oz)   Height: 1.803 m (5' 11\")     Body mass index is 22.98 kg/m².    Advanced Care Plan  Does patient have advance directive?: Yes       Patient has Advance Directive: Patient has Advance Directive, has not brought in                             PHQ  Will the patient answer the depression questions?: Yes   Over the past 2 weeks, how often have you been bothered by feeling little interest or pleasure in doing things?: Not at all   Over the past 2 weeks, how often have you been bothered by feeling down, depressed, or hopeless?: Not at all   Depression Risk: 0                                             Mini Cog  Completed: Yes  Score: 5  Result: " Negative      Get Up and Go  Result: Pass    STEADI Falls Risk  One or more falls in the last year: No           Has trouble stepping up onto a curb: No   Advised to use a cane or walker to get around safely: No   Often has to rush to the toilet: No   Feels unsteady when walking: No   Has lost some feeling in feet: No   Often feels sad or depressed: No   Steadies self on furniture while walking at home: No   Takes medication that makes him/her feel lightheaded or more tired than usual: No   Worried about falling: No   Takes medicine to sleep or improve mood: No   Needs to push with hands when rising from a chair: No   Falls screen completed: Yes     Hearing and Vision Screening  No exam data present  See HRA for relevant hearing screening response.    Assessment/Plan   Diagnoses and all orders for this visit:    Medicare annual wellness visit, subsequent (Primary)    Advance directive on file    Encounter for preventive health examination  Assessment & Plan:  Reviewed [facility], vax (shingrix tdap when able), lab    Orders:  -     CBC and Differential; Future  -     Comprehensive metabolic panel; Future  -     Urinalysis with Reflex Culture (ED and Outpatient only); Future  -     TSH w reflex FT4; Future  -     PSA; Future  -     Vitamin D 25 hydroxy; Future    Coronary artery disease involving native coronary artery of native heart without angina pectoris  Assessment & Plan:  Cont per dr jane, meds; check lab      Essential hypertension  Assessment & Plan:  stable      Dyslipidemia  Assessment & Plan:  Statin, fish oil, coq10, etc per cardio      Screening for AAA (abdominal aortic aneurysm)  -     ULTRASOUND AAA SCREENING; Future    Benign prostatic hyperplasia, unspecified whether lower urinary tract symptoms present  Assessment & Plan:  Cont per uro, psa q 6mo per pt    Orders:  -     PSA; Future    Urge incontinence  Assessment & Plan:  Improved currently re l-spine      DDD (degenerative disc disease),  lumbar  Assessment & Plan:  Poss fusion per neurosurg      Osteoporosis, unspecified osteoporosis type, unspecified pathological fracture presence  Assessment & Plan:  dxa update prn      Vitamin D deficiency  Assessment & Plan:  Replenish prn, role in bone health    Orders:  -     Vitamin D 25 hydroxy; Future    Asthma, unspecified asthma severity, unspecified whether complicated, unspecified whether persistent  Assessment & Plan:  Reviewed pulm, imaging, occ exposure--consider LDCT      Gastroesophageal reflux disease, unspecified whether esophagitis present  Assessment & Plan:  Diet mod/PPI; continue per gi      MERY (obstructive sleep apnea)  Assessment & Plan:  Cont cpap, stable      Anxiety disorder, unspecified type  Assessment & Plan:  Stable on lexapro 5      Insomnia, unspecified type  Assessment & Plan:  Stable on ambien      Dry skin dermatitis  Assessment & Plan:  Cont per derm, topical steroid, emollient      Hx of nonmelanoma skin cancer      See Patient Instructions (the written plan) which was given to the patient for PPPS and health risk factors with interventions.

## 2021-01-28 PROBLEM — Z00.00 MEDICARE ANNUAL WELLNESS VISIT, SUBSEQUENT: Status: ACTIVE | Noted: 2021-01-28

## 2021-01-28 PROBLEM — Z13.6 SCREENING FOR AAA (ABDOMINAL AORTIC ANEURYSM): Status: ACTIVE | Noted: 2021-01-28

## 2021-01-28 PROBLEM — Z85.828 HX OF NONMELANOMA SKIN CANCER: Status: ACTIVE | Noted: 2021-01-28

## 2021-01-28 PROBLEM — Z78.9 ADVANCE DIRECTIVE ON FILE: Status: ACTIVE | Noted: 2021-01-28

## 2021-01-28 PROBLEM — N40.0 BENIGN PROSTATIC HYPERPLASIA: Status: ACTIVE | Noted: 2021-01-28

## 2021-01-28 PROBLEM — L85.3 DRY SKIN DERMATITIS: Status: ACTIVE | Noted: 2021-01-28

## 2021-01-29 NOTE — PATIENT INSTRUCTIONS
Your Personalized Prevention Plan Services (PPPS)    Preventive Services Checklist (Assumes Average Risk Unless Otherwise Noted):    Health Maintenance Topics with due status: Overdue       Topic Date Due    Varicella Vaccines 07/16/1956    DTaP, Tdap, and Td Vaccines 07/16/1973    Zoster Vaccine 07/16/2004    Medicare Annual Wellness Visit 01/10/2021     Health Maintenance Topics with due status: Not Due       Topic Last Completion Date    Colonoscopy 01/31/2017     Health Maintenance Topics with due status: Completed       Topic Last Completion Date    Hepatitis C Screening 12/12/2016    Influenza Vaccine 10/21/2020    Pneumococcal 11/28/2020    Pneumococcal (65 years and older) 11/28/2020    Annual Falls Risk Screening 01/27/2021     Health Maintenance Topics with due status: Aged Out       Topic Date Due    Meningococcal ACWY Aged Out    HIB Vaccines Aged Out    IPV Vaccines Aged Out    HPV Vaccines Aged Out       You May Be Eligible for These Additional Preventive Services   (Assumes Average Risk Unless Otherwise Noted)  Diabetes Screening Any 1 risk factor: hypertension, dyslipidemia, obesity, high glucose; or Any 2 risk factors: >=64yo, overweight, family history diabetes (covered every 6 months)   Hepatitis C Screening Any 1 risk factor: 1) blood transfusion before 1992,   2) current or past injection drug use (annually for high risk; if born between 5724-[birth year]12, see above for status).   Vaccine: Hepatitis B As necessary if at-risk: hemophilia, ESRD, diabetes, living with individual infected with hep B, healthcare worker with frequent contact with blood/bodily fluids (series covered once)   Sexually Transmitted Diseases (STDs) As necessary chlamydia, gonorrhea, syphilis, hepatitis B (covered annually)  HIV if any 1 risk factor present: 1) <16yo or >64yo and at increased risk or 2) 15-64yo and ask for it (covered annually)   Lung Cancer Screening Low dose chest CT if all 3 risk factors:  1) 55-78yo, 2) smoker or quit within last 15y, 3) >=30 pack years (covered annually).  No results found for this or any previous visit.     Cholesterol Screening No signs or symptoms of cardiovascular disease (screening covered every 5 years).     Prostate Cancer Screening >= 49yo if patient desires test after weighting potential harms and benefits (covered annually)         Health Risk Factors with Personalized Education:  ----------------------------------------------------------------------------------------------------------------------  Stress management:  Understanding Your Stress  · Think about how you know when you’re stressed.  · Think about how your thoughts or behaviors are different when you’re stressed.  · Think about what triggers stress for you.  · Think about how you handle stress, and whether you’re making unhealthy choices in response to stress (like smoking, drinking alcohol or overeating).  Managing Stress  · Take a break from the stressful situation.  · Reduce your stress levels by exercising, talking with family/friends, ensuring adequate sleep.  · Consider meditation or yoga.  · Make sure you plan time to do things you enjoy.  · Let your PCP know if you’re having problems limiting your stress.  ----------------------------------------------------------------------------------------------------------------------  Controlling Your Blood Pressure  · Maintain a normal weight (body mass index between 18.5 and 24.9).  · Eat more fruit, vegetables and low-fat dairy.  · Eat less saturated fat and total fat.  · Lower your sodium (salt) intake.  Try to stay under 1500 mg per day, but if you cannot get your intake to be that low, at least lower it by 1000 mg.  · Stay active.  Try to get at least 90 to 150 minutes of exercise per week.  Try brisk walking, swimming, bicycling or dancing.  · Limit alcohol intake.  When you do consume alcohol, drink no more than 2 drinks per day.  · If you have been  prescribed medication, take it regularly and exactly as prescribed.  Let your PCP know if you have any problems or questions about your medication.  · Check your blood pressure at home or at the store.  Write down your readings and share them with your PCP  ----------------------------------------------------------------------------------------------------------------------  Controlling Your Cholesterol  · Reduce the amount of saturated and trans fat in your diet.  Limit intake of red meat.  Consume only low-fat or non-fat/skim dairy.  Limit fried food.  Cook with vegetable oils.  · Reduce your intake of sugary foods, sugary drinks and alcohol.  · Eat a diet high in fruit, vegetables and whole grains.  · Get protein from fish, poultry and a small portion of nuts.  · Stay active.  Try to get at least 90 to 150 minutes of exercise per week.  Try brisk walking, swimming, bicycling or dancing.  · Maintain a healthy weight by balancing your diet and exercise.  · If you have been prescribed medication, take it regularly and exactly as prescribed. Let your PCP know if you have any problems or questions about your medication.  · It’s important to know your cholesterol numbers.  When recommended by your PCP, get the cholesterol blood test.  ---------------------------------------------------------------------------------------------------------------------   Controlling Your Osteoporosis, Strengthening Your Bones  · Try to get at least 90 to 150 minutes of weight-bearing exercise per week.  · Ensure intake of at least 1200mg of calcium per day.  Eat foods high in calcium like milk and other dairy, green vegetables, fruit, canned fish with soft and edible bones, nuts, calcium-set tofu.  Some foods are calcium-fortified, like bread, cereal, fruit juices and mineral water.  · Help your body make vitamin D by getting 10-15 minutes per day of sunlight.    · Ensure intake of at least 600IU of vitamin D per day.  Eat foods high in  vitamin D like oily fish (salmon, sardines, mackerel) and eggs.  Some foods are fortified with vitamin D, like dairy and cereals.  · Avoid high amounts of caffeine and salt, since they can cause the body to loose calcium.  · Limit alcohol intake, since it is associated with weaker bones and is associated with falls and fractures.  · Limit intake of fizzy drinks.  · If you have been prescribed medication, take it regularly and exactly as prescribed.  Let your PCP know if you have any problems or questions about your medication  ----------------------------------------------------------------------------------------------------------------------  Reducing Your Risk of Falls  · Tell your PCP if any of your medications make you feel tired, dizzy, lightheaded or off-balance.  · Maintain coordination, flexibility and balance by ensuring regular physical activity.  · Limit alcohol intake to 1 drink per day.  Consider avoiding all alcohol intake.  · Ensure good vision.  Visit an ophthalmologist or optometrist regularly for vision screening or to make sure your glasses / contact lens prescription is correct.  If you need glasses or contacts, wear them.  When you get new glasses or contacts, take time to get used to them.  Do not wear sunglasses or tinted lenses when indoors.  · Ensure good hearing.  Have your hearing checked if you are having trouble hearing, or family and friends think you cannot hear them.  If you need a hearing aid, be sure it fits well and wear it.  · Get enough rest.  Ensure about 7-9 hours of sleep every day.  · Get up slowly from your bed or chairs.  Do not start walking until you are sure you feel steady.  · Wear non-skid, rubber-soled, low-heeled shoes.  Do not walk in socks, or in shoes and slippers with smooth soles.  · If your PCP or therapist recommends using a cane or walker, use it regularly.  · Make your home safer.  Increase lighting throughout the house, especially at the top and bottom of  stairs.  Ensure lighting is easily turned on when getting up in the middle of the night.  Make sure there are two secure rails on all stairs.  Install grab bars in the bathtub / shower and near the toilet.  Consider using a shower chair and / or a hand-held shower.  · Spread sand or salt on icy surfaces.  Beware of wet surfaces, which can be icy.  · Tell your PCP if you have fallen.

## 2021-02-01 ENCOUNTER — HOSPITAL ENCOUNTER (OUTPATIENT)
Dept: RADIOLOGY | Facility: HOSPITAL | Age: 66
Discharge: HOME | End: 2021-02-01
Attending: FAMILY MEDICINE
Payer: MEDICARE

## 2021-02-01 DIAGNOSIS — Z13.6 SCREENING FOR AAA (ABDOMINAL AORTIC ANEURYSM): ICD-10-CM

## 2021-02-01 PROCEDURE — 76706 US ABDL AORTA SCREEN AAA: CPT

## 2021-02-02 ENCOUNTER — TELEPHONE (OUTPATIENT)
Dept: PRIMARY CARE | Facility: CLINIC | Age: 66
End: 2021-02-02

## 2021-02-04 ENCOUNTER — TELEPHONE (OUTPATIENT)
Dept: PRIMARY CARE | Facility: CLINIC | Age: 66
End: 2021-02-04

## 2021-02-04 DIAGNOSIS — Z00.00 ROUTINE GENERAL MEDICAL EXAMINATION AT A HEALTH CARE FACILITY: ICD-10-CM

## 2021-02-04 DIAGNOSIS — E55.9 VITAMIN D DEFICIENCY: Primary | ICD-10-CM

## 2021-02-04 DIAGNOSIS — N40.0 BENIGN PROSTATIC HYPERPLASIA, UNSPECIFIED WHETHER LOWER URINARY TRACT SYMPTOMS PRESENT: ICD-10-CM

## 2021-02-05 LAB
25(OH)D3+25(OH)D2 SERPL-MCNC: 41.3 NG/ML (ref 30–100)
ALBUMIN SERPL-MCNC: 4.5 G/DL (ref 3.8–4.8)
ALBUMIN/GLOB SERPL: 2 {RATIO} (ref 1.2–2.2)
ALP SERPL-CCNC: 71 IU/L (ref 39–117)
ALT SERPL-CCNC: 20 IU/L (ref 0–44)
APPEARANCE UR: CLEAR
AST SERPL-CCNC: 20 IU/L (ref 0–40)
BACTERIA #/AREA URNS HPF: NORMAL /[HPF]
BASOPHILS # BLD AUTO: 0.1 X10E3/UL (ref 0–0.2)
BASOPHILS NFR BLD AUTO: 2 %
BILIRUB SERPL-MCNC: 0.6 MG/DL (ref 0–1.2)
BILIRUB UR QL STRIP: NEGATIVE
BUN SERPL-MCNC: 16 MG/DL (ref 8–27)
BUN/CREAT SERPL: 17 (ref 10–24)
CALCIUM SERPL-MCNC: 9.1 MG/DL (ref 8.6–10.2)
CHLORIDE SERPL-SCNC: 102 MMOL/L (ref 96–106)
CO2 SERPL-SCNC: 24 MMOL/L (ref 20–29)
COLOR UR: YELLOW
CREAT SERPL-MCNC: 0.96 MG/DL (ref 0.76–1.27)
EOSINOPHIL # BLD AUTO: 0.2 X10E3/UL (ref 0–0.4)
EOSINOPHIL NFR BLD AUTO: 5 %
EPI CELLS #/AREA URNS HPF: NORMAL /HPF (ref 0–10)
ERYTHROCYTE [DISTWIDTH] IN BLOOD BY AUTOMATED COUNT: 12.6 % (ref 11.6–15.4)
GLOBULIN SER CALC-MCNC: 2.2 G/DL (ref 1.5–4.5)
GLUCOSE SERPL-MCNC: 90 MG/DL (ref 65–99)
GLUCOSE UR QL: NEGATIVE
HCT VFR BLD AUTO: 42.6 % (ref 37.5–51)
HGB BLD-MCNC: 14.4 G/DL (ref 13–17.7)
HGB UR QL STRIP: NEGATIVE
IMM GRANULOCYTES # BLD AUTO: 0 X10E3/UL (ref 0–0.1)
IMM GRANULOCYTES NFR BLD AUTO: 0 %
KETONES UR QL STRIP: NEGATIVE
LAB CORP EGFR IF AFRICN AM: 95 ML/MIN/1.73
LAB CORP EGFR IF NONAFRICN AM: 82 ML/MIN/1.73
LAB CORP URINALYSIS REFLEX: NORMAL
LEUKOCYTE ESTERASE UR QL STRIP: NEGATIVE
LYMPHOCYTES # BLD AUTO: 1 X10E3/UL (ref 0.7–3.1)
LYMPHOCYTES NFR BLD AUTO: 26 %
MCH RBC QN AUTO: 29.7 PG (ref 26.6–33)
MCHC RBC AUTO-ENTMCNC: 33.8 G/DL (ref 31.5–35.7)
MCV RBC AUTO: 88 FL (ref 79–97)
MICRO URNS: NORMAL
MICRO URNS: NORMAL
MONOCYTES # BLD AUTO: 0.4 X10E3/UL (ref 0.1–0.9)
MONOCYTES NFR BLD AUTO: 11 %
MUCOUS THREADS URNS QL MICRO: PRESENT
NEUTROPHILS # BLD AUTO: 2.2 X10E3/UL (ref 1.4–7)
NEUTROPHILS NFR BLD AUTO: 56 %
NITRITE UR QL STRIP: NEGATIVE
PH UR STRIP: 7.5 [PH] (ref 5–7.5)
PLATELET # BLD AUTO: 189 X10E3/UL (ref 150–450)
POTASSIUM SERPL-SCNC: 4.7 MMOL/L (ref 3.5–5.2)
PROT SERPL-MCNC: 6.7 G/DL (ref 6–8.5)
PROT UR QL STRIP: NEGATIVE
PSA SERPL-MCNC: 2.3 NG/ML (ref 0–4)
RBC # BLD AUTO: 4.85 X10E6/UL (ref 4.14–5.8)
RBC #/AREA URNS HPF: NORMAL /HPF (ref 0–2)
SODIUM SERPL-SCNC: 141 MMOL/L (ref 134–144)
SP GR UR: 1.01 (ref 1–1.03)
T4 FREE SERPL-MCNC: 1.24 NG/DL (ref 0.82–1.77)
TSH SERPL DL<=0.005 MIU/L-ACNC: 2.34 UIU/ML (ref 0.45–4.5)
UROBILINOGEN UR STRIP-MCNC: 0.2 MG/DL (ref 0.2–1)
WBC # BLD AUTO: 3.9 X10E3/UL (ref 3.4–10.8)
WBC #/AREA URNS HPF: NORMAL /HPF (ref 0–5)

## 2021-02-05 NOTE — TELEPHONE ENCOUNTER
Let patient know I reviewed his lab--it looks quite good, essentially normal, PSA appears roughly stable at 2.3, follow-up per his urologist, thanks

## 2021-04-13 DIAGNOSIS — Z23 ENCOUNTER FOR IMMUNIZATION: ICD-10-CM

## 2021-04-21 ENCOUNTER — OFFICE VISIT (OUTPATIENT)
Dept: PRIMARY CARE | Facility: CLINIC | Age: 66
End: 2021-04-21
Payer: MEDICARE

## 2021-04-21 VITALS
WEIGHT: 166 LBS | BODY MASS INDEX: 23.24 KG/M2 | HEART RATE: 75 BPM | OXYGEN SATURATION: 97 % | DIASTOLIC BLOOD PRESSURE: 80 MMHG | SYSTOLIC BLOOD PRESSURE: 130 MMHG | RESPIRATION RATE: 18 BRPM | TEMPERATURE: 98.4 F | HEIGHT: 71 IN

## 2021-04-21 DIAGNOSIS — G47.33 OSA (OBSTRUCTIVE SLEEP APNEA): ICD-10-CM

## 2021-04-21 DIAGNOSIS — I10 ESSENTIAL HYPERTENSION: ICD-10-CM

## 2021-04-21 DIAGNOSIS — F41.9 ANXIETY DISORDER, UNSPECIFIED TYPE: ICD-10-CM

## 2021-04-21 DIAGNOSIS — M25.561 PAIN IN BOTH KNEES, UNSPECIFIED CHRONICITY: ICD-10-CM

## 2021-04-21 DIAGNOSIS — M51.369 DDD (DEGENERATIVE DISC DISEASE), LUMBAR: ICD-10-CM

## 2021-04-21 DIAGNOSIS — N40.0 BENIGN PROSTATIC HYPERPLASIA, UNSPECIFIED WHETHER LOWER URINARY TRACT SYMPTOMS PRESENT: ICD-10-CM

## 2021-04-21 DIAGNOSIS — I25.10 CORONARY ARTERY DISEASE INVOLVING NATIVE CORONARY ARTERY OF NATIVE HEART WITHOUT ANGINA PECTORIS: ICD-10-CM

## 2021-04-21 DIAGNOSIS — K64.9 HEMORRHOIDS, UNSPECIFIED HEMORRHOID TYPE: ICD-10-CM

## 2021-04-21 DIAGNOSIS — M25.562 LEFT KNEE PAIN, UNSPECIFIED CHRONICITY: Primary | ICD-10-CM

## 2021-04-21 DIAGNOSIS — J45.909 ASTHMA, UNSPECIFIED ASTHMA SEVERITY, UNSPECIFIED WHETHER COMPLICATED, UNSPECIFIED WHETHER PERSISTENT: ICD-10-CM

## 2021-04-21 DIAGNOSIS — M54.16 LUMBAR RADICULOPATHY: ICD-10-CM

## 2021-04-21 DIAGNOSIS — N39.41 URGE INCONTINENCE: ICD-10-CM

## 2021-04-21 DIAGNOSIS — K21.9 GASTROESOPHAGEAL REFLUX DISEASE, UNSPECIFIED WHETHER ESOPHAGITIS PRESENT: ICD-10-CM

## 2021-04-21 DIAGNOSIS — M25.562 PAIN IN BOTH KNEES, UNSPECIFIED CHRONICITY: ICD-10-CM

## 2021-04-21 PROCEDURE — G8752 SYS BP LESS 140: HCPCS | Performed by: FAMILY MEDICINE

## 2021-04-21 PROCEDURE — G8754 DIAS BP LESS 90: HCPCS | Performed by: FAMILY MEDICINE

## 2021-04-21 PROCEDURE — 99214 OFFICE O/P EST MOD 30 MIN: CPT | Performed by: FAMILY MEDICINE

## 2021-04-21 RX ORDER — ZOSTER VACCINE RECOMBINANT, ADJUVANTED 50 MCG/0.5
50 KIT INTRAMUSCULAR ONCE
Qty: 0.5 ML | Refills: 1 | Status: SHIPPED | OUTPATIENT
Start: 2021-04-21 | End: 2021-04-21

## 2021-04-21 RX ORDER — ESCITALOPRAM OXALATE 5 MG/1
5 TABLET ORAL DAILY
COMMUNITY
End: 2021-04-21 | Stop reason: ENTERED-IN-ERROR

## 2021-04-21 RX ORDER — DIAZEPAM 2 MG/1
TABLET ORAL
Qty: 20 TABLET | Refills: 0 | Status: SHIPPED | OUTPATIENT
Start: 2021-04-21 | End: 2022-03-07

## 2021-04-21 ASSESSMENT — ENCOUNTER SYMPTOMS
CONSTITUTIONAL NEGATIVE: 1
COUGH: 0
SHORTNESS OF BREATH: 0
EYES NEGATIVE: 1
PSYCHIATRIC NEGATIVE: 1
ABDOMINAL PAIN: 0
UNEXPECTED WEIGHT CHANGE: 0
APPETITE CHANGE: 0
ALLERGIC/IMMUNOLOGIC NEGATIVE: 1
RESPIRATORY NEGATIVE: 1
HEMATOLOGIC/LYMPHATIC NEGATIVE: 1
ENDOCRINE NEGATIVE: 1
DIFFICULTY URINATING: 0
CHILLS: 0
NUMBNESS: 1
GASTROINTESTINAL NEGATIVE: 1
WEAKNESS: 0
DIZZINESS: 0
DIARRHEA: 0
MUSCULOSKELETAL NEGATIVE: 1
FEVER: 0
CARDIOVASCULAR NEGATIVE: 1
BLOOD IN STOOL: 0

## 2021-04-21 NOTE — PROGRESS NOTES
Subjective      Patient ID: Dandy Lomax is a 65 y.o. male.    3 mo f/u, L knee injury in yard 1 w/a, sx resolving--lifting flagstone, kneeling on flagstone, R knee sx last yr s/p ANTONIA; hx complex tear R, loose cartilage since teen; saw Carol sheppard w r knee (had pl films, MRI); ?PT--wishes to wait for back surg f/u--performs squats w exercise routine; updates--EGD scheduled in June, had covid vax #2 2/28; pulm-asthma (sa02 stable, doesn't feel copd re hyperexpansion, but may be gerd), no f/u; oks shingrix; cardio b1du--md med bid (didn't do)--home bp 130s/80s, cardio follows clinically w stress testing; [facility]--osteoporosis--no recent eval; neurosurg/mri --pain stable, second opinion josie christensen; uro incr psa--3 mo f/u psa in a few wks (pt graph scanned in)--stable over long pd, has had bx; urinary sx incr w stress; ent re salivary stones, [facility], f/u May 'not worried'; mery new cpap (?settings); dr riggins psych gave sm dose benzo prior    The following have been reviewed and updated as appropriate in this visit:  Tobacco  Allergies  Meds  Problems  Med Hx  Surg Hx  Fam Hx       Patient Active Problem List   Diagnosis   • Coronary artery disease   • Dyslipidemia   • Prothrombin mutation (CMS/HCC)   • Hyperhomocystinemia (CMS/HCC)   • MERY (obstructive sleep apnea)   • Essential hypertension   • Anxiety disorder   • GERD (gastroesophageal reflux disease)   • Varicose veins of legs   • Trigger ring finger of right hand   • Osteoporosis   • Knee pain, bilateral   • Insomnia   • Cyclic neutropenia (CMS/HCC)   • Chest tightness   • Cough   • Allergic rhinitis   • Dyspnea   • Bruising   • DDD (degenerative disc disease), lumbar   • Low testosterone   • Constipation   • Lumbar stenosis without neurogenic claudication   • Lumbar radiculopathy   • Lumbar facet arthropathy   • Medicare annual wellness visit, initial   • Vitamin D deficiency   • Asthma   • History of prostatitis   • Dermatitis    • Immunization due   • S/P epidural steroid injection   • Salivary stones   • Urge incontinence   • Nocturia   • Idiopathic peripheral neuropathy   • Acute diarrhea   • Abdominal cramping   • Myalgia   • Weakness   • Gastroenteritis   • Encounter for preventive health examination   • Benign prostatic hyperplasia   • Screening for AAA (abdominal aortic aneurysm)   • Medicare annual wellness visit, subsequent   • Dry skin dermatitis   • Hx of nonmelanoma skin cancer   • Advance directive on file   • Left knee pain   • Hemorrhoids     Past Surgical History:   Procedure Laterality Date   • CATARACT EXTRACTION     • COLONOSCOPY     • ESOPHAGOGASTRODUODENOSCOPY     • LUMBAR LAMINECTOMY       Family History   Problem Relation Age of Onset   • Hypertension Biological Mother    • Heart failure Biological Mother    • Uterine cancer Biological Mother    • Heart attack Biological Father    • Hypertension Biological Father    • COPD Biological Father    • No Known Problems Biological Sister      Social History     Socioeconomic History   • Marital status:      Spouse name: Not on file   • Number of children: 1   • Years of education: Not on file   • Highest education level: Not on file   Occupational History   • Occupation:    Tobacco Use   • Smoking status: Never Smoker   • Smokeless tobacco: Never Used   Vaping Use   • Vaping Use: Never used   Substance and Sexual Activity   • Alcohol use: Yes     Comment: Rare alcohol   • Drug use: No   • Sexual activity: Not on file   Other Topics Concern   • Not on file   Social History Narrative    One son, 12 y/o.      Social Determinants of Health     Financial Resource Strain:    • Difficulty of Paying Living Expenses:    Food Insecurity:    • Worried About Running Out of Food in the Last Year:    • Ran Out of Food in the Last Year:    Transportation Needs:    • Lack of Transportation (Medical):    • Lack of Transportation (Non-Medical):    Physical  Activity:    • Days of Exercise per Week:    • Minutes of Exercise per Session:    Stress:    • Feeling of Stress :    Social Connections:    • Frequency of Communication with Friends and Family:    • Frequency of Social Gatherings with Friends and Family:    • Attends Zoroastrianism Services:    • Active Member of Clubs or Organizations:    • Attends Club or Organization Meetings:    • Marital Status:    Intimate Partner Violence:    • Fear of Current or Ex-Partner:    • Emotionally Abused:    • Physically Abused:    • Sexually Abused:        Review of Systems   Constitutional: Negative.  Negative for appetite change, chills, fever and unexpected weight change.   HENT: Negative.    Eyes: Negative.    Respiratory: Negative.  Negative for cough and shortness of breath.    Cardiovascular: Negative.  Negative for chest pain.   Gastrointestinal: Negative.  Negative for abdominal pain, blood in stool and diarrhea.        Hemorrhoid, occ bleed, last wk--stopped fiber--restart, has seen colorectal, had banding   Endocrine: Negative.    Genitourinary: Negative.  Negative for difficulty urinating.   Musculoskeletal: Negative.    Skin: Negative.  Negative for rash.   Allergic/Immunologic: Negative.    Neurological: Positive for numbness. Negative for dizziness and weakness.        Occ feet/sciatica when walks a lot   Hematological: Negative.    Psychiatric/Behavioral: Negative.         Sleeps good, anx stable, infreq ambien use       No Known Allergies  Current Outpatient Medications   Medication Sig Dispense Refill   • alpha lipoic acid 200 mg capsule Take 200 mg by mouth once daily.     • aspirin (ASPIR-81) 81 mg enteric coated tablet Take 81 mg by mouth once daily.     • atorvastatin (LIPITOR) 10 mg tablet Take 1 tablet (10 mg total) by mouth daily. 90 tablet 3   • cholecalciferol, vitamin D3, (VITAMIN D3) 2,000 unit tablet Take by mouth daily.     • coenzyme Q10 (CO Q-10) 200 mg capsule Take 200 mg by mouth once daily.     •  "Lactobac no.41-Bifidobact no.7 (PROBIOTIC-10) 70 mg (3 billion cell) capsule Take by mouth daily.     • losartan (COZAAR) 50 mg tablet Take 1 tablet (50 mg total) by mouth daily. 90 tablet 3   • magnesium 250 mg tablet Take 250 mg by mouth daily.     • MULTIVIT WITH MINERALS/LUTEIN (MULTIVITAMIN 50 PLUS ORAL) Take by mouth once daily.     • omega-3 fatty acids 1,000 mg capsule Take by mouth once daily. Two capsules daily     • pantoprazole (PROTONIX) 40 mg EC tablet Take 40 mg by mouth daily.       • resveratrol 250 mg capsule Take 250 mg by mouth daily.       • zolpidem (AMBIEN) 5 mg tablet Take 1 tablet (5 mg total) by mouth daily. (Patient taking differently: Take 5 mg by mouth nightly as needed.  ) 30 tablet 0   • diazePAM (VALIUM) 2 mg tablet 1 hs prn anxiety 20 tablet 0     No current facility-administered medications for this visit.       Objective   Vitals:    04/21/21 1143   BP: 130/80   BP Location: Left upper arm   Patient Position: Sitting   Pulse: 75   Resp: 18   Temp: 36.9 °C (98.4 °F)   SpO2: 97%   Weight: 75.3 kg (166 lb)   Height: 1.803 m (5' 11\")       Physical Exam  Vitals reviewed.   Constitutional:       General: He is not in acute distress.     Appearance: Normal appearance. He is well-developed. He is not ill-appearing.      Comments: Pleasant, usual state of health, thin, generally fit appearing   HENT:      Head: Normocephalic and atraumatic.      Right Ear: Tympanic membrane, ear canal and external ear normal.      Left Ear: Tympanic membrane, ear canal and external ear normal.      Nose: Nose normal.      Comments: incr pnd  Eyes:      Extraocular Movements: Extraocular movements intact.      Conjunctiva/sclera: Conjunctivae normal.      Pupils: Pupils are equal, round, and reactive to light.   Neck:      Thyroid: No thyromegaly.      Vascular: No carotid bruit.   Cardiovascular:      Rate and Rhythm: Normal rate and regular rhythm.      Heart sounds: Normal heart sounds. No murmur heard. "     Pulmonary:      Effort: Pulmonary effort is normal.      Breath sounds: Normal breath sounds.   Abdominal:      General: Bowel sounds are normal.      Palpations: Abdomen is soft.   Musculoskeletal:      Cervical back: Normal range of motion and neck supple.      Comments: Neg acute findings knee   Lymphadenopathy:      Cervical: No cervical adenopathy.   Skin:     General: Skin is warm and dry.   Neurological:      Mental Status: He is alert and oriented to person, place, and time.   Psychiatric:         Mood and Affect: Mood normal.         Behavior: Behavior normal.         Thought Content: Thought content normal.         Judgment: Judgment normal.      Comments: Minimal anx at time of exam         Assessment/Plan   Diagnoses and all orders for this visit:    Left knee pain, unspecified chronicity (Primary)  Assessment & Plan:  Image, PT    Orders:  -     X-RAY KNEE LEFT 4+ VIEWS; Future    Pain in both knees, unspecified chronicity  Assessment & Plan:  PT pending Maribell mendoza--avoid squatting, reviewed djd; rec knee pads for kneeling; px    Orders:  -     Ambulatory referral to Physical Therapy; Future    DDD (degenerative disc disease), lumbar    Lumbar radiculopathy  Assessment & Plan:  Second opinion pending Maribell      Essential hypertension  Assessment & Plan:  Stable, cont per cardio, meds      Coronary artery disease involving native coronary artery of native heart without angina pectoris  Assessment & Plan:  Reviewed monitoring options, has had stress      MERY (obstructive sleep apnea)  Assessment & Plan:  Adjustments new cpap per pulm      Asthma, unspecified asthma severity, unspecified whether complicated, unspecified whether persistent  Assessment & Plan:  Stable, ?related to gerd; rx allergy/asthma prn      Gastroesophageal reflux disease, unspecified whether esophagitis present  Assessment & Plan:  EGD pndg; cont PPI      Benign prostatic hyperplasia, unspecified whether lower urinary tract  symptoms present  Assessment & Plan:  PSA, sx stable--cont per uro rec; pt data scanned      Urge incontinence    Anxiety disorder, unspecified type  Assessment & Plan:  Stable, renew lawson 2 mg, takes re SE profile per psych when awakens at night, infrequently prn; checked pdmp      Hemorrhoids, unspecified hemorrhoid type  Assessment & Plan:  rec qd fiber (not prn)      Other orders  -     varicella-zoster [id number], PF, (SHINGRIX, PF,) 50 mcg/0.5 mL suspension for reconstitution injection; Inject 0.5 mL (50 mcg total) into the shoulder, thigh, or buttocks once for 1 dose.  -     diazePAM (VALIUM) 2 mg tablet; 1 hs prn anxiety        Sami Lomax, DO    4/22/2021

## 2021-04-22 PROBLEM — K64.9 HEMORRHOIDS: Status: ACTIVE | Noted: 2021-04-22

## 2021-04-22 PROBLEM — M25.562 LEFT KNEE PAIN: Status: ACTIVE | Noted: 2021-04-22

## 2021-04-22 NOTE — ASSESSMENT & PLAN NOTE
Stable, renew lawson 2 mg, takes re SE profile per psych when awakens at night, infrequently prn; checked pdmp

## 2021-06-04 ENCOUNTER — TELEPHONE (OUTPATIENT)
Dept: SCHEDULING | Facility: CLINIC | Age: 66
End: 2021-06-04

## 2021-06-04 NOTE — TELEPHONE ENCOUNTER
Pt would like to know what type of blood work he needs prior to ov and fish oil question    Pt can be reached at [phone]

## 2021-06-07 ENCOUNTER — TELEPHONE (OUTPATIENT)
Dept: SCHEDULING | Facility: CLINIC | Age: 66
End: 2021-06-07

## 2021-06-07 NOTE — TELEPHONE ENCOUNTER
Pt called looking to reschedule 7/19 appt to sometime week of 8/9 with Dr Gustafson at [facility] [place].    Please call pt to reschedule at [phone]

## 2021-06-08 NOTE — TELEPHONE ENCOUNTER
Pt states he received a message about his appt but couldn't hear it and doesn't know who called.  Pt requesting a callback at [phone].

## 2021-06-17 DIAGNOSIS — E78.5 DYSLIPIDEMIA: Primary | ICD-10-CM

## 2021-06-17 NOTE — TELEPHONE ENCOUNTER
Please call pt, he just needs basic lipid panel for next visit, I put labs in.  He is already taking fish il, he does not need prescription strength at this point. See if you can answer his fish oil question as to brands we like.

## 2021-07-01 RX ORDER — ZOLPIDEM TARTRATE 5 MG/1
5 TABLET ORAL NIGHTLY PRN
Qty: 30 TABLET | Refills: 0 | Status: SHIPPED | OUTPATIENT
Start: 2021-07-01 | End: 2021-08-02 | Stop reason: SDUPTHER

## 2021-07-01 NOTE — TELEPHONE ENCOUNTER
Medicine Refill Request    Last Office Visit: 4/21/2021  Last Telemedicine Visit: 1/12/2021 Sami Lomax DO    Next Office Visit: 7/26/2021  Next Telemedicine Visit: Visit date not found         Current Outpatient Medications:   •  alpha lipoic acid 200 mg capsule, Take 200 mg by mouth once daily., Disp: , Rfl:   •  aspirin (ASPIR-81) 81 mg enteric coated tablet, Take 81 mg by mouth once daily., Disp: , Rfl:   •  atorvastatin (LIPITOR) 10 mg tablet, Take 1 tablet (10 mg total) by mouth daily., Disp: 90 tablet, Rfl: 3  •  cholecalciferol, vitamin D3, (VITAMIN D3) 2,000 unit tablet, Take by mouth daily., Disp: , Rfl:   •  coenzyme Q10 (CO Q-10) 200 mg capsule, Take 200 mg by mouth once daily., Disp: , Rfl:   •  diazePAM (VALIUM) 2 mg tablet, 1 hs prn anxiety, Disp: 20 tablet, Rfl: 0  •  Lactobac no.41-Bifidobact no.7 (PROBIOTIC-10) 70 mg (3 billion cell) capsule, Take by mouth daily., Disp: , Rfl:   •  losartan (COZAAR) 50 mg tablet, Take 1 tablet (50 mg total) by mouth daily., Disp: 90 tablet, Rfl: 3  •  magnesium 250 mg tablet, Take 250 mg by mouth daily., Disp: , Rfl:   •  MULTIVIT WITH MINERALS/LUTEIN (MULTIVITAMIN 50 PLUS ORAL), Take by mouth once daily., Disp: , Rfl:   •  omega-3 fatty acids 1,000 mg capsule, Take by mouth once daily. Two capsules daily, Disp: , Rfl:   •  pantoprazole (PROTONIX) 40 mg EC tablet, Take 40 mg by mouth daily.  , Disp: , Rfl:   •  resveratrol 250 mg capsule, Take 250 mg by mouth daily.  , Disp: , Rfl:   •  zolpidem (AMBIEN) 5 mg tablet, Take 1 tablet (5 mg total) by mouth daily. (Patient taking differently: Take 5 mg by mouth nightly as needed.  ), Disp: 30 tablet, Rfl: 0      BP Readings from Last 3 Encounters:   04/21/21 130/80   01/27/21 130/68   11/20/20 130/80       Recent Lab results:  Lab Results   Component Value Date    CHOL 159 01/27/2020   ,   Lab Results   Component Value Date    HDL 82 01/27/2020   ,   Lab Results   Component Value Date    LDLCALC 71 01/27/2020   ,    Lab Results   Component Value Date    TRIG 30 01/27/2020        Lab Results   Component Value Date    GLUCOSE 90 02/04/2021   ,   Lab Results   Component Value Date    HGBA1C 5.4 12/12/2016         Lab Results   Component Value Date    CREATININE 0.96 02/04/2021       Lab Results   Component Value Date    TSH 2.340 02/04/2021

## 2021-07-01 NOTE — TELEPHONE ENCOUNTER
I have queried the state Prescription Drug Monitoring Program [PDMP] and found no suspicious PDMP activity and I will prescribe a controlled medication(s).ambien

## 2021-07-26 ENCOUNTER — TELEPHONE (OUTPATIENT)
Dept: SCHEDULING | Facility: CLINIC | Age: 66
End: 2021-07-26

## 2021-07-26 ENCOUNTER — OFFICE VISIT (OUTPATIENT)
Dept: PRIMARY CARE | Facility: CLINIC | Age: 66
End: 2021-07-26
Payer: MEDICARE

## 2021-07-26 VITALS
SYSTOLIC BLOOD PRESSURE: 122 MMHG | BODY MASS INDEX: 22.82 KG/M2 | HEIGHT: 71 IN | DIASTOLIC BLOOD PRESSURE: 72 MMHG | WEIGHT: 163 LBS | OXYGEN SATURATION: 97 % | HEART RATE: 72 BPM | RESPIRATION RATE: 18 BRPM | TEMPERATURE: 98.4 F

## 2021-07-26 DIAGNOSIS — K44.9 HERNIA, HIATAL: ICD-10-CM

## 2021-07-26 DIAGNOSIS — F41.9 ANXIETY DISORDER, UNSPECIFIED TYPE: ICD-10-CM

## 2021-07-26 DIAGNOSIS — K21.9 GASTROESOPHAGEAL REFLUX DISEASE, UNSPECIFIED WHETHER ESOPHAGITIS PRESENT: ICD-10-CM

## 2021-07-26 DIAGNOSIS — I25.10 CORONARY ARTERY DISEASE INVOLVING NATIVE CORONARY ARTERY OF NATIVE HEART WITHOUT ANGINA PECTORIS: Primary | ICD-10-CM

## 2021-07-26 DIAGNOSIS — N40.0 BENIGN PROSTATIC HYPERPLASIA, UNSPECIFIED WHETHER LOWER URINARY TRACT SYMPTOMS PRESENT: ICD-10-CM

## 2021-07-26 DIAGNOSIS — I10 ESSENTIAL HYPERTENSION: ICD-10-CM

## 2021-07-26 DIAGNOSIS — I83.93 VARICOSE VEINS OF BOTH LOWER EXTREMITIES, UNSPECIFIED WHETHER COMPLICATED: ICD-10-CM

## 2021-07-26 DIAGNOSIS — L30.9 DERMATITIS: ICD-10-CM

## 2021-07-26 DIAGNOSIS — M81.0 OSTEOPOROSIS, UNSPECIFIED OSTEOPOROSIS TYPE, UNSPECIFIED PATHOLOGICAL FRACTURE PRESENCE: ICD-10-CM

## 2021-07-26 DIAGNOSIS — M51.369 DDD (DEGENERATIVE DISC DISEASE), LUMBAR: ICD-10-CM

## 2021-07-26 PROCEDURE — 99214 OFFICE O/P EST MOD 30 MIN: CPT | Performed by: FAMILY MEDICINE

## 2021-07-26 PROCEDURE — G8752 SYS BP LESS 140: HCPCS | Performed by: FAMILY MEDICINE

## 2021-07-26 PROCEDURE — G8754 DIAS BP LESS 90: HCPCS | Performed by: FAMILY MEDICINE

## 2021-07-26 RX ORDER — ATORVASTATIN CALCIUM 10 MG/1
10 TABLET, FILM COATED ORAL DAILY
Qty: 90 TABLET | Refills: 3 | Status: SHIPPED | OUTPATIENT
Start: 2021-07-26 | End: 2022-07-12 | Stop reason: SDUPTHER

## 2021-07-26 RX ORDER — LOSARTAN POTASSIUM 50 MG/1
50 TABLET ORAL DAILY
Qty: 90 TABLET | Refills: 3 | Status: SHIPPED | OUTPATIENT
Start: 2021-07-26 | End: 2022-07-12 | Stop reason: SDUPTHER

## 2021-07-26 ASSESSMENT — ENCOUNTER SYMPTOMS
COUGH: 0
RESPIRATORY NEGATIVE: 1
ENDOCRINE NEGATIVE: 1
DIFFICULTY URINATING: 0
FEVER: 0
SHORTNESS OF BREATH: 0
ARTHRALGIAS: 0
DIARRHEA: 0
DIZZINESS: 0
APPETITE CHANGE: 0
NUMBNESS: 0
EYES NEGATIVE: 1
UNEXPECTED WEIGHT CHANGE: 0
GASTROINTESTINAL NEGATIVE: 1
HEMATOLOGIC/LYMPHATIC NEGATIVE: 1
CHILLS: 0
WEAKNESS: 0
ROS GI COMMENTS: COLO UTD
CARDIOVASCULAR NEGATIVE: 1
CONSTITUTIONAL NEGATIVE: 1
PSYCHIATRIC NEGATIVE: 1
ABDOMINAL PAIN: 0
ALLERGIC/IMMUNOLOGIC NEGATIVE: 1
MYALGIAS: 0
MUSCULOSKELETAL NEGATIVE: 1
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
## 2021-07-26 NOTE — TELEPHONE ENCOUNTER
Medicine Refill Request    Last Office Visit: Visit date not found  Last Telemedicine Visit: Visit date not found    Next Office Visit: Visit date not found  Next Telemedicine Visit: Visit date not found     Losartan 50 mg  Atorvastatin 10 mg  90 day supply  OUT OF MEDS        Current Outpatient Medications:   •  alpha lipoic acid 200 mg capsule, Take 200 mg by mouth once daily., Disp: , Rfl:   •  aspirin (ASPIR-81) 81 mg enteric coated tablet, Take 81 mg by mouth once daily., Disp: , Rfl:   •  atorvastatin (LIPITOR) 10 mg tablet, Take 1 tablet (10 mg total) by mouth daily., Disp: 90 tablet, Rfl: 3  •  cholecalciferol, vitamin D3, (VITAMIN D3) 2,000 unit tablet, Take by mouth daily., Disp: , Rfl:   •  coenzyme Q10 (CO Q-10) 200 mg capsule, Take 200 mg by mouth once daily., Disp: , Rfl:   •  diazePAM (VALIUM) 2 mg tablet, 1 hs prn anxiety, Disp: 20 tablet, Rfl: 0  •  Lactobac no.41-Bifidobact no.7 (PROBIOTIC-10) 70 mg (3 billion cell) capsule, Take by mouth daily., Disp: , Rfl:   •  losartan (COZAAR) 50 mg tablet, Take 1 tablet (50 mg total) by mouth daily., Disp: 90 tablet, Rfl: 3  •  magnesium 250 mg tablet, Take 250 mg by mouth daily., Disp: , Rfl:   •  MULTIVIT WITH MINERALS/LUTEIN (MULTIVITAMIN 50 PLUS ORAL), Take by mouth once daily., Disp: , Rfl:   •  omega-3 fatty acids 1,000 mg capsule, Take by mouth once daily. Two capsules daily, Disp: , Rfl:   •  pantoprazole (PROTONIX) 40 mg EC tablet, Take 40 mg by mouth daily.  , Disp: , Rfl:   •  resveratrol 250 mg capsule, Take 250 mg by mouth daily.  , Disp: , Rfl:   •  zolpidem (AMBIEN) 5 mg tablet, Take 1 tablet (5 mg total) by mouth nightly as needed for sleep., Disp: 30 tablet, Rfl: 0      BP Readings from Last 3 Encounters:   04/21/21 130/80   01/27/21 130/68   11/20/20 130/80       Recent Lab results:  Lab Results   Component Value Date    CHOL 159 01/27/2020   ,   Lab Results   Component Value Date    HDL 82 01/27/2020   ,   Lab Results   Component Value Date     LDLCALC 71 01/27/2020   ,   Lab Results   Component Value Date    TRIG 30 01/27/2020        Lab Results   Component Value Date    GLUCOSE 90 02/04/2021   ,   Lab Results   Component Value Date    HGBA1C 5.4 12/12/2016         Lab Results   Component Value Date    CREATININE 0.96 02/04/2021       Lab Results   Component Value Date    TSH 2.340 02/04/2021

## 2021-07-27 PROBLEM — K44.9 HERNIA, HIATAL: Status: ACTIVE | Noted: 2021-07-27
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
## 2021-07-29 ENCOUNTER — TELEPHONE (OUTPATIENT)
Dept: SCHEDULING | Facility: CLINIC | Age: 66
End: 2021-07-29

## 2021-07-29 NOTE — TELEPHONE ENCOUNTER
Pt still at the lab, waiting for slips, states it was faxed to his cell, not the Lab.      f-[phone]

## 2021-07-29 NOTE — TELEPHONE ENCOUNTER
Pt calling in regards to previous messages. Pt state he is still waiting    Pt can be reached at [phone]

## 2021-07-29 NOTE — TELEPHONE ENCOUNTER
Last called to request lab orders be faxed to [facility], he is in the office now.  The labs currently in the system are [facility] labs     F# [phone]

## 2021-07-30 LAB
ALBUMIN SERPL-MCNC: 4.6 G/DL (ref 3.8–4.8)
ALBUMIN/GLOB SERPL: 2.2 {RATIO} (ref 1.2–2.2)
ALP SERPL-CCNC: 69 IU/L (ref 48–121)
ALT SERPL-CCNC: 18 IU/L (ref 0–44)
AST SERPL-CCNC: 21 IU/L (ref 0–40)
BILIRUB SERPL-MCNC: 0.8 MG/DL (ref 0–1.2)
BUN SERPL-MCNC: 17 MG/DL (ref 8–27)
BUN/CREAT SERPL: 17 (ref 10–24)
CALCIUM SERPL-MCNC: 9.3 MG/DL (ref 8.6–10.2)
CHLORIDE SERPL-SCNC: 103 MMOL/L (ref 96–106)
CHOLEST SERPL-MCNC: 167 MG/DL (ref 100–199)
CO2 SERPL-SCNC: 27 MMOL/L (ref 20–29)
CREAT SERPL-MCNC: 0.98 MG/DL (ref 0.76–1.27)
GLOBULIN SER CALC-MCNC: 2.1 G/DL (ref 1.5–4.5)
GLUCOSE SERPL-MCNC: 92 MG/DL (ref 65–99)
HDLC SERPL-MCNC: 78 MG/DL
LAB CORP EGFR IF AFRICN AM: 92 ML/MIN/1.73
LAB CORP EGFR IF NONAFRICN AM: 79 ML/MIN/1.73
LDLC SERPL CALC-MCNC: 81 MG/DL (ref 0–99)
POTASSIUM SERPL-SCNC: 4.8 MMOL/L (ref 3.5–5.2)
PROT SERPL-MCNC: 6.7 G/DL (ref 6–8.5)
SODIUM SERPL-SCNC: 142 MMOL/L (ref 134–144)
SPECIMEN STATUS: NORMAL
TRIGL SERPL-MCNC: 33 MG/DL (ref 0–149)
VLDLC SERPL CALC-MCNC: 8 MG/DL (ref 5–40)

## 2021-08-02 RX ORDER — ZOLPIDEM TARTRATE 5 MG/1
5 TABLET ORAL NIGHTLY PRN
Qty: 30 TABLET | Refills: 0 | Status: SHIPPED | OUTPATIENT
Start: 2021-08-02 | End: 2022-05-12 | Stop reason: SDUPTHER

## 2021-08-02 NOTE — TELEPHONE ENCOUNTER
zolpidem (AMBIEN) 5 mg tablet  1 po QD  #3       [facility] [place]  [id number]    Forgot his medication at home.   He was reminding to establish care with a new provider

## 2021-08-02 NOTE — TELEPHONE ENCOUNTER
Medicine Refill Request    Last Office Visit: 7/26/2021  Last Telemedicine Visit: 1/12/2021 Sami Lomax DO    Next Office Visit: Visit date not found  Next Telemedicine Visit: Visit date not found         Current Outpatient Medications:   •  alpha lipoic acid 200 mg capsule, Take 200 mg by mouth once daily., Disp: , Rfl:   •  aspirin (ASPIR-81) 81 mg enteric coated tablet, Take 81 mg by mouth once daily., Disp: , Rfl:   •  atorvastatin (LIPITOR) 10 mg tablet, Take 1 tablet (10 mg total) by mouth daily., Disp: 90 tablet, Rfl: 3  •  cholecalciferol, vitamin D3, (VITAMIN D3) 2,000 unit tablet, Take by mouth daily., Disp: , Rfl:   •  coenzyme Q10 (CO Q-10) 200 mg capsule, Take 200 mg by mouth once daily., Disp: , Rfl:   •  diazePAM (VALIUM) 2 mg tablet, 1 hs prn anxiety, Disp: 20 tablet, Rfl: 0  •  Lactobac no.41-Bifidobact no.7 (PROBIOTIC-10) 70 mg (3 billion cell) capsule, Take by mouth daily., Disp: , Rfl:   •  losartan (COZAAR) 50 mg tablet, Take 1 tablet (50 mg total) by mouth daily., Disp: 90 tablet, Rfl: 3  •  magnesium 250 mg tablet, Take 250 mg by mouth daily., Disp: , Rfl:   •  MULTIVIT WITH MINERALS/LUTEIN (MULTIVITAMIN 50 PLUS ORAL), Take by mouth once daily., Disp: , Rfl:   •  omega-3 fatty acids 1,000 mg capsule, Take by mouth once daily. Two capsules daily, Disp: , Rfl:   •  pantoprazole (PROTONIX) 40 mg EC tablet, Take 40 mg by mouth daily.  , Disp: , Rfl:   •  resveratrol 250 mg capsule, Take 250 mg by mouth daily.  , Disp: , Rfl:   •  zolpidem (AMBIEN) 5 mg tablet, Take 1 tablet (5 mg total) by mouth nightly as needed for sleep., Disp: 30 tablet, Rfl: 0      BP Readings from Last 3 Encounters:   07/26/21 122/72   04/21/21 130/80   01/27/21 130/68       Recent Lab results:  Lab Results   Component Value Date    CHOL 167 07/29/2021   ,   Lab Results   Component Value Date    HDL 78 07/29/2021   ,   Lab Results   Component Value Date    LDLCALC 81 07/29/2021   ,   Lab Results   Component Value Date     TRIG 33 07/29/2021        Lab Results   Component Value Date    GLUCOSE 92 07/29/2021   ,   Lab Results   Component Value Date    HGBA1C 5.4 12/12/2016         Lab Results   Component Value Date    CREATININE 0.98 07/29/2021       Lab Results   Component Value Date    TSH 2.340 02/04/2021

## 2021-08-06 NOTE — PROGRESS NOTES
[facility]    2021      Re:  DANDY LOMAX  :  1955    Dear Sami:    It was my pleasure to see your patient, Dandy Lomax, in the [facility] today.      As you know, we follow him for subclinical coronary artery disease and dyslipidemia.     The patient has been through a previous cardiac workup by Dr. Santo Tucker at [place].  A coronary calcium score was obtained in  which revealed Agatston score 174 with significant plaquing of his LAD.  This prompted a stress study which was equivocal for ischemia.  This was followed by a diagnostic left heart catheterization in 2016 which demonstrated moderate nonobstructive Mid LAD disease with negative FFR.  Medical management was indicated.    The patient has been on a prevention program over the past several years.  He is on Lipitor 10 mg daily.  His recent lipid panel dated 2021 revealed a total cholesterol of 167, triglycerides 33, HDL 78, LDL 81.  This is an excellent response to Lipitor 10 mg daily.      On his advanced lipid panel in  his bioinflammatory markers including HSCRP and LPPLA-2 were normal.  His endothelial function was normal.  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  He has a prothrombin mutation which increases his risk for venous thrombosis.  His homocystine level is elevated at 11.  There was no evidence of insulin resistance.  His ferritin levels are low.  His omega-3 index was10.9.    We reviewed his medications and supplements in detail and are making no substantial changes today.  He will continue on with prevention program as outlined above.    PAST MEDICAL HISTORY:    Past Medical History:   Diagnosis Date   • Abnormal ECG     RBBB   • Allergic rhinitis 10/1/2019   • Anxiety    • Coronary artery disease     Coronary artery disease, cardiac catheterization in 2016, moderate stenosis involving the mid LAD, FFR negative    • DDD (degenerative disc disease), lumbar 10/1/2019   • Dyslipidemia 4/20/2018   • Essential hypertension 4/20/2018   • GERD (gastroesophageal reflux disease)    • Hyperhomocysteinemia (CMS/HCC)    • Hyperhomocystinemia (CMS/HCC) 4/20/2018   • Hypertension    • Idiopathic peripheral neuropathy 10/26/2020   • Lipid disorder    • Obstructive sleep apnea    • Prothrombin mutation (CMS/HCC) 4/20/2018   • Varicose veins of legs 6/8/2017    Last Assessment & Plan:  Right lower leg VV  Ref Dr Wendy Qurioz at [facility]   • Vitamin D deficiency 1/10/2020       PAST SURGICAL HISTORY:  Retinal tear, prostate biopsy, hernia repair.    CURRENT MEDICATIONS:   Current Outpatient Medications   Medication Instructions   • alpha lipoic acid 200 mg, oral, Daily   • aspirin (ASPIR-81) 81 mg, oral, Daily   • atorvastatin (LIPITOR) 10 mg, oral, Daily   • cholecalciferol, vitamin D3, (VITAMIN D3) 2,000 unit tablet oral, Daily   • coenzyme Q10 (CO Q-10) 200 mg, oral, Daily   • diazePAM (VALIUM) 2 mg tablet 1 hs prn anxiety   • Lactobac no.41-Bifidobact no.7 (PROBIOTIC-10) 70 mg (3 billion cell) capsule oral, Daily   • losartan (COZAAR) 50 mg, oral, Daily   • magnesium 250 mg, oral, 2 times daily   • MULTIVIT WITH MINERALS/LUTEIN (MULTIVITAMIN 50 PLUS ORAL) oral, Daily   • omega-3 fatty acids 1,000 mg capsule oral, Daily, Two capsules daily    • pantoprazole (PROTONIX) 40 mg, oral, Daily   • resveratroL 250 mg, oral, Daily   • zolpidem (AMBIEN) 5 mg, oral, Nightly PRN       SUPPLEMENTS:  Multivitamin, omega-3 fish oil, vitamin-D, Reservitol, methyl-B essentials, probiotic, coenzyme-Q10, lipoic acid, calcium.  We reviewed his supplements in detail today.    ALLERGIES:  No known drug allergies.    FAMILY HISTORY:  Mother with hypertension, congestive heart failure.  Father with myocardial infarction, hypertension.    SOCIAL HISTORY:  He is .  His wife's name is Lakeshia Presley.  He has one child.  He is an environmental  "consultant, self-employed.  He does not smoke.  Rare alcohol.  Regular exercise.    REVIEW OF SYSTEMS:  The patient has snoring, uses nasal CPAP, so he does have sleep apnea, GERD, reflux.  He has been on proton pump inhibitors for 15 years.  The remainder of his 12 point review of systems is negative.    PHYSICAL EXAMINATION:   Last is a 66-year-old male no acute distress    Vitals:    08/09/21 1406   BP: 118/72   Pulse: 71   Resp: 16   SpO2: 98%        Wt Readings from Last 3 Encounters:   08/09/21 72.2 kg (159 lb 1 oz)   07/26/21 73.9 kg (163 lb)   04/21/21 75.3 kg (166 lb)       HEENT:  Unremarkable.  Neck:  Supple, no JVD.  Lungs:  Clear to auscultation and percussion.  Cardiac:  Regular rate and rhythm.  Abdomen:  Soft, bowel sounds present, no organomegaly.  Extremities:  No edema, pulses intact.  Skin:  Warm and dry.  Neuro:  Alert and oriented X 3.    LABORATORY DATA:      EKG: Normal sinus rhythm    Coronary Calcium score 2016 at [place]: 174 predominantly involving the mid LAD.  Valvular     Cardiac catheterization 2016:  Mild to moderate non-obstructive LAD disease. FFR LAD negative.  Minor luminal irregularities LCX and RCA.  Normal LV function EF 60%.    Stress echo November 2019:  · Stress echocardiogram reveals discordant results with abnormal ecg but normal wall motion.  · Response to Stress: A horizontal ST segment depression of 1 mm was noted during stress in the V5 and V6 leads, beginning at 11 minutes 50 seconds of stress, and returning to baseline by 1 min of recovery.  · Post-stress echocardiogram does not meet criteria for ischemia. All ventricular walls become hyperdynamic and the ejection fraction improves.  ·   [facility] Advanced lipid panel April 2019 in \"media\":  Total cholesterol 153 LDL 72 HDL 77 trig, 30 APO B 72 LP(a) 64  Inflammation panel: Normal  Endothelial function panel: Normal  Diabetes panel: Normal  Metabolic panel: Normal.  Vitamin D 66 homocystine 11  Omega-3 index " 10.6  Sterol panel: Normal  Renal function panel: GFR 74    Basic Lipid Panels:  Component      Latest Ref Rng & Units 10/31/2019 1/27/2020 7/29/2021   Triglycerides      0 - 149 mg/dL 37 30 33   LDL Calculated      0 - 99 mg/dL 76 71 81   Cholesterol      100 - 199 mg/dL 162 159 167   VLDL Cholesterol Timmy      5 - 40 mg/dL  6 8   HDL      >39 mg/dL 75 82 78     IMPRESSIONS/RECOMMENDATIONS:  1. Coronary artery disease.  The patient has had prior cardiac catheterization which demonstrated nonobstructive LAD coronary artery disease.  His recent stress echo November 2019 demonstrated normal wall motion.  He did have borderline EKG changes at peak exercise. The patient needs an aggressive risk factor modification program.  He will continue baby aspirin.   2. Dyslipidemia.  Continue Lipitor.  He has reached goal.  3. 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  The patient is at increased risk for progressive atherosclerosis.  4. Prothrombin mutation.  The patient is at increased risk of DVT.  5. Hyperhomocystinemia.  The patient should continue with B-complex and L5 methyl folate.  6. Obstructive sleep apnea.  The patient will continue nasal CPAP.  7. Borderline hypertension.  Patient will monitor his blood pressures at home.  Our goal is a blood pressure less than 130/80.  His bp today is He continues on losartan 50 mg daily.  8. Anxiety disorder.  9. GERD and irritable bowel syndrome.  Stable    Summary: Dandy is demonstrating cardiovascular stability. .  We reviewed his prevention program in detail.  We are making no substantial changes today.     This was a 30-minute patient encounter with greater than 50% time spent in care coordination and counseling.      Sincerely,    DEV Mendoza    8/9/2021

## 2021-08-09 ENCOUNTER — OFFICE VISIT (OUTPATIENT)
Dept: CARDIOLOGY | Facility: CLINIC | Age: 66
End: 2021-08-09
Payer: MEDICARE

## 2021-08-09 VITALS
SYSTOLIC BLOOD PRESSURE: 118 MMHG | RESPIRATION RATE: 16 BRPM | OXYGEN SATURATION: 98 % | DIASTOLIC BLOOD PRESSURE: 72 MMHG | HEART RATE: 71 BPM | WEIGHT: 159.06 LBS | HEIGHT: 71 IN | BODY MASS INDEX: 22.27 KG/M2

## 2021-08-09 DIAGNOSIS — I10 ESSENTIAL HYPERTENSION: ICD-10-CM

## 2021-08-09 DIAGNOSIS — I25.10 CORONARY ARTERY DISEASE INVOLVING NATIVE CORONARY ARTERY OF NATIVE HEART WITHOUT ANGINA PECTORIS: ICD-10-CM

## 2021-08-09 DIAGNOSIS — E78.5 DYSLIPIDEMIA: Primary | ICD-10-CM

## 2021-08-09 PROCEDURE — G8754 DIAS BP LESS 90: HCPCS | Performed by: NURSE PRACTITIONER

## 2021-08-09 PROCEDURE — 93000 ELECTROCARDIOGRAM COMPLETE: CPT | Performed by: NURSE PRACTITIONER

## 2021-08-09 PROCEDURE — 99214 OFFICE O/P EST MOD 30 MIN: CPT | Performed by: NURSE PRACTITIONER

## 2021-08-09 PROCEDURE — G8752 SYS BP LESS 140: HCPCS | Performed by: NURSE PRACTITIONER

## 2021-11-24 ENCOUNTER — TRANSCRIBE ORDERS (OUTPATIENT)
Dept: SCHEDULING | Age: 66
End: 2021-11-24
Payer: MEDICARE

## 2021-11-24 DIAGNOSIS — Z98.890 OTHER SPECIFIED POSTPROCEDURAL STATES: Primary | ICD-10-CM

## 2021-11-24 DIAGNOSIS — R10.9 UNSPECIFIED ABDOMINAL PAIN: Primary | ICD-10-CM

## 2021-11-30 ENCOUNTER — APPOINTMENT (OUTPATIENT)
Dept: RADIOLOGY | Age: 66
End: 2021-11-30
Attending: PHYSICAL MEDICINE & REHABILITATION
Payer: MEDICARE

## 2021-11-30 VITALS — WEIGHT: 160 LBS | BODY MASS INDEX: 22.63 KG/M2

## 2021-11-30 DIAGNOSIS — Z98.890 OTHER SPECIFIED POSTPROCEDURAL STATES: ICD-10-CM

## 2021-11-30 PROCEDURE — A9585 GADOBUTROL INJECTION: HCPCS

## 2021-11-30 PROCEDURE — A9579 GAD-BASE MR CONTRAST NOS,1ML: HCPCS

## 2021-11-30 PROCEDURE — 72158 MRI LUMBAR SPINE W/O & W/DYE: CPT

## 2021-11-30 RX ORDER — GADOBUTROL 604.72 MG/ML
0.1 INJECTION INTRAVENOUS
Status: COMPLETED | OUTPATIENT
Start: 2021-11-30 | End: 2021-11-30

## 2021-11-30 RX ADMIN — GADOBUTROL 7.3 ML: 604.72 INJECTION INTRAVENOUS at 11:33

## 2021-12-21 ENCOUNTER — TELEPHONE (OUTPATIENT)
Dept: CARDIOLOGY | Facility: CLINIC | Age: 66
End: 2021-12-21
Payer: MEDICARE

## 2021-12-22 NOTE — TELEPHONE ENCOUNTER
Pt calling to re-schedule his Appt with Dr. Gustafson.     Pt is re-scheudled for 3/07/2022 @ 10am at [facility].     Pt had Lab Collected for [facility] and would like his results Mailed to him when received.     TY

## 2022-02-10 ENCOUNTER — OFFICE VISIT (OUTPATIENT)
Dept: FAMILY MEDICINE | Facility: CLINIC | Age: 67
End: 2022-02-10
Payer: MEDICARE

## 2022-02-10 VITALS
WEIGHT: 163 LBS | DIASTOLIC BLOOD PRESSURE: 62 MMHG | HEART RATE: 73 BPM | HEIGHT: 71 IN | BODY MASS INDEX: 22.82 KG/M2 | SYSTOLIC BLOOD PRESSURE: 130 MMHG | OXYGEN SATURATION: 98 % | TEMPERATURE: 98 F

## 2022-02-10 DIAGNOSIS — Z00.00 MEDICARE ANNUAL WELLNESS VISIT, SUBSEQUENT: Primary | ICD-10-CM

## 2022-02-10 DIAGNOSIS — Z23 NEED FOR VACCINATION: ICD-10-CM

## 2022-02-10 DIAGNOSIS — M80.00XS OSTEOPOROSIS WITH CURRENT PATHOLOGICAL FRACTURE, UNSPECIFIED OSTEOPOROSIS TYPE, SEQUELA: ICD-10-CM

## 2022-02-10 DIAGNOSIS — E55.9 VITAMIN D DEFICIENCY: ICD-10-CM

## 2022-02-10 DIAGNOSIS — E78.5 DYSLIPIDEMIA: ICD-10-CM

## 2022-02-10 DIAGNOSIS — K21.9 GASTROESOPHAGEAL REFLUX DISEASE WITHOUT ESOPHAGITIS: ICD-10-CM

## 2022-02-10 DIAGNOSIS — I25.10 CORONARY ARTERY DISEASE INVOLVING NATIVE CORONARY ARTERY OF NATIVE HEART WITHOUT ANGINA PECTORIS: ICD-10-CM

## 2022-02-10 DIAGNOSIS — G47.33 OSA (OBSTRUCTIVE SLEEP APNEA): ICD-10-CM

## 2022-02-10 DIAGNOSIS — I10 ESSENTIAL HYPERTENSION: ICD-10-CM

## 2022-02-10 PROBLEM — M81.0 OSTEOPOROSIS: Status: ACTIVE | Noted: 2022-02-10

## 2022-02-10 PROBLEM — Z13.6 SCREENING FOR AAA (ABDOMINAL AORTIC ANEURYSM): Status: RESOLVED | Noted: 2021-01-28 | Resolved: 2022-02-10

## 2022-02-10 PROCEDURE — G0438 PPPS, INITIAL VISIT: HCPCS | Mod: GC

## 2022-02-10 ASSESSMENT — MINI COG
COMPLETED: YES
TOTAL SCORE: 5

## 2022-02-10 ASSESSMENT — PATIENT HEALTH QUESTIONNAIRE - PHQ9: SUM OF ALL RESPONSES TO PHQ9 QUESTIONS 1 & 2: 0

## 2022-02-10 NOTE — ASSESSMENT & PLAN NOTE
DEXA 12/31/2021  The total bone mineral density of the lumbar spine from L1 to L4 measures 0.831 g/cm2 with a T-score of -2.4, which is in the range of osteopenia.  Since the last exam, there has been an interval increase of 4.8%, which is statistically significant,   however is due to degenerative changes, which falsely overestimates the true bone mineral density value.     The  total bone mineral density of the left hip measures 0.705g/cm2 with a T-score of -2.2, which is in the range of osteopenia.  Since the last exam, there has been an interval decrease of 5.5%, which is not statistically significant.     Left femoral neck shows a bone mineral density of 0.576 g/cm2 and T-score value of  -2.6, which is in the range of osteoporosis. Since the last exam, there has been an interval decrease of 1.2%, which is not statistically significant.    Pt declines use of bisphosphonates    - Pt on vitamin D and Ca supplements

## 2022-02-10 NOTE — ASSESSMENT & PLAN NOTE
Stable on current regimen. BP today 130/62    Continue taking losartan 50 mg daily.    Encouraged to consume a low sodium diet and perform regular aerobic and resistance exercise as tolerated with a goal of at least 150 minutes per week.    Recommended regular eye exams at least every other year.

## 2022-02-10 NOTE — PATIENT INSTRUCTIONS
It was a pleasure seeing you in the office today!     You are due for routine labs at this time. Please get these done at your earliest convenience.      Please continue to take your medications as prescribed.    If you have trouble remembering the names of your medications, please write down the name, dose, and how often you take your medications on a piece of paper and place it in your wallet, and bring it with you to every doctor's visit/hospital visit.     If you have any non-urgent questions or concerns, please feel free to reach out to me on the patient portal, and I will try to get back to you in a timely manner. For more urgent matters, please call the office, and I will get back to you as soon as I can.     Remember to eat a healthy diet with focus on vegetables, fruits, and lean meats. Aim for regular aerobic exercise (e.g. water aerobics/swimming, jogging, cycling) for at least 150 minutes per week to help protect your heart and 2 days/week of strengthening exercise to help strengthen your bones.                          Your Personalized Prevention Plan Services (PPPS)    Preventive Services Checklist (Assumes Average Risk Unless Otherwise Noted):    Health Maintenance Topics with due status: Overdue       Topic Date Due    Zoster Vaccine Never done    DTaP, Tdap, and Td Vaccines 09/02/2011     Health Maintenance Topics with due status: Not Due       Topic Last Completion Date    Colonoscopy 01/31/2017    COVID-19 Vaccine 10/24/2021    Medicare Annual Wellness Visit 02/10/2022     Health Maintenance Topics with due status: Completed       Topic Last Completion Date    Hepatitis C Screening 12/12/2016    Pneumococcal 11/28/2020    Pneumococcal (65 years and older) 11/28/2020    Influenza Vaccine 10/20/2021    Annual Falls Risk Screening 02/10/2022     Health Maintenance Topics with due status: Aged Out       Topic Date Due    Meningococcal ACWY Aged Out    HIB Vaccines Aged Out    IPV Vaccines Aged Out     HPV Vaccines Aged Out       You May Be Eligible for These Additional Preventive Services   (Assumes Average Risk Unless Otherwise Noted)  Diabetes Screening Any 1 risk factor: hypertension, dyslipidemia, obesity, high glucose; or Any 2 risk factors: >=64yo, overweight, family history diabetes (covered every 6 months)   Hepatitis C Screening Any 1 risk factor: 1) blood transfusion before 1992,   2) current or past injection drug use (annually for high risk; if born between 4261-5110, see above for status).   Vaccine: Hepatitis B As necessary if at-risk: hemophilia, ESRD, diabetes, living with individual infected with hep B, healthcare worker with frequent contact with blood/bodily fluids (series covered once)   Sexually Transmitted Diseases (STDs) As necessary chlamydia, gonorrhea, syphilis, hepatitis B (covered annually)  HIV if any 1 risk factor present: 1) <16yo or >64yo and at increased risk or 2) 15-64yo and ask for it (covered annually)   Lung Cancer Screening Low dose chest CT if all 3 risk factors: 1) 55-76yo, 2) smoker or quit within last 15y, 3) >=30 pack years (covered annually).  No results found for this or any previous visit.       Cholesterol Screening No signs or symptoms of cardiovascular disease (screening covered every 5 years).     Prostate Cancer Screening >= 49yo if patient desires test after weighting potential harms and benefits (covered annually)         Health Risk Factors with Personalized Education:  ----------------------------------------------------------------------------------------------------------------------  Stress management:  Understanding Your Stress  · Think about how you know when you’re stressed.  · Think about how your thoughts or behaviors are different when you’re stressed.  · Think about what triggers stress for you.  · Think about how you handle stress, and whether you’re making unhealthy choices in response to stress (like smoking, drinking alcohol or  overeating).  Managing Stress  · Take a break from the stressful situation.  · Reduce your stress levels by exercising, talking with family/friends, ensuring adequate sleep.  · Consider meditation or yoga.  · Make sure you plan time to do things you enjoy.  · Let your PCP know if you’re having problems limiting your stress.  ----------------------------------------------------------------------------------------------------------------------  Controlling Your Blood Pressure  · Maintain a normal weight (body mass index between 18.5 and 24.9).  · Eat more fruit, vegetables and low-fat dairy.  · Eat less saturated fat and total fat.  · Lower your sodium (salt) intake.  Try to stay under 1500 mg per day, but if you cannot get your intake to be that low, at least lower it by 1000 mg.  · Stay active.  Try to get at least 90 to 150 minutes of exercise per week.  Try brisk walking, swimming, bicycling or dancing.  · Limit alcohol intake.  When you do consume alcohol, drink no more than 2 drinks per day.  · If you have been prescribed medication, take it regularly and exactly as prescribed.  Let your PCP know if you have any problems or questions about your medication.  · Check your blood pressure at home or at the store.  Write down your readings and share them with your PCP  ----------------------------------------------------------------------------------------------------------------------  Controlling Your Cholesterol  · Reduce the amount of saturated and trans fat in your diet.  Limit intake of red meat.  Consume only low-fat or non-fat/skim dairy.  Limit fried food.  Cook with vegetable oils.  · Reduce your intake of sugary foods, sugary drinks and alcohol.  · Eat a diet high in fruit, vegetables and whole grains.  · Get protein from fish, poultry and a small portion of nuts.  · Stay active.  Try to get at least 90 to 150 minutes of exercise per week.  Try brisk walking, swimming, bicycling or dancing.  · Maintain a  healthy weight by balancing your diet and exercise.  · If you have been prescribed medication, take it regularly and exactly as prescribed. Let your PCP know if you have any problems or questions about your medication.  · It’s important to know your cholesterol numbers.  When recommended by your PCP, get the cholesterol blood test.  ---------------------------------------------------------------------------------------------------------------------   Controlling Your Osteoporosis, Strengthening Your Bones  · Try to get at least 90 to 150 minutes of weight-bearing exercise per week.  · Ensure intake of at least 1200mg of calcium per day.  Eat foods high in calcium like milk and other dairy, green vegetables, fruit, canned fish with soft and edible bones, nuts, calcium-set tofu.  Some foods are calcium-fortified, like bread, cereal, fruit juices and mineral water.  · Help your body make vitamin D by getting 10-15 minutes per day of sunlight.    · Ensure intake of at least 600IU of vitamin D per day.  Eat foods high in vitamin D like oily fish (salmon, sardines, mackerel) and eggs.  Some foods are fortified with vitamin D, like dairy and cereals.  · Avoid high amounts of caffeine and salt, since they can cause the body to loose calcium.  · Limit alcohol intake, since it is associated with weaker bones and is associated with falls and fractures.  · Limit intake of fizzy drinks.  · If you have been prescribed medication, take it regularly and exactly as prescribed.  Let your PCP know if you have any problems or questions about your medication  ----------------------------------------------------------------------------------------------------------------------  Reducing Your Risk of Falls  · Tell your PCP if any of your medications make you feel tired, dizzy, lightheaded or off-balance.  · Maintain coordination, flexibility and balance by ensuring regular physical activity.  · Limit alcohol intake to 1 drink per day.   Consider avoiding all alcohol intake.  · Ensure good vision.  Visit an ophthalmologist or optometrist regularly for vision screening or to make sure your glasses / contact lens prescription is correct.  If you need glasses or contacts, wear them.  When you get new glasses or contacts, take time to get used to them.  Do not wear sunglasses or tinted lenses when indoors.  · Ensure good hearing.  Have your hearing checked if you are having trouble hearing, or family and friends think you cannot hear them.  If you need a hearing aid, be sure it fits well and wear it.  · Get enough rest.  Ensure about 7-9 hours of sleep every day.  · Get up slowly from your bed or chairs.  Do not start walking until you are sure you feel steady.  · Wear non-skid, rubber-soled, low-heeled shoes.  Do not walk in socks, or in shoes and slippers with smooth soles.  · If your PCP or therapist recommends using a cane or walker, use it regularly.  · Make your home safer.  Increase lighting throughout the house, especially at the top and bottom of stairs.  Ensure lighting is easily turned on when getting up in the middle of the night.  Make sure there are two secure rails on all stairs.  Install grab bars in the bathtub / shower and near the toilet.  Consider using a shower chair and / or a hand-held shower.  · Spread sand or salt on icy surfaces.  Beware of wet surfaces, which can be icy.  · Tell your PCP if you have fallen.

## 2022-02-10 NOTE — ASSESSMENT & PLAN NOTE
Stable on current regimen.    Continue taking atorvastatin 10 mg nightly.    Encouraged to consume a calorie appropriate diet of high quality low fat, low cholesterol, low carb foods and perform regular aerobic and resistance exercise as tolerated with a goal of at least 150 minutes per week.

## 2022-02-10 NOTE — PROGRESS NOTES
Subjective     Dandy Lomax is a 66 y.o. male who presents for an initial annual wellness visit.     Patient Care Team:  Carito Adams DO as PCP - General (Family Medicine)  Harman Cr MD as Referring Physician (Urology)    Comprehensive Medical and Social History  Patient Active Problem List   Diagnosis   • Coronary artery disease   • Dyslipidemia   • Prothrombin mutation (CMS/HCC)   • Hyperhomocystinemia (CMS/HCC)   • MERY (obstructive sleep apnea)   • Essential hypertension   • Anxiety disorder   • Gastroesophageal reflux disease   • Varicose veins of both lower extremities   • Trigger ring finger of right hand   • Knee pain, bilateral   • Insomnia   • Cyclic neutropenia (CMS/HCC)   • Chest tightness   • Cough   • Allergic rhinitis   • Dyspnea   • Bruising   • DDD (degenerative disc disease), lumbar   • Low testosterone   • Constipation   • Lumbar stenosis without neurogenic claudication   • Lumbar radiculopathy   • Lumbar facet arthropathy   • Medicare annual wellness visit, initial   • Vitamin D deficiency   • Asthma   • History of prostatitis   • Dermatitis   • Immunization due   • S/P epidural steroid injection   • Salivary stones   • Urge incontinence   • Nocturia   • Idiopathic peripheral neuropathy   • Acute diarrhea   • Abdominal cramping   • Myalgia   • Weakness   • Gastroenteritis   • Encounter for preventive health examination   • Benign prostatic hyperplasia   • Medicare annual wellness visit, subsequent   • Dry skin dermatitis   • Hx of nonmelanoma skin cancer   • Advance directive on file   • Left knee pain   • Hemorrhoids   • Hernia, hiatal   • Osteoporosis     Past Medical History:   Diagnosis Date   • Abnormal ECG     RBBB   • Allergic rhinitis 10/1/2019   • Anxiety    • Coronary artery disease     Coronary artery disease, cardiac catheterization in November 2016, moderate stenosis involving the mid LAD, FFR negative   • DDD (degenerative disc disease), lumbar 10/1/2019   •  Dyslipidemia 4/20/2018   • Essential hypertension 4/20/2018   • GERD (gastroesophageal reflux disease)    • Hyperhomocysteinemia (CMS/HCC)    • Hyperhomocystinemia (CMS/HCC) 4/20/2018   • Hypertension    • Idiopathic peripheral neuropathy 10/26/2020   • Lipid disorder    • Obstructive sleep apnea    • Prothrombin mutation (CMS/HCC) 4/20/2018   • Varicose veins of legs 6/8/2017    Last Assessment & Plan:  Right lower leg VV  Ref Dr Wendy Quiroz at [facility]   • Vitamin D deficiency 1/10/2020     Past Surgical History:   Procedure Laterality Date   • CATARACT EXTRACTION     • COLONOSCOPY     • ESOPHAGOGASTRODUODENOSCOPY     • HAND TENDON SURGERY Right 06/2021    thumb   • LUMBAR LAMINECTOMY       No Known Allergies  Current Outpatient Medications   Medication Sig Dispense Refill   • alpha lipoic acid 200 mg capsule Take 200 mg by mouth once daily.     • aspirin (ASPIR-81) 81 mg enteric coated tablet Take 81 mg by mouth once daily.     • atorvastatin (LIPITOR) 10 mg tablet Take 1 tablet (10 mg total) by mouth daily. 90 tablet 3   • cholecalciferol, vitamin D3, (VITAMIN D3) 2,000 unit tablet Take by mouth daily.     • coenzyme Q10 (CO Q-10) 200 mg capsule Take 200 mg by mouth once daily.     • diazePAM (VALIUM) 2 mg tablet 1 hs prn anxiety 20 tablet 0   • Lactobac no.41-Bifidobact no.7 (PROBIOTIC-10) 70 mg (3 billion cell) capsule Take by mouth daily.     • losartan (COZAAR) 50 mg tablet Take 1 tablet (50 mg total) by mouth daily. 90 tablet 3   • magnesium 250 mg tablet Take 250 mg by mouth 2 (two) times a day.       • MULTIVIT WITH MINERALS/LUTEIN (MULTIVITAMIN 50 PLUS ORAL) Take by mouth once daily.     • omega-3 fatty acids 1,000 mg capsule Take by mouth once daily. Two capsules daily     • pantoprazole (PROTONIX) 40 mg EC tablet Take 40 mg by mouth daily.       • resveratrol 250 mg capsule Take 250 mg by mouth daily.       • zolpidem (AMBIEN) 5 mg tablet Take 1 tablet (5 mg total) by mouth nightly as needed  "for sleep. 30 tablet 0     No current facility-administered medications for this visit.     Social History     Tobacco Use   • Smoking status: Never Smoker   • Smokeless tobacco: Never Used   Vaping Use   • Vaping Use: Never used   Substance Use Topics   • Alcohol use: Yes     Comment: Rare alcohol   • Drug use: No     Family History   Problem Relation Age of Onset   • Hypertension Biological Mother    • Heart failure Biological Mother    • Uterine cancer Biological Mother    • Heart attack Biological Father    • Hypertension Biological Father    • COPD Biological Father    • No Known Problems Biological Sister        Objective   Vitals  Vitals:    02/10/22 1055   BP: 130/62   Pulse: 73   Temp: 36.7 °C (98 °F)   SpO2: 98%   Weight: 73.9 kg (163 lb)   Height: 1.803 m (5' 11\")     Body mass index is 22.73 kg/m².  A1c 5.7 - 11/2021    Advanced Care Plan  Does patient have advance directive?: Yes                                     PHQ  Will the patient answer the depression questions?: Yes   Little interest or pleasure in doing things: Not at all   Feeling down, depressed, or hopeless: Not at all   Depression Risk: 0                                             Mini Cog  Completed: Yes  Score: 5  Result: Negative      Get Up and Go  Result: Pass    STEADI Falls Risk  One or more falls in the last year: No           Has trouble stepping up onto a curb: No   Advised to use a cane or walker to get around safely: No   Often has to rush to the toilet: Yes   Feels unsteady when walking: No   Has lost some feeling in feet: No   Often feels sad or depressed: No   Steadies self on furniture while walking at home: No   Takes medication that makes him/her feel lightheaded or more tired than usual: No   Worried about falling: No   Takes medicine to sleep or improve mood: No   Needs to push with hands when rising from a chair: No   Falls screen completed: Yes     Hearing and Vision Screening  No exam data present  See HRA for " relevant hearing screening response.    Assessment/Plan   Diagnoses and all orders for this visit:    Medicare annual wellness visit, subsequent (Primary)  Assessment & Plan:  Routine labs ordered.    Encouraged to exercise with a goal of 150 minutes of aerobic activity per week with 2 sessions of weight training weekly.     Advised to eat a healthy, high quality, low carb, low sugar diet.     Complete regular dental examinations as well as brushing and flossing daily.    Continue enhanced safety by wearing seat belts, checking smoke and CO detectors.    Orders:  -     CBC; Future  -     Basic metabolic panel; Future    Dyslipidemia  Assessment & Plan:  Stable on current regimen.    Continue taking atorvastatin 10 mg nightly.    Encouraged to consume a calorie appropriate diet of high quality low fat, low cholesterol, low carb foods and perform regular aerobic and resistance exercise as tolerated with a goal of at least 150 minutes per week.      Vitamin D deficiency  Assessment & Plan:  Stable on current regimen.    Continue taking Vitamin D 2,000 IU.      Gastroesophageal reflux disease without esophagitis  Assessment & Plan:  Stable on current regimen.    Continue taking pantoprazole 40 mg daily.      Essential hypertension  Assessment & Plan:  Stable on current regimen. BP today 130/62    Continue taking losartan 50 mg daily.    Encouraged to consume a low sodium diet and perform regular aerobic and resistance exercise as tolerated with a goal of at least 150 minutes per week.    Recommended regular eye exams at least every other year.      MERY (obstructive sleep apnea)  Assessment & Plan:  Stable on current regimen.    Continue nightly CPAP use.    Continue regular follow-up with pulmonology.      Coronary artery disease involving native coronary artery of native heart without angina pectoris  Assessment & Plan:  Stable on current regimen.    Continue taking aspirin, losartan, and atorvastatin.      Need for  vaccination  Comments:  Td    Osteoporosis with current pathological fracture, unspecified osteoporosis type, sequela  Assessment & Plan:  DEXA 12/31/2021  The total bone mineral density of the lumbar spine from L1 to L4 measures 0.831 g/cm2 with a T-score of -2.4, which is in the range of osteopenia.  Since the last exam, there has been an interval increase of 4.8%, which is statistically significant,   however is due to degenerative changes, which falsely overestimates the true bone mineral density value.     The  total bone mineral density of the left hip measures 0.705g/cm2 with a T-score of -2.2, which is in the range of osteopenia.  Since the last exam, there has been an interval decrease of 5.5%, which is not statistically significant.     Left femoral neck shows a bone mineral density of 0.576 g/cm2 and T-score value of  -2.6, which is in the range of osteoporosis. Since the last exam, there has been an interval decrease of 1.2%, which is not statistically significant.    Pt declines use of bisphosphonates    - Pt on vitamin D and Ca supplements         See Patient Instructions (the written plan) which was given to the patient for PPPS and health risk factors with interventions.

## 2022-02-10 NOTE — ASSESSMENT & PLAN NOTE
Stable on current regimen.    Continue nightly CPAP use.    Continue regular follow-up with pulmonology.

## 2022-02-10 NOTE — ASSESSMENT & PLAN NOTE
Routine labs ordered.    Encouraged to exercise with a goal of 150 minutes of aerobic activity per week with 2 sessions of weight training weekly.     Advised to eat a healthy, high quality, low carb, low sugar diet.     Complete regular dental examinations as well as brushing and flossing daily.    Continue enhanced safety by wearing seat belts, checking smoke and CO detectors.

## 2022-02-17 LAB
BUN SERPL-MCNC: 21 MG/DL (ref 8–27)
BUN/CREAT SERPL: 21 (ref 10–24)
CALCIUM SERPL-MCNC: 9.1 MG/DL (ref 8.6–10.2)
CHLORIDE SERPL-SCNC: 101 MMOL/L (ref 96–106)
CO2 SERPL-SCNC: 26 MMOL/L (ref 20–29)
CREAT SERPL-MCNC: 1.01 MG/DL (ref 0.76–1.27)
ERYTHROCYTE [DISTWIDTH] IN BLOOD BY AUTOMATED COUNT: 13.1 % (ref 11.6–15.4)
GLUCOSE SERPL-MCNC: 99 MG/DL (ref 65–99)
HCT VFR BLD AUTO: 41.8 % (ref 37.5–51)
HGB BLD-MCNC: 14.1 G/DL (ref 13–17.7)
LAB CORP EGFR IF AFRICN AM: 89 ML/MIN/1.73
LAB CORP EGFR IF NONAFRICN AM: 77 ML/MIN/1.73
MCH RBC QN AUTO: 29.5 PG (ref 26.6–33)
MCHC RBC AUTO-ENTMCNC: 33.7 G/DL (ref 31.5–35.7)
MCV RBC AUTO: 87 FL (ref 79–97)
PLATELET # BLD AUTO: 201 X10E3/UL (ref 150–450)
POTASSIUM SERPL-SCNC: 4.5 MMOL/L (ref 3.5–5.2)
RBC # BLD AUTO: 4.78 X10E6/UL (ref 4.14–5.8)
SODIUM SERPL-SCNC: 141 MMOL/L (ref 134–144)
WBC # BLD AUTO: 5.4 X10E3/UL (ref 3.4–10.8)

## 2022-03-07 ENCOUNTER — OFFICE VISIT (OUTPATIENT)
Dept: CARDIOLOGY | Facility: CLINIC | Age: 67
End: 2022-03-07
Payer: MEDICARE

## 2022-03-07 ENCOUNTER — TELEPHONE (OUTPATIENT)
Dept: SCHEDULING | Facility: CLINIC | Age: 67
End: 2022-03-07
Payer: MEDICARE

## 2022-03-07 VITALS
OXYGEN SATURATION: 98 % | BODY MASS INDEX: 22.68 KG/M2 | DIASTOLIC BLOOD PRESSURE: 72 MMHG | WEIGHT: 162 LBS | HEIGHT: 71 IN | SYSTOLIC BLOOD PRESSURE: 118 MMHG | HEART RATE: 76 BPM

## 2022-03-07 DIAGNOSIS — R94.31 ABNORMAL ECG: ICD-10-CM

## 2022-03-07 DIAGNOSIS — I10 ESSENTIAL HYPERTENSION: ICD-10-CM

## 2022-03-07 DIAGNOSIS — I25.10 CORONARY ARTERY DISEASE INVOLVING NATIVE CORONARY ARTERY OF NATIVE HEART WITHOUT ANGINA PECTORIS: Primary | ICD-10-CM

## 2022-03-07 DIAGNOSIS — E78.5 DYSLIPIDEMIA: ICD-10-CM

## 2022-03-07 PROBLEM — Z00.00 MEDICARE ANNUAL WELLNESS VISIT, SUBSEQUENT: Status: RESOLVED | Noted: 2021-01-28 | Resolved: 2022-03-07

## 2022-03-07 PROBLEM — Z23 IMMUNIZATION DUE: Status: RESOLVED | Noted: 2020-10-26 | Resolved: 2022-03-07

## 2022-03-07 PROBLEM — Z00.00 ENCOUNTER FOR PREVENTIVE HEALTH EXAMINATION: Status: RESOLVED | Noted: 2021-01-27 | Resolved: 2022-03-07

## 2022-03-07 PROBLEM — Z00.00 MEDICARE ANNUAL WELLNESS VISIT, INITIAL: Status: RESOLVED | Noted: 2020-01-10 | Resolved: 2022-03-07

## 2022-03-07 PROBLEM — R07.89 CHEST TIGHTNESS: Status: RESOLVED | Noted: 2019-10-01 | Resolved: 2022-03-07

## 2022-03-07 PROBLEM — R06.00 DYSPNEA: Status: RESOLVED | Noted: 2019-10-01 | Resolved: 2022-03-07

## 2022-03-07 PROBLEM — R05.9 COUGH: Status: RESOLVED | Noted: 2019-10-01 | Resolved: 2022-03-07

## 2022-03-07 PROCEDURE — G8754 DIAS BP LESS 90: HCPCS | Performed by: INTERNAL MEDICINE

## 2022-03-07 PROCEDURE — 99214 OFFICE O/P EST MOD 30 MIN: CPT | Performed by: INTERNAL MEDICINE

## 2022-03-07 PROCEDURE — G8752 SYS BP LESS 140: HCPCS | Performed by: INTERNAL MEDICINE

## 2022-03-07 PROCEDURE — 93000 ELECTROCARDIOGRAM COMPLETE: CPT | Performed by: INTERNAL MEDICINE

## 2022-03-07 RX ORDER — ESCITALOPRAM OXALATE 10 MG/1
10 TABLET ORAL DAILY
COMMUNITY
Start: 2021-07-01 | End: 2022-03-08 | Stop reason: SDUPTHER

## 2022-03-07 NOTE — LETTER
2022     Carito Adams, DO  [address]  [facility] [address]    Patient: Hilda Lomax  YOB: 1955  Date of Visit: 3/7/2022      Dear Dr. Adams:    Thank you for referring Hilda Lomax to me for evaluation. Below are my notes for this consultation.    If you have questions, please do not hesitate to call me. I look forward to following your patient along with you.         Sincerely,        Nnamdi Gustafson MD        CC: No Recipients  Nnamdi Gustafson MD  3/7/2022 11:01 AM  Sign when Signing Visit    [facility]    3/7/2022      Re:  HILDA LOMAX  :  1955    Dear Sami:    It was my pleasure to see your patient, Hilda Lomax, in the [facility] today.      As you know, we follow him for subclinical coronary artery disease and dyslipidemia.     The patient has been through a previous cardiac workup by Dr. Santo Tucker at [facility].  A coronary calcium score was obtained in  which revealed Agatston score 174 with significant plaquing of his LAD.  This prompted a stress study which was equivocal for ischemia.  This was followed by a diagnostic left heart catheterization in 2016 which demonstrated moderate nonobstructive Mid LAD disease with negative FFR.  Medical management was indicated.    The patient has been on a prevention program over the past several years.  He is on Lipitor 10 mg daily.  His recent lipid panel dated 2021 revealed a total cholesterol of 167, triglycerides 33, HDL 78, LDL 81.  This is an excellent response to Lipitor 10 mg daily.    On his advanced lipid panel in  his bioinflammatory markers including HSCRP and LPPLA-2 were normal.  His endothelial function was normal.  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  He has a prothrombin mutation which increases his risk for venous thrombosis.  His homocystine level is elevated at  11.  There was no evidence of insulin resistance.  His ferritin levels are low.  His omega-3 index was10.9.    We reviewed his recent [facility] advanced lipid panel as well as his medications and supplements in detail and are making no substantial changes today.     The patient reports he had recent laminectomy which has decreased his physical activity tolerance lately, though he still tries to exercise 3 or 4 times a week. He reports eating lots of fruits, vegetables, nuts, and flaxseed oil.    His EKG today demonstrates an ECG finding of right BBB this visit.  In reviewing the records he had an incomplete right bundle branch block in 2018 and then a complete right bundle branch block in 2019.  He underwent a stress echo which was negative for microischemia or scar.  In 2020 his EKG reverted back to normal conduction pattern.  We discussed this finding in detail, it is not cause for immediate concern as he is asymptomatic. We will get a repeat ECHO to confirm cardiac function.    PAST MEDICAL HISTORY:    Past Medical History:   Diagnosis Date   • Abnormal ECG     RBBB   • Allergic rhinitis 10/1/2019   • Anxiety    • Coronary artery disease     Coronary artery disease, cardiac catheterization in November 2016, moderate stenosis involving the mid LAD, FFR negative   • DDD (degenerative disc disease), lumbar 10/1/2019   • Dyslipidemia 4/20/2018   • Essential hypertension 4/20/2018   • GERD (gastroesophageal reflux disease)    • Hyperhomocysteinemia (CMS/HCC)    • Hyperhomocystinemia (CMS/HCC) 4/20/2018   • Hypertension    • Idiopathic peripheral neuropathy 10/26/2020   • Lipid disorder    • Obstructive sleep apnea    • Prothrombin mutation (CMS/HCC) 4/20/2018   • Varicose veins of legs 6/8/2017    Last Assessment & Plan:  Right lower leg VV  Ref Dr Wendy Quiroz at [facility]   • Vitamin D deficiency 1/10/2020       PAST SURGICAL HISTORY:  Retinal tear, prostate biopsy, hernia repair.    CURRENT MEDICATIONS:    Current Outpatient Medications   Medication Instructions   • alpha lipoic acid 200 mg, oral, Daily   • aspirin (ASPIR-81) 81 mg, oral, Daily   • atorvastatin (LIPITOR) 10 mg, oral, Daily   • cholecalciferol, vitamin D3, (VITAMIN D3) 2,000 unit tablet oral, Daily   • coenzyme Q10 (CO Q-10) 200 mg, oral, Daily   • escitalopram (LEXAPRO) 10 mg, oral, Daily   • Lactobac no.41-Bifidobact no.7 (PROBIOTIC-10) 70 mg (3 billion cell) capsule oral, Daily   • losartan (COZAAR) 50 mg, oral, Daily   • magnesium 250 mg, oral, 2 times daily   • MULTIVIT WITH MINERALS/LUTEIN (MULTIVITAMIN 50 PLUS ORAL) oral, Daily   • omega-3 fatty acids 1,000 mg capsule oral, Daily, Two capsules daily    • pantoprazole (PROTONIX) 40 mg, oral, Daily   • resveratroL 250 mg, oral, Daily   • zolpidem (AMBIEN) 5 mg, oral, Nightly PRN       SUPPLEMENTS:  Multivitamin, omega-3 fish oil, vitamin-D, Reservitol, methyl-B essentials, probiotic, coenzyme-Q10, lipoic acid, calcium.  We reviewed his supplements in detail today.    ALLERGIES:  No known drug allergies.    FAMILY HISTORY:  Mother with hypertension, congestive heart failure.  Father with myocardial infarction, hypertension.    SOCIAL HISTORY:  He is .  His wife's name is Lakeshia Presley.  He has one child.  He is an , self-employed.  He does not smoke.  Rare alcohol.  Regular exercise.    REVIEW OF SYSTEMS:  The patient has snoring, uses nasal CPAP, so he does have sleep apnea, GERD, reflux.  He has been on proton pump inhibitors for 15 years.  The remainder of his 12 point review of systems is negative.    PHYSICAL EXAMINATION:   Last is a 66-year-old male no acute distress    Vitals:    03/07/22 0958   BP: 118/72   Pulse: 76   SpO2: 98%        Wt Readings from Last 3 Encounters:   03/07/22 73.5 kg (162 lb)   02/10/22 73.9 kg (163 lb)   11/30/21 72.6 kg (160 lb)       HEENT:  Unremarkable.  Neck:  Supple, no JVD.  Lungs:  Clear to auscultation and  "percussion.  Cardiac:  Regular rate and rhythm.  Abdomen:  Soft, bowel sounds present, no organomegaly.  Extremities:  No edema, pulses intact.  Skin:  Warm and dry.  Neuro:  Alert and oriented X 3.    LABORATORY DATA:      EKG: Normal sinus rhythm with a right bundle branch block.  The patient has had intermittent right bundle branch block in the past.    Coronary Calcium score 2016 at [place]: 174 predominantly involving the mid LAD.       Cardiac catheterization 2016:  Mild to moderate non-obstructive LAD disease. FFR LAD negative.  Minor luminal irregularities LCX and RCA.  Normal LV function EF 60%.    Stress echo November 2019:  · Stress echocardiogram reveals discordant results with borderline abnormal ecg but normal wall motion.  · Response to Stress: A horizontal ST segment depression of 1 mm was noted during stress in the V5 and V6 leads, beginning at 11 minutes 50 seconds of stress, and returning to baseline by 1 min of recovery.  · Post-stress echocardiogram does not meet criteria for ischemia. All ventricular walls become hyperdynamic and the ejection fraction improves.    [facility] lipid panel April 2019 in \"media\":  Total cholesterol 153 LDL 72 HDL 77 trig, 30 APO B 72 LP(a) 64  Inflammation panel: Normal  Endothelial function panel: Normal  Diabetes panel: Normal  Metabolic panel: Normal.  Vitamin D 66 homocystine 11  Omega-3 index 10.6  Sterol panel: Normal  Renal function panel: GFR 74    Basic Lipid Panels:  Component      Latest Ref Rng & Units 10/31/2019 1/27/2020 7/29/2021   Triglycerides      0 - 149 mg/dL 37 30 33   LDL Calculated      0 - 99 mg/dL 76 71 81   Cholesterol      100 - 199 mg/dL 162 159 167   VLDL Cholesterol Itmmy      5 - 40 mg/dL  6 8   HDL      >39 mg/dL 75 82 78     [facility]  12/21/21:  Lipid panel: Total cholesterol 162, LDL 69, HDL 76, triglycerides 31. ApoB 60, Lp(a) 25.  Inflammation panel: HS CRP 0.4 LP MIGUEL ÁNGEL 2 129  Sterol balance panel: Hyper absorber of " cholesterol  Diabetes panel: Normal hemoglobin A1c 5.5 insulin 6 Kacy IR 1.6 LP IR 13  Fatty acid panel: EPA 68 DHA 80 mcg/mL  Metabolic panel: Uric acid 5.2 homocysteine 10.4  Hormone panel: TSH 3.6 vitamin D 63    IMPRESSIONS/RECOMMENDATIONS:  1. Coronary artery disease.  The patient has had prior cardiac catheterization which demonstrated nonobstructive LAD coronary artery disease in 2016.  His recent stress echo November 2019 demonstrated normal wall motion.  He did have borderline EKG changes at peak exercise. The patient needs an aggressive risk factor modification program.  He will continue baby aspirin.   2. Dyslipidemia.  Continue Lipitor.  He has reached goal.  3. 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  The patient is at increased risk for progressive atherosclerosis.  4. Prothrombin mutation.  The patient is at increased risk of DVT.  5. Hyperhomocystinemia.  The patient should continue with B-complex and L5 methyl folate.  6. Obstructive sleep apnea.  The patient will continue nasal CPAP.  7. Borderline hypertension.  Patient will monitor his blood pressures at home.  Our goal is a blood pressure less than 130/80.  His bp today is He continues on losartan 50 mg daily.  8. Anxiety disorder.  9. GERD and irritable bowel syndrome.  Stable  10.        New right bundle branch block.  The patient has demonstrated an incomplete right bundle branch block since 2018.  He had a complete right bundle branch block in 2019.  This prompted a stress echo which was negative for ischemia in 2019.  His EKG in 2020 reverted to normal sinus rhythm without bundle branch block.  We recommend that he undergo a repeat echocardiogram at this time.    Summary: Dandy is demonstrating cardiovascular stability. .  We reviewed his prevention program in detail.  We are making no substantial changes today.    We reviewed his EKGs dating back to 2016.  He had incomplete right bundle branch block in 2018.  He subsequently developed complete bundle right  bundle branch block in 2019.  A stress echo in 2019 was negative for ischemia.  In 2020 he reverted back to normal conduction.  Today his EKG demonstrates right bundle branch block.  I suspect that he is having intermittent right bundle branch block.  We will obtain an echocardiogram.    This was a 30-minute patient encounter with greater than 50% time spent in care coordination and counseling.      Sincerely,    Nnamdi Gustafson MD    3/7/2022

## 2022-03-07 NOTE — TELEPHONE ENCOUNTER
Dr. FORD recommended Ian Phillip. I called the patient and left a message with his information and how to schedule and appointment.

## 2022-03-07 NOTE — TELEPHONE ENCOUNTER
Pt will like to know the GP that was recommended for him after his visit    Pt can be reach at [phone]

## 2022-03-08 RX ORDER — ESCITALOPRAM OXALATE 10 MG/1
10 TABLET ORAL DAILY
Qty: 90 TABLET | Refills: 0 | Status: SHIPPED | OUTPATIENT
Start: 2022-03-08 | End: 2022-03-11 | Stop reason: SDUPTHER

## 2022-03-08 NOTE — TELEPHONE ENCOUNTER
Patient called said he would like a refill of his Lexapro 10 mg medication to be sent to the  [facility] on file.

## 2022-03-10 ENCOUNTER — TELEPHONE (OUTPATIENT)
Dept: FAMILY MEDICINE | Facility: CLINIC | Age: 67
End: 2022-03-10

## 2022-03-10 NOTE — TELEPHONE ENCOUNTER
Patient called would like refill of his Lexapro medication to be sent to the [facility] on [address] .

## 2022-03-11 ENCOUNTER — TELEPHONE (OUTPATIENT)
Dept: FAMILY MEDICINE | Facility: CLINIC | Age: 67
End: 2022-03-11
Payer: MEDICARE

## 2022-03-11 RX ORDER — ESCITALOPRAM OXALATE 10 MG/1
10 TABLET ORAL DAILY
Qty: 90 TABLET | Refills: 0 | Status: SHIPPED | OUTPATIENT
Start: 2022-03-11 | End: 2022-03-14 | Stop reason: SDUPTHER

## 2022-03-11 NOTE — TELEPHONE ENCOUNTER
Patient called stating [facility] is on vacation.    Can you resend Lexapro 10 mg to [facility] [address] listed in chart.

## 2022-03-14 ENCOUNTER — HOSPITAL ENCOUNTER (OUTPATIENT)
Dept: CARDIOLOGY | Facility: CLINIC | Age: 67
Discharge: HOME | End: 2022-03-14
Attending: INTERNAL MEDICINE
Payer: MEDICARE

## 2022-03-14 VITALS
WEIGHT: 160 LBS | HEIGHT: 71 IN | SYSTOLIC BLOOD PRESSURE: 144 MMHG | BODY MASS INDEX: 22.4 KG/M2 | DIASTOLIC BLOOD PRESSURE: 79 MMHG

## 2022-03-14 DIAGNOSIS — R94.31 ABNORMAL ECG: ICD-10-CM

## 2022-03-14 DIAGNOSIS — I25.10 CORONARY ARTERY DISEASE INVOLVING NATIVE CORONARY ARTERY OF NATIVE HEART WITHOUT ANGINA PECTORIS: ICD-10-CM

## 2022-03-14 DIAGNOSIS — E78.5 DYSLIPIDEMIA: ICD-10-CM

## 2022-03-14 PROCEDURE — 93306 TTE W/DOPPLER COMPLETE: CPT | Mod: 26 | Performed by: INTERNAL MEDICINE

## 2022-03-14 PROCEDURE — 93306 TTE W/DOPPLER COMPLETE: CPT

## 2022-03-14 RX ORDER — ESCITALOPRAM OXALATE 10 MG/1
10 TABLET ORAL DAILY
Qty: 90 TABLET | Refills: 0 | Status: SHIPPED | OUTPATIENT
Start: 2022-03-14 | End: 2022-06-24

## 2022-03-14 NOTE — PROGRESS NOTES
Patient requested Lexapro refill to be sent to [facility] pharmacy and not [facility].  Prescription was resent to the right pharmacy.

## 2022-03-15 ENCOUNTER — TELEPHONE (OUTPATIENT)
Dept: SCHEDULING | Facility: CLINIC | Age: 67
End: 2022-03-15
Payer: MEDICARE

## 2022-03-15 LAB
AORTIC ROOT ANNULUS: 3.4 CM
AORTIC VALVE MEAN VELOCITY: 0.92 M/S
AORTIC VALVE VELOCITY TIME INTEGRAL: 28.3 CM
AV MEAN GRADIENT: 4 MMHG
AV PEAK GRADIENT: 9 MMHG
AV PEAK VELOCITY-S: 1.49 M/S
AV VALVE AREA: 2.79 CM2
BSA FOR ECHO PROCEDURE: 1.91 M2
DOP CALC LVOT STROKE VOLUME: 79.03 ML
E WAVE DECELERATION TIME: 308 MS
E/A RATIO: 1
E/E' RATIO: 9.2
E/LAT E' RATIO: 8.8
EDV (BP): 87.1 CM3
EF (A4C): 50.6 %
EF A2C: 53.2 %
EJECTION FRACTION: 50.6 %
ESV (BP): 43 CM3
FRACTIONAL SHORTENING: 26.1 %
INTERVENTRICULAR SEPTUM: 1.25 CM
IVC-EXP: 2.56 CM
IVC-INS: 1.26 CM
LA ESV (BP): 76.4 CM3
LA ESV INDEX (A2C): 39.95 CM3/M2
LA ESV INDEX (BP): 40 CM3/M2
LA/AORTA RATIO: 1
LAAS-AP2: 22.8 CM2
LAAS-AP4: 22.2 CM2
LAD 2D: 3.4 CM
LALD A4C: 5.23 CM
LALD A4C: 5.57 CM
LAV-S: 76.3 CM3
LEFT ATRIUM VOLUME INDEX: 38.01 CM3/M2
LEFT ATRIUM VOLUME: 72.6 CM3
LEFT INTERNAL DIMENSION IN SYSTOLE: 3.6 CM (ref 2.71–4.11)
LEFT VENTRICLE DIASTOLIC VOLUME INDEX: 48.95 CM3/M2
LEFT VENTRICLE DIASTOLIC VOLUME: 93.5 CM3
LEFT VENTRICLE SYSTOLIC VOLUME INDEX: 24.14 CM3/M2
LEFT VENTRICLE SYSTOLIC VOLUME: 46.1 CM3
LEFT VENTRICULAR INTERNAL DIMENSION IN DIASTOLE: 4.87 CM (ref 4.6–6.39)
LEFT VENTRICULAR POSTERIOR WALL IN END DIASTOLE: 1.02 CM (ref 0.6–1.12)
LV DIASTOLIC VOLUME: 79.9 CM3
LV ESV (APICAL 2 CHAMBER): 37.3 CM3
LVAD-AP2: 27.8 CM2
LVAD-AP4: 29.6 CM2
LVAS-AP2: 17.1 CM2
LVAS-AP4: 18.4 CM2
LVEDVI(A2C): 41.83 CM3/M2
LVEDVI(BP): 45.6 CM3/M2
LVESVI(A2C): 19.53 CM3/M2
LVESVI(BP): 22.51 CM3/M2
LVLD-AP2: 8.11 CM
LVLD-AP4: 7.94 CM
LVLS-AP2: 6.83 CM
LVLS-AP4: 6.32 CM
LVOT 2D: 2.2 CM
LVOT A: 3.8 CM2
LVOT MG: 2 MMHG
LVOT MV: 0.65 M/S
LVOT PEAK VELOCITY: 1.08 M/S
LVOT VTI: 20.8 CM
MV E'TISSUE VEL-LAT: 0.08 M/S
MV E'TISSUE VEL-MED: 0.07 M/S
MV PEAK A VEL: 0.64 M/S
MV PEAK E VEL: 0.66 M/S
MV VALVE AREA P 1/2 METHOD: 2.44 CM2
POSTERIOR WALL: 1.02 CM
TAPSE: 1.8 CM
TR MAX PG: 19 MMHG
TRICUSPID VALVE PEAK REGURGITATION VELOCITY: 2.19 M/S
Z-SCORE OF LEFT VENTRICULAR DIMENSION IN END DIASTOLE: -1.07
Z-SCORE OF LEFT VENTRICULAR DIMENSION IN END SYSTOLE: 0.6
Z-SCORE OF LEFT VENTRICULAR POSTERIOR WALL IN END DIASTOLE: 1.14

## 2022-03-15 NOTE — TELEPHONE ENCOUNTER
I spoke with the patient on the phone and discussed his ECHO results, letting them know they coincide with an ECHO he had done a few years ago. He requested I send him a paper copy of both reports in the mail.

## 2022-03-15 NOTE — TELEPHONE ENCOUNTER
Pt calling in regards to echo and a question he has. Pt sent a message on his portal     Pt can be reach at [phone]

## 2022-05-10 NOTE — PROGRESS NOTES
[facility]     CHIEF COMPLAINT   Dandy Lomax is a 66 y.o. male w/ PMHx of CAD, MERY, DDD who presents to the office for a 3mo f/u.     HISTORY OF PRESENT ILLNESS      The patient states that he has trouble going back to sleep once he wakes up during the night occasionally.  He stopped taking Lexapro 1 month after it was prescribed, and even during that period, he was only taking half a tablet. He no longer feels the need to take Lexapro because he is not anxious. He states that he has been taking Ambien as needed for trouble sleeping, but he takes it when he only has 2 to 3 hours of sleep left to go.     The patient states that he uses his CPAP nightly for his MERY.    The patient saw cardiology on March 7 and his coronary artery disease was noted to be stable.    The patient has a history of prothrombin gene mutation but has no documented history of venous thromboembolism.    The patient has documented elevated homocystine levels in his blood.  Red blood cell counts and MCV are within normal limits.    The patient has right knee pain for which she is being worked up for surgery by orthopedics.  He does not take any pain medications, including over-the-counter..    PAST MEDICAL AND SURGICAL HISTORY      PMHx:  Patient Active Problem List    Diagnosis Date Noted   • Osteoporosis 02/10/2022   • Hernia, hiatal 07/27/2021   • Chronic pain of right knee 04/22/2021   • Hemorrhoids 04/22/2021   • Benign prostatic hyperplasia 01/28/2021   • Hx of nonmelanoma skin cancer 01/28/2021   • Advance directive on file 01/28/2021   • Weakness 01/12/2021   • Salivary stones 10/26/2020   • Idiopathic peripheral neuropathy 10/26/2020   • Vitamin D deficiency 01/10/2020   • Dermatitis 01/10/2020   • Lumbar stenosis without neurogenic claudication 10/22/2019   • Lumbar radiculopathy 10/22/2019   • Lumbar facet arthropathy 10/22/2019   • Allergic rhinitis 10/01/2019   • DDD (degenerative disc disease), lumbar  10/01/2019   • Low testosterone 10/01/2019   • Trigger ring finger of right hand 06/13/2018   • Coronary artery disease 04/20/2018   • Dyslipidemia 04/20/2018   • Prothrombin mutation (CMS/HCC) 04/20/2018   • Hyperhomocystinemia (CMS/HCC) 04/20/2018   • MERY (obstructive sleep apnea) 04/20/2018   • Essential hypertension 04/20/2018   • Gastroesophageal reflux disease 04/20/2018   • Varicose veins of both lower extremities 06/08/2017   • Insomnia 06/08/2017     PSHx:  Past Surgical History:   Procedure Laterality Date   • CATARACT EXTRACTION     • COLONOSCOPY     • ESOPHAGOGASTRODUODENOSCOPY     • HAND TENDON SURGERY Right 06/2021    thumb   • LUMBAR LAMINECTOMY       Reviewed at today's visit with patient.  MEDICATIONS        Current Outpatient Medications:   •  alpha lipoic acid 200 mg capsule, Take 200 mg by mouth once daily., Disp: , Rfl:   •  aspirin (ASPIR-81) 81 mg enteric coated tablet, Take 81 mg by mouth once daily., Disp: , Rfl:   •  atorvastatin (LIPITOR) 10 mg tablet, Take 1 tablet (10 mg total) by mouth daily., Disp: 90 tablet, Rfl: 3  •  cholecalciferol, vitamin D3, (VITAMIN D3) 2,000 unit tablet, Take by mouth daily., Disp: , Rfl:   •  coenzyme Q10 (CO Q-10) 200 mg capsule, Take 200 mg by mouth once daily., Disp: , Rfl:   •  escitalopram (LEXAPRO) 10 mg tablet, Take 1 tablet (10 mg total) by mouth daily., Disp: 90 tablet, Rfl: 0  •  Lactobac no.41-Bifidobact no.7 (PROBIOTIC-10) 70 mg (3 billion cell) capsule, Take by mouth daily., Disp: , Rfl:   •  losartan (COZAAR) 50 mg tablet, Take 1 tablet (50 mg total) by mouth daily., Disp: 90 tablet, Rfl: 3  •  magnesium 250 mg tablet, Take 250 mg by mouth 2 (two) times a day.  , Disp: , Rfl:   •  MULTIVIT WITH MINERALS/LUTEIN (MULTIVITAMIN 50 PLUS ORAL), Take by mouth once daily., Disp: , Rfl:   •  omega-3 fatty acids 1,000 mg capsule, Take by mouth once daily. Two capsules daily, Disp: , Rfl:   •  pantoprazole (PROTONIX) 40 mg EC tablet, Take 40 mg by mouth  "daily.  , Disp: , Rfl:   •  resveratrol 250 mg capsule, Take 250 mg by mouth daily.  , Disp: , Rfl:   •  zolpidem (AMBIEN) 5 mg tablet, Take 1 tablet (5 mg total) by mouth nightly as needed for sleep., Disp: 30 tablet, Rfl: 0  Reviewed at today's visit with patient.  ALLERGIES      Patient has no known allergies.  Reviewed at today's visit with patient.  FAMILY HISTORY      Family History   Problem Relation Age of Onset   • Hypertension Biological Mother    • Heart failure Biological Mother    • Uterine cancer Biological Mother    • Heart attack Biological Father    • Hypertension Biological Father    • COPD Biological Father    • No Known Problems Biological Sister      Reviewed at today's visit with patient.  SOCIAL HISTORY      Social History     Socioeconomic History   • Marital status:      Spouse name: None   • Number of children: 1   • Years of education: None   • Highest education level: None   Occupational History   • Occupation:    Tobacco Use   • Smoking status: Never Smoker   • Smokeless tobacco: Never Used   Vaping Use   • Vaping Use: Never used   Substance and Sexual Activity   • Alcohol use: Yes     Comment: Rare alcohol   • Drug use: No   • Sexual activity: Defer   Social History Narrative    One son, 10 y/o.      Reviewed at today's visit with patient.  REVIEW OF SYSTEMS      .Review of Systems   All other systems reviewed and are negative.    PHYSICAL EXAMINATION      Visit Vitals  /80   Pulse 74   Temp 36.6 °C (97.8 °F)   Ht 1.803 m (5' 11\")   Wt 72.6 kg (160 lb)   SpO2 98%   BMI 22.32 kg/m²     Body mass index is 22.32 kg/m².    Physical Exam  Constitutional:       Appearance: Normal appearance. He is normal weight.   HENT:      Head: Normocephalic and atraumatic.   Cardiovascular:      Rate and Rhythm: Normal rate and regular rhythm.      Heart sounds: Normal heart sounds.   Pulmonary:      Effort: Pulmonary effort is normal.      Breath sounds: Normal breath " sounds.   Abdominal:      General: Abdomen is flat. Bowel sounds are normal.      Palpations: Abdomen is soft.      Tenderness: There is no abdominal tenderness.   Musculoskeletal:      Right lower leg: No edema.      Left lower leg: No edema.   Skin:     General: Skin is warm and dry.   Neurological:      Mental Status: He is alert and oriented to person, place, and time.   Psychiatric:         Mood and Affect: Mood normal.         Behavior: Behavior normal.       LABS / IMAGING / STUDIES      Lab Results   Component Value Date    CREATININE 1.01 02/16/2022     Lab Results   Component Value Date    WBC 5.4 02/16/2022    HGB 14.1 02/16/2022    HCT 41.8 02/16/2022    MCV 87 02/16/2022     02/16/2022     Lab Results   Component Value Date    CHOL 167 07/29/2021    CHOL 159 01/27/2020    CHOL 162 10/31/2019    TRIG 33 07/29/2021    TRIG 30 01/27/2020    TRIG 37 10/31/2019    HDL 78 07/29/2021    HDL 82 01/27/2020    HDL 75 10/31/2019     Lab Results   Component Value Date    HGBA1C 5.4 12/12/2016     No results found for: INA DELEON4HUR     Reviewed at today's visit with patient.     ASSESSMENT AND PLAN   Problem List Items Addressed This Visit        Nervous    Chronic pain of right knee     MRI 4/2022 R Knee:  Complex tear of the medial meniscus with displaced flap fragment and moderate extrusion, unchanged from 2/13/2020. New unstable cartilage flap along the posterior articular surface of the medial femoral condyle. Mild quadriceps and patellar tendinosis, similar in extent compared to prior exam.  Pt follows w/ Dr Vic Berman at the [facility].  The patient states that he is currently being worked up for knee surgery.  The pain is manageable without any pain medications, including over-the-counter.    - Plan as per orthopedics                  Respiratory    MERY (obstructive sleep apnea)     Patient follows with sleep medicine for his obstructive sleep apnea.  He states that he wears his  CPAP machine nightly.    - Patient encouraged to continue nightly CPAP              Circulatory    Coronary artery disease     Patient follows with Dr. Gustafson  He last saw him on March 7. The patient has had prior cardiac catheterization which demonstrated nonobstructive LAD coronary artery disease in 2016.  His recent stress echo November 2019 demonstrated normal wall motion.  His cardiologist states that his CAD is stable at this time.    - Plan as per cardiology              Endocrine/Metabolic    Hyperhomocystinemia (CMS/[facility])     Notes by his cardiologist notes that he has had an elevated homocysteine level in his blood of 11, but this information is found nowhere on epic.  There is no documentation elsewhere on epic.  Blood work in February showed normal red blood cell counts and MCV.  Patient does not endorse any symptoms of neuropathy.    - Continue monitoring for symptoms and macrocytic anemia on CBC.              Other    Prothrombin mutation (CMS/[facility]) - Primary     The patient's cardiologist has documented that he has a prothrombin gene mutation.  The patient has no documented history of VTE.    - Continue to monitor for signs and symptoms of VTE           Insomnia     Patient does not have trouble falling asleep initially, but once he wakes up in the middle of the night, he has trouble falling back asleep.  He has been taking Ambien as needed when he wakes up in the middle of the night and has trouble falling asleep.  He states this is usually 2 to 3 hours before its time to wake up.  She denies any symptoms of anxiety or depression.  He states that he wears his CPAP nightly.    - Patient given a 1 month supply of Ambien  - Patient strongly encouraged to follow-up with his sleep medicine doctor for his insomnia                  Sofie Bledsoe MD  05/12/22  4:34 PM

## 2022-05-12 ENCOUNTER — OFFICE VISIT (OUTPATIENT)
Dept: FAMILY MEDICINE | Facility: CLINIC | Age: 67
End: 2022-05-12
Payer: MEDICARE

## 2022-05-12 VITALS
WEIGHT: 160 LBS | TEMPERATURE: 97.8 F | HEIGHT: 71 IN | HEART RATE: 74 BPM | SYSTOLIC BLOOD PRESSURE: 132 MMHG | DIASTOLIC BLOOD PRESSURE: 80 MMHG | BODY MASS INDEX: 22.4 KG/M2 | OXYGEN SATURATION: 98 %

## 2022-05-12 DIAGNOSIS — G47.00 INSOMNIA, UNSPECIFIED TYPE: ICD-10-CM

## 2022-05-12 DIAGNOSIS — D68.52 PROTHROMBIN MUTATION (CMS/HCC): Primary | ICD-10-CM

## 2022-05-12 DIAGNOSIS — M25.561 CHRONIC PAIN OF RIGHT KNEE: ICD-10-CM

## 2022-05-12 DIAGNOSIS — G89.29 CHRONIC PAIN OF RIGHT KNEE: ICD-10-CM

## 2022-05-12 DIAGNOSIS — G47.33 OSA (OBSTRUCTIVE SLEEP APNEA): ICD-10-CM

## 2022-05-12 DIAGNOSIS — R79.83 HYPERHOMOCYSTINEMIA: ICD-10-CM

## 2022-05-12 DIAGNOSIS — I25.10 CORONARY ARTERY DISEASE INVOLVING NATIVE CORONARY ARTERY OF NATIVE HEART WITHOUT ANGINA PECTORIS: ICD-10-CM

## 2022-05-12 PROBLEM — F41.9 ANXIETY DISORDER: Status: RESOLVED | Noted: 2018-04-20 | Resolved: 2022-05-12

## 2022-05-12 PROCEDURE — 99214 OFFICE O/P EST MOD 30 MIN: CPT | Mod: GC

## 2022-05-12 PROCEDURE — G8754 DIAS BP LESS 90: HCPCS

## 2022-05-12 PROCEDURE — G8752 SYS BP LESS 140: HCPCS

## 2022-05-12 RX ORDER — ZOLPIDEM TARTRATE 5 MG/1
5 TABLET ORAL NIGHTLY PRN
Qty: 30 TABLET | Refills: 0 | Status: SHIPPED | OUTPATIENT
Start: 2022-05-12

## 2022-05-12 NOTE — ASSESSMENT & PLAN NOTE
The patient's cardiologist has documented that he has a prothrombin gene mutation.  The patient has no documented history of VTE.    - Continue to monitor for signs and symptoms of VTE

## 2022-05-12 NOTE — ASSESSMENT & PLAN NOTE
Patient follows with Dr. Gustafson  He last saw him on March 7. The patient has had prior cardiac catheterization which demonstrated nonobstructive LAD coronary artery disease in 2016.  His recent stress echo November 2019 demonstrated normal wall motion.  His cardiologist states that his CAD is stable at this time.    - Plan as per cardiology

## 2022-05-12 NOTE — ASSESSMENT & PLAN NOTE
MRI 4/2022 R Knee:  Complex tear of the medial meniscus with displaced flap fragment and moderate extrusion, unchanged from 2/13/2020. New unstable cartilage flap along the posterior articular surface of the medial femoral condyle. Mild quadriceps and patellar tendinosis, similar in extent compared to prior exam.  Pt follows w/ Dr Vic Berman at the [facility].  The patient states that he is currently being worked up for knee surgery.  The pain is manageable without any pain medications, including over-the-counter.    - Plan as per orthopedics

## 2022-05-12 NOTE — ASSESSMENT & PLAN NOTE
Patient follows with sleep medicine for his obstructive sleep apnea.  He states that he wears his CPAP machine nightly.    - Patient encouraged to continue nightly CPAP

## 2022-05-12 NOTE — ASSESSMENT & PLAN NOTE
Patient does not have trouble falling asleep initially, but once he wakes up in the middle of the night, he has trouble falling back asleep.  He has been taking Ambien as needed when he wakes up in the middle of the night and has trouble falling asleep.  He states this is usually 2 to 3 hours before its time to wake up.  She denies any symptoms of anxiety or depression.  He states that he wears his CPAP nightly.    - Patient given a 1 month supply of Ambien  - Patient strongly encouraged to follow-up with his sleep medicine doctor for his insomnia

## 2022-05-12 NOTE — ASSESSMENT & PLAN NOTE
Notes by his cardiologist notes that he has had an elevated homocysteine level in his blood of 11, but this information is found nowhere on epic.  There is no documentation elsewhere on epic.  Blood work in February showed normal red blood cell counts and MCV.  Patient does not endorse any symptoms of neuropathy.    - Continue monitoring for symptoms and macrocytic anemia on CBC.

## 2022-05-24 ENCOUNTER — TELEPHONE (OUTPATIENT)
Dept: SCHEDULING | Facility: CLINIC | Age: 67
End: 2022-05-24

## 2022-05-24 NOTE — TELEPHONE ENCOUNTER
Pt would like to r/s TRIPP andrade/ Maribel on 6/13 at [facility] due to scheduling conflict.    Pt ok to schedule at [facility] or [facility].     Pt can be reached at #[phone]    Ty.

## 2022-06-01 ENCOUNTER — TELEPHONE (OUTPATIENT)
Dept: CARDIOLOGY | Facility: CLINIC | Age: 67
End: 2022-06-01
Payer: MEDICARE

## 2022-06-01 NOTE — TELEPHONE ENCOUNTER
I called pt to remind him regarding lab test prior to his 06/03/22 OV. But he stated that he is waiting the  to call him back to reschedule him with Maribel while Dr. Gustafson is here. Did any one knows about this pt?     Kalpesh PARKER

## 2022-06-23 ENCOUNTER — TELEPHONE (OUTPATIENT)
Dept: FAMILY MEDICINE | Facility: CLINIC | Age: 67
End: 2022-06-23

## 2022-06-23 ENCOUNTER — TELEMEDICINE (OUTPATIENT)
Dept: FAMILY MEDICINE | Facility: CLINIC | Age: 67
End: 2022-06-23
Payer: MEDICARE

## 2022-06-23 DIAGNOSIS — H69.92 DYSFUNCTION OF LEFT EUSTACHIAN TUBE: ICD-10-CM

## 2022-06-23 DIAGNOSIS — J06.9 ACUTE URI: Primary | ICD-10-CM

## 2022-06-23 PROCEDURE — 99213 OFFICE O/P EST LOW 20 MIN: CPT | Mod: 95 | Performed by: FAMILY MEDICINE

## 2022-06-23 RX ORDER — FLUTICASONE PROPIONATE 50 MCG
1 SPRAY, SUSPENSION (ML) NASAL DAILY
Qty: 1 G | Refills: 3 | Status: SHIPPED | OUTPATIENT
Start: 2022-06-23 | End: 2022-12-05

## 2022-06-23 ASSESSMENT — ENCOUNTER SYMPTOMS
APPETITE CHANGE: 0
CHILLS: 0
CHEST TIGHTNESS: 0
FEVER: 0
DYSPHORIC MOOD: 0
UNEXPECTED WEIGHT CHANGE: 0
MYALGIAS: 0
LIGHT-HEADEDNESS: 0
NERVOUS/ANXIOUS: 0
EYE PAIN: 0
DIZZINESS: 0
TROUBLE SWALLOWING: 0
WOUND: 0
ABDOMINAL PAIN: 0
EYE DISCHARGE: 0
SHORTNESS OF BREATH: 0
ARTHRALGIAS: 0
DIFFICULTY URINATING: 0

## 2022-06-23 NOTE — PROGRESS NOTES
Verification of Patient Location:  The patient affirms they are currently located in the following state: [place]    Request for Consent:    Audio and Video Encounter   David, my name is Festus Rockwell DO.  Before we proceed, can you please verify your identification by telling me your full name and date of birth?  Can you tell me who is in the room with you?    You and I are about to have a telemedicine check-in or visit because you have requested it.  This is a live video-conference.  I am a real person, speaking to you in real time.  There is no one else with me on the video-conference.  However, when we use ([employer], [employer], etc) it is important for you to know that the video-conference may not be secure or private.  I am not recording this conversation and I am asking you not to record it.  This telemedicine visit will be billed to your health insurance or you, if you are self-insured.  You understand you will be responsible for any copayments or coinsurances that apply to your telemedicine visit.  Communication platform used for this encounter:  [employer] Video Visit (with Zoom integration)     Before starting our telemedicine visit, I am required to get your consent for this virtual check-in or visit by telemedicine. Do you consent?      Patient Response to Request for Consent:  Yes      Visit Documentation:  Subjective     Patient ID: Last Lomax is a 66 y.o. male.  1955      HPI    Left sided ear fullness for the last week after being exposed to his son who is covid positive     Patient has tested multiple times this week and they have been negative     He does have post nasal drip , eustachian tube fullness , and periodic fatigue           Review of Systems   Constitutional: Negative for appetite change, chills, fever and unexpected weight change.   HENT: Negative for congestion, postnasal drip and trouble swallowing.    Eyes: Negative for pain, discharge and visual disturbance.   Respiratory:  Negative for chest tightness and shortness of breath.    Cardiovascular: Negative for chest pain and leg swelling.   Gastrointestinal: Negative for abdominal pain.   Genitourinary: Negative for difficulty urinating and urgency.   Musculoskeletal: Negative for arthralgias and myalgias.   Skin: Negative for rash and wound.   Neurological: Negative for dizziness and light-headedness.   Psychiatric/Behavioral: Negative for dysphoric mood. The patient is not nervous/anxious.          Assessment/Plan   Diagnoses and all orders for this visit:    Acute URI (Primary)    Dysfunction of left eustachian tube    Other orders  -     fluticasone propionate (FLONASE) 50 mcg/actuation nasal spray; Administer 1 spray into each nostril daily.    supportive care for now   flonase daily   If no improvement to let me know       Time Spent:  I spent 16 minutes on this date of service performing the following activities: obtaining history, performing examination, entering orders and documenting.

## 2022-06-23 NOTE — TELEPHONE ENCOUNTER
Patient called said he was exposed to someone with covid-19 and that he now has runny nose, fatigue and ear infection and would like face to face appointment.      Patient said his symptoms started 6/17/2022  and he took a covid-19 test on 6/19/2022 ,   6/20/2022 , 6/21/2022.and 6/22/2022.      Please advise.

## 2022-06-23 NOTE — PROGRESS NOTES
[facility]    2022      Re:  DANDY LOMAX  :  1955    Dear Sami:    It was my pleasure to see your patient, Dandy Lomax, in the [facility] today.      As you know, we follow him for subclinical coronary artery disease and dyslipidemia.     The patient has been through a previous cardiac workup by Dr. Santo Tucker at [place].  A coronary calcium score was obtained in  which revealed Agatston score 174 with significant plaquing of his LAD.  This prompted a stress study which was equivocal for ischemia.  This was followed by a diagnostic left heart catheterization in 2016 which demonstrated moderate nonobstructive Mid LAD disease with negative FFR.  Medical management was indicated.    He did not have updated labs for this appointment.    The patient has been on a prevention program over the past several years.  He is on Lipitor 10 mg daily.  His last advanced lipid panel dated 2021 revealed a total cholesterol of 162, triglycerides 31, HDL 76, LDL 69.  This is an excellent response to Lipitor 10 mg daily.  We reviewed his previous [facility] advanced lipid panel as well as his medications and supplements in detail and are making no substantial changes today.     His EKG today demonstrates an ECG finding of right BBB.  In reviewing the records he had an incomplete right bundle branch block in 2018 and then a complete right bundle branch block in 2019.  He underwent a stress echo which was negative for microischemia or scar.  In  his EKG reverted back to normal conduction pattern.  We discussed this finding in detail, it is not cause for immediate concern as he is asymptomatic.  His repeat repeat ECHO was unremarkable.      PAST MEDICAL HISTORY:    Past Medical History:   Diagnosis Date   • Abnormal ECG     RBBB   • Allergic rhinitis 10/1/2019   • Anxiety    • Coronary artery disease     Coronary artery disease, cardiac catheterization in November  2016, moderate stenosis involving the mid LAD, FFR negative   • DDD (degenerative disc disease), lumbar 10/1/2019   • Dyslipidemia 4/20/2018   • Essential hypertension 4/20/2018   • GERD (gastroesophageal reflux disease)    • Hyperhomocysteinemia (CMS/HCC)    • Hyperhomocystinemia (CMS/HCC) 4/20/2018   • Hypertension    • Idiopathic peripheral neuropathy 10/26/2020   • Lipid disorder    • Obstructive sleep apnea    • Prothrombin mutation (CMS/HCC) 4/20/2018   • Varicose veins of legs 6/8/2017    Last Assessment & Plan:  Right lower leg VV  Ref Dr Wendy Quiroz at [facility]   • Vitamin D deficiency 1/10/2020       PAST SURGICAL HISTORY:  Retinal tear, prostate biopsy, hernia repair.    CURRENT MEDICATIONS:   Current Outpatient Medications   Medication Instructions   • alpha lipoic acid 200 mg, oral, Daily   • aspirin 81 mg, oral, Daily   • atorvastatin (LIPITOR) 10 mg, oral, Daily   • cholecalciferol, vitamin D3, 50 mcg (2,000 unit) tablet oral, Daily   • coenzyme Q10 (COQ10) 200 mg, oral, Daily   • doxepin (SILENOR) 3 mg, oral, Nightly PRN   • fluticasone propionate (FLONASE) 50 mcg/actuation nasal spray 1 spray, Each Nostril, Daily   • Lactobac no.41-Bifidobact no.7 70 mg (3 billion cell) capsule oral, Daily   • losartan (COZAAR) 50 mg, oral, Daily   • magnesium 250 mg, oral, 2 times daily   • MULTIVIT WITH MINERALS/LUTEIN (MULTIVITAMIN 50 PLUS ORAL) oral, Daily   • omega-3 fatty acids 1,000 mg capsule oral, Daily, Two capsules daily    • pantoprazole (PROTONIX) 40 mg, oral, Daily   • resveratroL 250 mg, oral, Daily   • zolpidem (AMBIEN) 5 mg, oral, Nightly PRN       SUPPLEMENTS:  Multivitamin, omega-3 fish oil, vitamin-D, Reservitol, methyl-B essentials, probiotic, coenzyme-Q10, lipoic acid, calcium.  We reviewed his supplements in detail today.    ALLERGIES:  No known drug allergies.    FAMILY HISTORY:  Mother with hypertension, congestive heart failure.  Father with myocardial infarction,  hypertension.    SOCIAL HISTORY:  He is .  His wife's name is Lakeshia Presley.  He has one child.  He is an , self-employed.  He does not smoke.  Rare alcohol.  Regular exercise.    REVIEW OF SYSTEMS:  The patient has snoring, uses nasal CPAP, so he does have sleep apnea, GERD, reflux.  He has been on proton pump inhibitors for 15 years.  The remainder of his 12 point review of systems is negative.    PHYSICAL EXAMINATION:   Last is a 66-year-old male no acute distress    Vitals:    06/24/22 1107   BP: 120/70   Pulse: 71   Resp: 16   SpO2: 98%        Wt Readings from Last 3 Encounters:   06/24/22 71.2 kg (157 lb)   05/12/22 72.6 kg (160 lb)   03/14/22 72.6 kg (160 lb)       HEENT:  Unremarkable.  Neck:  Supple, no JVD.  Lungs:  Clear to auscultation and percussion.  Cardiac:  Regular rate and rhythm.  Abdomen:  Soft, bowel sounds present, no organomegaly.  Extremities:  No edema, pulses intact.  Skin:  Warm and dry.  Neuro:  Alert and oriented X 3.    LABORATORY DATA:      EKG: Normal sinus rhythm with a right bundle branch block.  The patient has had intermittent right bundle branch block in the past.    Coronary Calcium score 2016 at [place]: 174 predominantly involving the mid LAD.       Cardiac catheterization 2016:  Mild to moderate non-obstructive LAD disease. FFR LAD negative.  Minor luminal irregularities LCX and RCA.  Normal LV function EF 60%.    Echocardiogram 3/14/2022:  · Left Ventricle: Normal ventricle size. Mild concentric left ventricular hypertrophy. Preserved systolic function. Estimated EF 55%. Normal septal motion. No regional wall motion abnormalities.  · Aortic Valve: Tricuspid valve. Sclerotic leaflets. No regurgitation. No stenosis. Mean gradient = 4.00 mmHg. Peak velocity = 1.49 m/s. Calculated area = 2.79 cm2.  · Mitral Valve: Normal leaflet structure and normal leaflet motion. Mild to moderate regurgitation.  · Left Atrium: Moderately dilated  "atrium.  · Tricuspid Valve: Normal structure. Trace regurgitation.  · Right Ventricle: Normal ventricle size. Normal systolic function.      Stress echo November 2019:  · Stress echocardiogram reveals discordant results with borderline abnormal ecg but normal wall motion.  · Response to Stress: A horizontal ST segment depression of 1 mm was noted during stress in the V5 and V6 leads, beginning at 11 minutes 50 seconds of stress, and returning to baseline by 1 min of recovery.  · Post-stress echocardiogram does not meet criteria for ischemia. All ventricular walls become hyperdynamic and the ejection fraction improves.    [facility] Advanced lipid panel April 2019 in \"media\":  Total cholesterol 153 LDL 72 HDL 77 trig, 30 APO B 72 LP(a) 64  Inflammation panel: Normal  Endothelial function panel: Normal  Diabetes panel: Normal  Metabolic panel: Normal.  Vitamin D 66 homocystine 11  Omega-3 index 10.6  Sterol panel: Normal  Renal function panel: GFR 74    Basic Lipid Panels:  Component      Latest Ref Rng & Units 10/31/2019 1/27/2020 7/29/2021   Triglycerides      0 - 149 mg/dL 37 30 33   LDL Calculated      0 - 99 mg/dL 76 71 81   Cholesterol      100 - 199 mg/dL 162 159 167   VLDL Cholesterol Timmy      5 - 40 mg/dL  6 8   HDL      >39 mg/dL 75 82 78     [facility]  12/21/21:  Lipid panel: Total cholesterol 162, LDL 69, HDL 76, triglycerides 31. ApoB 60, Lp(a) 25.  Inflammation panel: HS CRP 0.4 LP MIGUEL ÁNGEL 2 129  Sterol balance panel: Hyper absorber of cholesterol  Diabetes panel: Normal hemoglobin A1c 5.5 insulin 6 Kacy IR 1.6 LP IR 13  Fatty acid panel: EPA 68 DHA 80 mcg/mL  Metabolic panel: Uric acid 5.2 homocysteine 10.4  Hormone panel: TSH 3.6 vitamin D 63    IMPRESSIONS/RECOMMENDATIONS:  1. Coronary artery disease.  The patient has had prior cardiac catheterization which demonstrated nonobstructive LAD coronary artery disease in 2016.  His recent stress echo November 2019 demonstrated normal wall motion.  He did " have borderline EKG changes at peak exercise. The patient needs an aggressive risk factor modification program.  He will continue baby aspirin.   2. Dyslipidemia.  Continue Lipitor.  He has reached goal.  3. 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  The patient is at increased risk for progressive atherosclerosis.  4. Prothrombin mutation.  The patient is at increased risk of DVT.  5. Hyperhomocystinemia.  The patient should continue with B-complex and L5 methyl folate.  6. Obstructive sleep apnea.  The patient will continue nasal CPAP.  7. Borderline hypertension.  Patient will monitor his blood pressures at home.  Our goal is a blood pressure less than 130/80.  His bp today is He continues on losartan 50 mg daily.  8. Anxiety disorder.  9. GERD and irritable bowel syndrome.  Stable  10.        New right bundle branch block.  The patient has demonstrated an incomplete right bundle branch block since 2018.  He had a complete right bundle branch block in 2019.  This prompted a stress echo which was negative for ischemia in 2019.  His EKG in 2020 reverted to normal sinus rhythm without bundle branch block.  Repeat echo above    Summary: Dandy is demonstrating cardiovascular stability. .  We reviewed his prevention program in detail.  We are making no substantial changes today.    We reviewed his EKGs dating back to 2016.  He had incomplete right bundle branch block in 2018.  He subsequently developed complete bundle right bundle branch block in 2019.  A stress echo in 2019 was negative for ischemia.  In 2020 he reverted back to normal conduction.  Today his EKG demonstrates right bundle branch block.  I suspect that he is having intermittent right bundle branch block.  We will obtain an echocardiogram.    This was a 30-minute patient encounter with greater than 50% time spent in care coordination and counseling.      Sincerely,    DEV Mendoza    6/24/2022

## 2022-06-24 ENCOUNTER — OFFICE VISIT (OUTPATIENT)
Dept: CARDIOLOGY | Facility: CLINIC | Age: 67
End: 2022-06-24
Payer: MEDICARE

## 2022-06-24 VITALS
WEIGHT: 157 LBS | BODY MASS INDEX: 21.98 KG/M2 | DIASTOLIC BLOOD PRESSURE: 70 MMHG | HEIGHT: 71 IN | SYSTOLIC BLOOD PRESSURE: 120 MMHG | HEART RATE: 71 BPM | RESPIRATION RATE: 16 BRPM | OXYGEN SATURATION: 98 %

## 2022-06-24 DIAGNOSIS — I25.10 CAD IN NATIVE ARTERY: Primary | ICD-10-CM

## 2022-06-24 PROCEDURE — G8752 SYS BP LESS 140: HCPCS | Performed by: NURSE PRACTITIONER

## 2022-06-24 PROCEDURE — 93000 ELECTROCARDIOGRAM COMPLETE: CPT | Performed by: NURSE PRACTITIONER

## 2022-06-24 PROCEDURE — 99214 OFFICE O/P EST MOD 30 MIN: CPT | Performed by: NURSE PRACTITIONER

## 2022-06-24 PROCEDURE — G8754 DIAS BP LESS 90: HCPCS | Performed by: NURSE PRACTITIONER

## 2022-06-24 RX ORDER — DOXEPIN 3 MG/1
3 TABLET, FILM COATED ORAL NIGHTLY PRN
COMMUNITY
Start: 2022-05-27

## 2022-06-25 LAB — SPECIMEN STATUS: NORMAL

## 2022-06-26 LAB
ALBUMIN SERPL-MCNC: 4.4 G/DL (ref 3.8–4.8)
ALBUMIN/GLOB SERPL: 2.6 {RATIO} (ref 1.2–2.2)
ALP SERPL-CCNC: 62 IU/L (ref 44–121)
ALT SERPL-CCNC: 14 IU/L (ref 0–44)
AST SERPL-CCNC: 21 IU/L (ref 0–40)
BILIRUB SERPL-MCNC: 0.7 MG/DL (ref 0–1.2)
BUN SERPL-MCNC: 17 MG/DL (ref 8–27)
BUN/CREAT SERPL: 16 (ref 10–24)
CALCIUM SERPL-MCNC: 9.4 MG/DL (ref 8.6–10.2)
CHLORIDE SERPL-SCNC: 104 MMOL/L (ref 96–106)
CHOLEST SERPL-MCNC: 154 MG/DL (ref 100–199)
CO2 SERPL-SCNC: 21 MMOL/L (ref 20–29)
CREAT SERPL-MCNC: 1.04 MG/DL (ref 0.76–1.27)
EGFRCR SERPLBLD CKD-EPI 2021: 79 ML/MIN/1.73
GLOBULIN SER CALC-MCNC: 1.7 G/DL (ref 1.5–4.5)
GLUCOSE SERPL-MCNC: 97 MG/DL (ref 65–99)
HDLC SERPL-MCNC: 73 MG/DL
LDLC SERPL CALC-MCNC: 71 MG/DL (ref 0–99)
POTASSIUM SERPL-SCNC: 4.2 MMOL/L (ref 3.5–5.2)
PROT SERPL-MCNC: 6.1 G/DL (ref 6–8.5)
SODIUM SERPL-SCNC: 143 MMOL/L (ref 134–144)
TRIGL SERPL-MCNC: 42 MG/DL (ref 0–149)
VLDLC SERPL CALC-MCNC: 10 MG/DL (ref 5–40)

## 2022-06-28 ENCOUNTER — TELEPHONE (OUTPATIENT)
Dept: FAMILY MEDICINE | Facility: CLINIC | Age: 67
End: 2022-06-28
Payer: MEDICARE

## 2022-06-28 NOTE — TELEPHONE ENCOUNTER
Patient called would like appointment said he has had pressure in his ears and runny nose   For (11) days         Patient said he did call for an appointment with an ENT but they didn't have any appointment until 7/12/2022.      Patient said he got a PCR test on 6/26/2022   and it was neg for covid-19.

## 2022-08-18 ENCOUNTER — TRANSCRIBE ORDERS (OUTPATIENT)
Dept: LAB | Facility: HOSPITAL | Age: 67
End: 2022-08-18

## 2022-08-18 ENCOUNTER — APPOINTMENT (OUTPATIENT)
Dept: LAB | Facility: HOSPITAL | Age: 67
End: 2022-08-18
Attending: INTERNAL MEDICINE
Payer: MEDICARE

## 2022-08-18 DIAGNOSIS — R53.83 OTHER FATIGUE: ICD-10-CM

## 2022-08-18 DIAGNOSIS — R50.9 FEVER, UNSPECIFIED: ICD-10-CM

## 2022-08-18 DIAGNOSIS — I10 ESSENTIAL (PRIMARY) HYPERTENSION: Primary | ICD-10-CM

## 2022-08-18 DIAGNOSIS — I10 ESSENTIAL (PRIMARY) HYPERTENSION: ICD-10-CM

## 2022-08-18 DIAGNOSIS — E78.5 HYPERLIPIDEMIA, UNSPECIFIED: ICD-10-CM

## 2022-08-18 LAB
ALBUMIN SERPL-MCNC: 3.6 G/DL (ref 3.4–5)
ALP SERPL-CCNC: 47 IU/L (ref 35–126)
ALT SERPL-CCNC: 18 IU/L (ref 16–63)
ANION GAP SERPL CALC-SCNC: 7 MEQ/L (ref 3–15)
AST SERPL-CCNC: 21 IU/L (ref 15–41)
BACTERIA URNS QL MICRO: ABNORMAL /HPF
BASOPHILS # BLD: 0.05 K/UL (ref 0.01–0.1)
BASOPHILS NFR BLD: 1.1 %
BILIRUB SERPL-MCNC: 1.1 MG/DL (ref 0.3–1.2)
BILIRUB UR QL STRIP.AUTO: NEGATIVE MG/DL
BUN SERPL-MCNC: 11 MG/DL (ref 8–20)
CALCIUM SERPL-MCNC: 9.2 MG/DL (ref 8.9–10.3)
CHLORIDE SERPL-SCNC: 103 MEQ/L (ref 98–109)
CLARITY UR REFRACT.AUTO: ABNORMAL
CO2 SERPL-SCNC: 28 MEQ/L (ref 22–32)
COLOR UR AUTO: YELLOW
CREAT SERPL-MCNC: 1 MG/DL (ref 0.8–1.3)
DIFFERENTIAL METHOD BLD: ABNORMAL
EOSINOPHIL # BLD: 0.04 K/UL (ref 0.04–0.54)
EOSINOPHIL NFR BLD: 0.8 %
ERYTHROCYTE [DISTWIDTH] IN BLOOD BY AUTOMATED COUNT: 12.9 % (ref 11.6–14.4)
ERYTHROCYTE [SEDIMENTATION RATE] IN BLOOD BY WESTERGREN METHOD: <3 MM/HR
GFR SERPL CREATININE-BSD FRML MDRD: >60 ML/MIN/1.73M*2
GLUCOSE SERPL-MCNC: 132 MG/DL (ref 70–99)
GLUCOSE UR STRIP.AUTO-MCNC: NEGATIVE MG/DL
HCT VFR BLDCO AUTO: 41.5 % (ref 40.1–51)
HGB BLD-MCNC: 13.7 G/DL (ref 13.7–17.5)
HGB UR QL STRIP.AUTO: NEGATIVE
HYALINE CASTS #/AREA URNS LPF: ABNORMAL /LPF
IMM GRANULOCYTES # BLD AUTO: 0.02 K/UL (ref 0–0.08)
IMM GRANULOCYTES NFR BLD AUTO: 0.4 %
KETONES UR STRIP.AUTO-MCNC: NEGATIVE MG/DL
LEUKOCYTE ESTERASE UR QL STRIP.AUTO: NEGATIVE
LYMPHOCYTES # BLD: 0.78 K/UL (ref 1.2–3.5)
LYMPHOCYTES NFR BLD: 16.6 %
MCH RBC QN AUTO: 29.9 PG (ref 28–33.2)
MCHC RBC AUTO-ENTMCNC: 33 G/DL (ref 32.2–36.5)
MCV RBC AUTO: 90.6 FL (ref 83–98)
MONOCYTES # BLD: 0.35 K/UL (ref 0.3–1)
MONOCYTES NFR BLD: 7.4 %
MUCOUS THREADS URNS QL MICRO: ABNORMAL /LPF
NEUTROPHILS # BLD: 3.47 K/UL (ref 1.7–7)
NEUTS SEG NFR BLD: 73.7 %
NITRITE UR QL STRIP.AUTO: NEGATIVE
NRBC BLD-RTO: 0 %
PDW BLD AUTO: 10.4 FL (ref 9.4–12.4)
PH UR STRIP.AUTO: 7.5 [PH]
PLATELET # BLD AUTO: 191 K/UL (ref 150–350)
POTASSIUM SERPL-SCNC: 4.1 MEQ/L (ref 3.6–5.1)
PROT SERPL-MCNC: 5.5 G/DL (ref 6–8.2)
PROT UR QL STRIP.AUTO: NEGATIVE
RBC # BLD AUTO: 4.58 M/UL (ref 4.5–5.8)
RBC #/AREA URNS HPF: ABNORMAL /HPF
SODIUM SERPL-SCNC: 138 MEQ/L (ref 136–144)
SP GR UR REFRACT.AUTO: 1.01
SQUAMOUS URNS QL MICRO: ABNORMAL /HPF
T4 FREE SERPL-MCNC: 0.9 NG/DL (ref 0.58–1.64)
TSH SERPL DL<=0.05 MIU/L-ACNC: 1.64 MIU/L (ref 0.34–5.6)
UROBILINOGEN UR STRIP-ACNC: 0.2 EU/DL
WBC # BLD AUTO: 4.71 K/UL (ref 3.8–10.5)
WBC #/AREA URNS HPF: ABNORMAL /HPF

## 2022-08-18 PROCEDURE — 84439 ASSAY OF FREE THYROXINE: CPT

## 2022-08-18 PROCEDURE — 85652 RBC SED RATE AUTOMATED: CPT

## 2022-08-18 PROCEDURE — 81001 URINALYSIS AUTO W/SCOPE: CPT

## 2022-08-18 PROCEDURE — 80053 COMPREHEN METABOLIC PANEL: CPT

## 2022-08-18 PROCEDURE — 85025 COMPLETE CBC W/AUTO DIFF WBC: CPT

## 2022-08-18 PROCEDURE — 36415 COLL VENOUS BLD VENIPUNCTURE: CPT

## 2022-08-18 PROCEDURE — 87086 URINE CULTURE/COLONY COUNT: CPT

## 2022-08-18 PROCEDURE — 84443 ASSAY THYROID STIM HORMONE: CPT

## 2022-08-19 ENCOUNTER — TELEPHONE (OUTPATIENT)
Dept: FAMILY MEDICINE | Facility: CLINIC | Age: 67
End: 2022-08-19

## 2022-08-19 LAB — BACTERIA UR CULT: NORMAL

## 2022-09-08 NOTE — TELEPHONE ENCOUNTER
Patient requesting call back regarding bill he received for labs done at  [facility] on 2/16/2022.        Patient said he faxed copy of the bill from [facility] on 8/19/2022.

## 2022-10-14 ENCOUNTER — TELEPHONE (OUTPATIENT)
Dept: SCHEDULING | Facility: CLINIC | Age: 67
End: 2022-10-14
Payer: MEDICARE

## 2022-10-14 NOTE — TELEPHONE ENCOUNTER
Cardiac Clearance     Name of caller: Last     Relationship to patient: self     Name of patient: Dandy Lomax    Name of physician: Nnamdi Gustafson MD    Date of Procedure/Surgery: 11/3    Type of Procedure/Surgery: Arthroscopy   Under anesthesia   Name of surgeon: Dr. Manuel Vega    Office contact number: Lisa Lombardi- Surgical coordinator   [phone]     Office fax number: [phone]

## 2022-10-19 NOTE — TELEPHONE ENCOUNTER
Pt called requests a call back pt needs appt within 30 days of surgery, pt requesting to see Maribel before surgery 11/3. Pt hoping to come in the next three days     Pt can be reached at [phone]

## 2022-10-20 DIAGNOSIS — I25.10 CORONARY ARTERY DISEASE INVOLVING NATIVE CORONARY ARTERY OF NATIVE HEART WITHOUT ANGINA PECTORIS: Primary | ICD-10-CM

## 2022-10-20 NOTE — TELEPHONE ENCOUNTER
Spoke to patient. He is scheduled with his primary Dr Dr Ureña.  He said that he is doing EKG there and also bringing clearance form to be filled out.

## 2022-10-20 NOTE — TELEPHONE ENCOUNTER
Spoke to Leslie the surgery coordinator. Patient needs a new cardiac clearance appt with a new EKG. They will not accept the last office note.  Please advise

## 2022-12-02 NOTE — PROGRESS NOTES
[facility]    2022    Ian Phillip MD  [address] [place], [facility] [address]  [place] [address]    Re:  HILDA LOMAX  :  1955      Dear Sami:    It was my pleasure to see your patient, Hilda Lomax, in the [facility] today.      As you know, we follow him for subclinical coronary artery disease and dyslipidemia.     The patient has been through a previous cardiac workup by Dr. Santo Tucker at [place].  A coronary calcium score was obtained in  which revealed Agatston score 174 with significant plaquing of his LAD.  This prompted a stress study which was equivocal for ischemia.  This was followed by a diagnostic left heart catheterization in 2016 which demonstrated moderate nonobstructive Mid LAD disease with negative FFR.  Medical management was indicated.    The patient has been on a prevention program over the past several years.  He is on Lipitor 10 mg daily.  His last advanced lipid panel dated 2021 revealed a total cholesterol of 162, triglycerides 31, HDL 76, LDL 69.  This is an excellent response to Lipitor 10 mg daily.  We reviewed his previous [facility] advanced lipid panel as well as his medications and supplements in detail and are making no substantial changes today.     His EKG today demonstrates an ECG finding of RBBB.  In reviewing the records he had an incomplete right bundle branch block in  and then a complete right bundle branch block in 2019.  He underwent a stress echo which was negative for microischemia or scar.  In  his EKG reverted back to normal conduction pattern.  We discussed this finding in detail, it is not cause for immediate concern as he is asymptomatic.     Today he reports he offers for preoperative clearance.  He denies any chest pain shortness breath or palpitations.  EKG demonstrates normal sinus rhythm with right bundle branch block.  Previous cardiac work-ups are noted above.  He is demonstrating  cardiovascular stability.  He is cleared for back fusion surgery.      PAST MEDICAL HISTORY:    Past Medical History:   Diagnosis Date   • Abnormal ECG     RBBB   • Allergic rhinitis 10/1/2019   • Anxiety    • Coronary artery disease     Coronary artery disease, cardiac catheterization in November 2016, moderate stenosis involving the mid LAD, FFR negative   • DDD (degenerative disc disease), lumbar 10/1/2019   • Dyslipidemia 4/20/2018   • Essential hypertension 4/20/2018   • GERD (gastroesophageal reflux disease)    • Hyperhomocysteinemia (CMS/HCC)    • Hyperhomocystinemia (CMS/HCC) 4/20/2018   • Hypertension    • Idiopathic peripheral neuropathy 10/26/2020   • Lipid disorder    • Obstructive sleep apnea    • Prothrombin mutation (CMS/HCC) 4/20/2018   • Varicose veins of legs 6/8/2017    Last Assessment & Plan:  Right lower leg VV  Ref Dr Wendy Quiroz at [facility]   • Vitamin D deficiency 1/10/2020     PAST SURGICAL HISTORY:  Retinal tear, prostate biopsy, hernia repair.    CURRENT MEDICATIONS:   Current Outpatient Medications   Medication Instructions   • alpha lipoic acid 200 mg, oral, Daily   • aspirin 81 mg, oral, Daily   • atorvastatin (LIPITOR) 10 mg, oral, Daily   • cholecalciferol, vitamin D3, 50 mcg (2,000 unit) tablet oral, Daily   • coenzyme Q10 (COQ10) 200 mg, oral, Daily   • doxepin (SILENOR) 3 mg, oral, Nightly PRN   • fluticasone propionate (FLONASE) 50 mcg/actuation nasal spray 1 spray, Each Nostril, Daily   • Lactobac no.41-Bifidobact no.7 70 mg (3 billion cell) capsule oral, Daily   • losartan (COZAAR) 50 mg, oral, Daily   • MAGNESIUM ORAL 500 mg, oral, Daily   • MULTIVIT WITH MINERALS/LUTEIN (MULTIVITAMIN 50 PLUS ORAL) oral, Daily   • omega-3 fatty acids 1,000 mg capsule oral, Daily, Two capsules daily    • pantoprazole (PROTONIX) 40 mg, oral, Daily   • resveratroL 250 mg, oral, Daily   • zolpidem (AMBIEN) 5 mg, oral, Nightly PRN       SUPPLEMENTS:  Multivitamin, omega-3 fish oil,  vitamin-D, Reservitol, methyl-B essentials, probiotic, coenzyme-Q10, lipoic acid, calcium.  We reviewed his supplements in detail today.    ALLERGIES:  No known drug allergies.    FAMILY HISTORY:  Mother with hypertension, congestive heart failure.  Father with myocardial infarction, hypertension.    SOCIAL HISTORY:  He is .  His wife's name is Lakeshia Presley.  He has one child.  He is an , self-employed.  He does not smoke.  Rare alcohol.  Regular exercise.    REVIEW OF SYSTEMS:  The patient has snoring, uses nasal CPAP, so he does have sleep apnea, GERD, reflux.  He has been on proton pump inhibitors for 15 years.  The remainder of his 12 point review of systems is negative.    PHYSICAL EXAMINATION:   Last is a 67-year-old male no acute distress    Vitals:    12/05/22 1131   BP: 126/78   Pulse: 74   SpO2: 99%      Wt Readings from Last 3 Encounters:   12/05/22 73.9 kg (163 lb)   06/24/22 71.2 kg (157 lb)   05/12/22 72.6 kg (160 lb)     HEENT:  Unremarkable.  Neck:  Supple, no JVD.  Lungs:  Clear to auscultation and percussion.  Cardiac:  Regular rate and rhythm.  Abdomen:  Soft, bowel sounds present, no organomegaly.  Extremities:  No edema, pulses intact.  Skin:  Warm and dry.  Neuro:  Alert and oriented X 3.    LABORATORY DATA:      EKG: Normal sinus rhythm with a right bundle branch block.  The patient has had intermittent right bundle branch block in the past.    Coronary Calcium score 2016 at [place]: 174 predominantly involving the mid LAD.       Cardiac catheterization 2016:  Mild to moderate non-obstructive LAD disease. FFR LAD negative.  Minor luminal irregularities LCX and RCA.  Normal LV function EF 60%.    Echocardiogram 3/14/2022:  · Left Ventricle: Normal ventricle size. Mild concentric left ventricular hypertrophy. Preserved systolic function. Estimated EF 55%. Normal septal motion. No regional wall motion abnormalities.  · Aortic Valve: Tricuspid valve. Sclerotic  "leaflets. No regurgitation. No stenosis. Mean gradient = 4.00 mmHg. Peak velocity = 1.49 m/s. Calculated area = 2.79 cm2.  · Mitral Valve: Normal leaflet structure and normal leaflet motion. Mild to moderate regurgitation.  · Left Atrium: Moderately dilated atrium.  · Tricuspid Valve: Normal structure. Trace regurgitation.  · Right Ventricle: Normal ventricle size. Normal systolic function.    Stress echo November 2019:  · Stress echocardiogram reveals discordant results with borderline abnormal ecg but normal wall motion.  · Response to Stress: A horizontal ST segment depression of 1 mm was noted during stress in the V5 and V6 leads, beginning at 11 minutes 50 seconds of stress, and returning to baseline by 1 min of recovery.  · Post-stress echocardiogram does not meet criteria for ischemia. All ventricular walls become hyperdynamic and the ejection fraction improves.    [facility] Advanced lipid panel April 2019 in \"media\":  Total cholesterol 153 LDL 72 HDL 77 trig, 30 APO B 72 LP(a) 64  Inflammation panel: Normal  Endothelial function panel: Normal  Diabetes panel: Normal  Metabolic panel: Normal.  Vitamin D 66 homocystine 11  Omega-3 index 10.6  Sterol panel: Normal  Renal function panel: GFR 74    Basic Lipid Panels:  Component      Latest Ref Rng & Units 1/27/2020 7/29/2021 6/20/2022   Cholesterol      100 - 199 mg/dL 159 167 154   Triglycerides      0 - 149 mg/dL 30 33 42   HDL      >39 mg/dL 82 78 73   VLDL Cholesterol Timmy      5 - 40 mg/dL 6 8 10   LDL Calculated      0 - 99 mg/dL 71 81 71     [facility]  12/21/21:  Lipid panel: Total cholesterol 162, LDL 69, HDL 76, triglycerides 31. ApoB 60, Lp(a) 25.  Inflammation panel: HS CRP 0.4 LP MIGUEL ÁNGEL 2 129  Sterol balance panel: Hyper absorber of cholesterol  Diabetes panel: Normal hemoglobin A1c 5.5 insulin 6 Kacy IR 1.6 LP IR 13  Fatty acid panel: EPA 68 DHA 80 mcg/mL  Metabolic panel: Uric acid 5.2 homocysteine 10.4  Hormone panel: TSH 3.6 vitamin D " 63      IMPRESSIONS/RECOMMENDATIONS:  1. Coronary artery disease.  The patient has had prior cardiac catheterization which demonstrated nonobstructive LAD coronary artery disease in 2016.  His recent stress echo November 2019 demonstrated normal wall motion.  He did have borderline EKG changes at peak exercise.   He will continue baby aspirin.   2. Dyslipidemia.  Continue Lipitor.  He has reached goal.  3. 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  The patient is at increased risk for progressive atherosclerosis.  4. Prothrombin mutation.  The patient is at increased risk of DVT.  5. Hyperhomocystinemia.  The patient should continue with B-complex and L5 methyl folate.  6. Obstructive sleep apnea.  The patient will continue nasal CPAP.  7. Borderline hypertension.  Patient will monitor his blood pressures at home.  Our goal is a blood pressure less than 130/80. He continues on losartan 50 mg daily.  8. Anxiety disorder.  9. GERD and irritable bowel syndrome.  Stable  10.        Intermittent right bundle branch block.  The patient has demonstrated an incomplete right bundle branch block since 2018.  He had a complete right bundle branch block in 2019.  This prompted a stress echo which was negative for ischemia in 2019.  His EKG in 2020 reverted to normal.  His EKG today demonstrates normal sinus rhythm with right bundle branch block.    Summary:     Dandy is demonstrating cardiovascular stability. .      We reviewed his prevention program in detail.  We are making no substantial changes today.    We reviewed his EKGs dating back to 2016.  He had incomplete right bundle branch block in 2018.  He subsequently developed complete bundle right bundle branch block in 2019.  A stress echo in 2019 was negative for ischemia.  In 2020 he reverted back to normal conduction.  Today his EKG demonstrates right bundle branch block.  I suspect that he is having intermittent right bundle branch block.    Coronary artery disease been well worked up in the  past.  Good prevention program.  He is reached his LDL and blood pressure goals.  He is cleared to undergo orthopedic surgery.    This was a 30-minute patient encounter with greater than 50% time spent in care coordination and counseling.      Sincerely,    Nnamdi Gustafson MD    12/5/2022

## 2022-12-05 ENCOUNTER — OFFICE VISIT (OUTPATIENT)
Dept: CARDIOLOGY | Facility: CLINIC | Age: 67
End: 2022-12-05
Payer: MEDICARE

## 2022-12-05 VITALS
OXYGEN SATURATION: 99 % | DIASTOLIC BLOOD PRESSURE: 78 MMHG | SYSTOLIC BLOOD PRESSURE: 126 MMHG | WEIGHT: 163 LBS | HEIGHT: 71 IN | HEART RATE: 74 BPM | BODY MASS INDEX: 22.82 KG/M2

## 2022-12-05 DIAGNOSIS — I10 ESSENTIAL HYPERTENSION: ICD-10-CM

## 2022-12-05 DIAGNOSIS — I25.10 CORONARY ARTERY DISEASE INVOLVING NATIVE CORONARY ARTERY OF NATIVE HEART WITHOUT ANGINA PECTORIS: Primary | ICD-10-CM

## 2022-12-05 DIAGNOSIS — E78.5 DYSLIPIDEMIA: ICD-10-CM

## 2022-12-05 PROCEDURE — 99214 OFFICE O/P EST MOD 30 MIN: CPT | Performed by: INTERNAL MEDICINE

## 2022-12-05 PROCEDURE — G8752 SYS BP LESS 140: HCPCS | Performed by: INTERNAL MEDICINE

## 2022-12-05 PROCEDURE — 93000 ELECTROCARDIOGRAM COMPLETE: CPT | Performed by: INTERNAL MEDICINE

## 2022-12-05 PROCEDURE — G8754 DIAS BP LESS 90: HCPCS | Performed by: INTERNAL MEDICINE

## 2022-12-05 NOTE — LETTER
2022     Ian Phillip MD  [address]  [address], [place] [address]  [facility] [address]    Patient: Hilda Lomax  YOB: 1955  Date of Visit: 2022      Dear Dr. Phillip:    Thank you for referring Hilda Lomax to me for evaluation. Below are my notes for this consultation.    If you have questions, please do not hesitate to call me. I look forward to following your patient along with you.         Sincerely,        Nnamdi Gustafson MD        CC: No Recipients  Nnamdi Gustafson MD  2022 12:18 PM  Sign when Signing Visit    [facility]    2022    Ian Phillip MD  [address] [place], [facility] [address]  [place] [facility] [address]    Re:  HILDA LOMAX  :  1955      Dear Sami:    It was my pleasure to see your patient, Hilda Lomax, in the [facility] today.      As you know, we follow him for subclinical coronary artery disease and dyslipidemia.     The patient has been through a previous cardiac workup by Dr. Santo Tucker at [place].  A coronary calcium score was obtained in  which revealed Agatston score 174 with significant plaquing of his LAD.  This prompted a stress study which was equivocal for ischemia.  This was followed by a diagnostic left heart catheterization in 2016 which demonstrated moderate nonobstructive Mid LAD disease with negative FFR.  Medical management was indicated.    The patient has been on a prevention program over the past several years.  He is on Lipitor 10 mg daily.  His last advanced lipid panel dated 2021 revealed a total cholesterol of 162, triglycerides 31, HDL 76, LDL 69.  This is an excellent response to Lipitor 10 mg daily.  We reviewed his previous [facility] advanced lipid panel as well as his medications and supplements in detail and are making no substantial changes today.     His EKG today demonstrates an ECG finding of RBBB.  In reviewing the records he had an incomplete right  bundle branch block in 2018 and then a complete right bundle branch block in 2019.  He underwent a stress echo which was negative for microischemia or scar.  In 2020 his EKG reverted back to normal conduction pattern.  We discussed this finding in detail, it is not cause for immediate concern as he is asymptomatic.     Today he reports he offers for preoperative clearance.  He denies any chest pain shortness breath or palpitations.  EKG demonstrates normal sinus rhythm with right bundle branch block.  Previous cardiac work-ups are noted above.  He is demonstrating cardiovascular stability.  He is cleared for back fusion surgery.      PAST MEDICAL HISTORY:    Past Medical History:   Diagnosis Date   • Abnormal ECG     RBBB   • Allergic rhinitis 10/1/2019   • Anxiety    • Coronary artery disease     Coronary artery disease, cardiac catheterization in November 2016, moderate stenosis involving the mid LAD, FFR negative   • DDD (degenerative disc disease), lumbar 10/1/2019   • Dyslipidemia 4/20/2018   • Essential hypertension 4/20/2018   • GERD (gastroesophageal reflux disease)    • Hyperhomocysteinemia (CMS/HCC)    • Hyperhomocystinemia (CMS/HCC) 4/20/2018   • Hypertension    • Idiopathic peripheral neuropathy 10/26/2020   • Lipid disorder    • Obstructive sleep apnea    • Prothrombin mutation (CMS/HCC) 4/20/2018   • Varicose veins of legs 6/8/2017    Last Assessment & Plan:  Right lower leg VV  Ref Dr Wendy Quiroz at [facility]   • Vitamin D deficiency 1/10/2020     PAST SURGICAL HISTORY:  Retinal tear, prostate biopsy, hernia repair.    CURRENT MEDICATIONS:   Current Outpatient Medications   Medication Instructions   • alpha lipoic acid 200 mg, oral, Daily   • aspirin 81 mg, oral, Daily   • atorvastatin (LIPITOR) 10 mg, oral, Daily   • cholecalciferol, vitamin D3, 50 mcg (2,000 unit) tablet oral, Daily   • coenzyme Q10 (COQ10) 200 mg, oral, Daily   • doxepin (SILENOR) 3 mg, oral, Nightly PRN   • fluticasone  propionate (FLONASE) 50 mcg/actuation nasal spray 1 spray, Each Nostril, Daily   • Lactobac no.41-Bifidobact no.7 70 mg (3 billion cell) capsule oral, Daily   • losartan (COZAAR) 50 mg, oral, Daily   • MAGNESIUM ORAL 500 mg, oral, Daily   • MULTIVIT WITH MINERALS/LUTEIN (MULTIVITAMIN 50 PLUS ORAL) oral, Daily   • omega-3 fatty acids 1,000 mg capsule oral, Daily, Two capsules daily    • pantoprazole (PROTONIX) 40 mg, oral, Daily   • resveratroL 250 mg, oral, Daily   • zolpidem (AMBIEN) 5 mg, oral, Nightly PRN       SUPPLEMENTS:  Multivitamin, omega-3 fish oil, vitamin-D, Reservitol, methyl-B essentials, probiotic, coenzyme-Q10, lipoic acid, calcium.  We reviewed his supplements in detail today.    ALLERGIES:  No known drug allergies.    FAMILY HISTORY:  Mother with hypertension, congestive heart failure.  Father with myocardial infarction, hypertension.    SOCIAL HISTORY:  He is .  His wife's name is Lakeshia Presley.  He has one child.  He is an , self-employed.  He does not smoke.  Rare alcohol.  Regular exercise.    REVIEW OF SYSTEMS:  The patient has snoring, uses nasal CPAP, so he does have sleep apnea, GERD, reflux.  He has been on proton pump inhibitors for 15 years.  The remainder of his 12 point review of systems is negative.    PHYSICAL EXAMINATION:   Last is a 67-year-old male no acute distress    Vitals:    12/05/22 1131   BP: 126/78   Pulse: 74   SpO2: 99%      Wt Readings from Last 3 Encounters:   12/05/22 73.9 kg (163 lb)   06/24/22 71.2 kg (157 lb)   05/12/22 72.6 kg (160 lb)     HEENT:  Unremarkable.  Neck:  Supple, no JVD.  Lungs:  Clear to auscultation and percussion.  Cardiac:  Regular rate and rhythm.  Abdomen:  Soft, bowel sounds present, no organomegaly.  Extremities:  No edema, pulses intact.  Skin:  Warm and dry.  Neuro:  Alert and oriented X 3.    LABORATORY DATA:      EKG: Normal sinus rhythm with a right bundle branch block.  The patient has had intermittent  "right bundle branch block in the past.    Coronary Calcium score 2016 at [place]: 174 predominantly involving the mid LAD.       Cardiac catheterization 2016:  Mild to moderate non-obstructive LAD disease. FFR LAD negative.  Minor luminal irregularities LCX and RCA.  Normal LV function EF 60%.    Echocardiogram 3/14/2022:  · Left Ventricle: Normal ventricle size. Mild concentric left ventricular hypertrophy. Preserved systolic function. Estimated EF 55%. Normal septal motion. No regional wall motion abnormalities.  · Aortic Valve: Tricuspid valve. Sclerotic leaflets. No regurgitation. No stenosis. Mean gradient = 4.00 mmHg. Peak velocity = 1.49 m/s. Calculated area = 2.79 cm2.  · Mitral Valve: Normal leaflet structure and normal leaflet motion. Mild to moderate regurgitation.  · Left Atrium: Moderately dilated atrium.  · Tricuspid Valve: Normal structure. Trace regurgitation.  · Right Ventricle: Normal ventricle size. Normal systolic function.    Stress echo November 2019:  · Stress echocardiogram reveals discordant results with borderline abnormal ecg but normal wall motion.  · Response to Stress: A horizontal ST segment depression of 1 mm was noted during stress in the V5 and V6 leads, beginning at 11 minutes 50 seconds of stress, and returning to baseline by 1 min of recovery.  · Post-stress echocardiogram does not meet criteria for ischemia. All ventricular walls become hyperdynamic and the ejection fraction improves.    [facility] Advanced lipid panel April 2019 in \"media\":  Total cholesterol 153 LDL 72 HDL 77 trig, 30 APO B 72 LP(a) 64  Inflammation panel: Normal  Endothelial function panel: Normal  Diabetes panel: Normal  Metabolic panel: Normal.  Vitamin D 66 homocystine 11  Omega-3 index 10.6  Sterol panel: Normal  Renal function panel: GFR 74    Basic Lipid Panels:  Component      Latest Ref Rng & Units 1/27/2020 7/29/2021 6/20/2022   Cholesterol      100 - 199 mg/dL 159 167 154   Triglycerides      0 - " 149 mg/dL 30 33 42   HDL      >39 mg/dL 82 78 73   VLDL Cholesterol Timmy      5 - 40 mg/dL 6 8 10   LDL Calculated      0 - 99 mg/dL 71 81 71     [facility]  12/21/21:  Lipid panel: Total cholesterol 162, LDL 69, HDL 76, triglycerides 31. ApoB 60, Lp(a) 25.  Inflammation panel: HS CRP 0.4 LP MIGUEL ÁNGEL 2 129  Sterol balance panel: Hyper absorber of cholesterol  Diabetes panel: Normal hemoglobin A1c 5.5 insulin 6 Kacy IR 1.6 LP IR 13  Fatty acid panel: EPA 68 DHA 80 mcg/mL  Metabolic panel: Uric acid 5.2 homocysteine 10.4  Hormone panel: TSH 3.6 vitamin D 63      IMPRESSIONS/RECOMMENDATIONS:  1. Coronary artery disease.  The patient has had prior cardiac catheterization which demonstrated nonobstructive LAD coronary artery disease in 2016.  His recent stress echo November 2019 demonstrated normal wall motion.  He did have borderline EKG changes at peak exercise.   He will continue baby aspirin.   2. Dyslipidemia.  Continue Lipitor.  He has reached goal.  3. 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  The patient is at increased risk for progressive atherosclerosis.  4. Prothrombin mutation.  The patient is at increased risk of DVT.  5. Hyperhomocystinemia.  The patient should continue with B-complex and L5 methyl folate.  6. Obstructive sleep apnea.  The patient will continue nasal CPAP.  7. Borderline hypertension.  Patient will monitor his blood pressures at home.  Our goal is a blood pressure less than 130/80. He continues on losartan 50 mg daily.  8. Anxiety disorder.  9. GERD and irritable bowel syndrome.  Stable  10.        Intermittent right bundle branch block.  The patient has demonstrated an incomplete right bundle branch block since 2018.  He had a complete right bundle branch block in 2019.  This prompted a stress echo which was negative for ischemia in 2019.  His EKG in 2020 reverted to normal.  His EKG today demonstrates normal sinus rhythm with right bundle branch block.    Summary:     Dandy is demonstrating cardiovascular  stability. .      We reviewed his prevention program in detail.  We are making no substantial changes today.    We reviewed his EKGs dating back to 2016.  He had incomplete right bundle branch block in 2018.  He subsequently developed complete bundle right bundle branch block in 2019.  A stress echo in 2019 was negative for ischemia.  In 2020 he reverted back to normal conduction.  Today his EKG demonstrates right bundle branch block.  I suspect that he is having intermittent right bundle branch block.    Coronary artery disease been well worked up in the past.  Good prevention program.  He is reached his LDL and blood pressure goals.  He is cleared to undergo orthopedic surgery.    This was a 30-minute patient encounter with greater than 50% time spent in care coordination and counseling.      Sincerely,    Nnamdi Gustafson MD    12/5/2022

## 2023-07-12 ENCOUNTER — TELEPHONE (OUTPATIENT)
Dept: SCHEDULING | Facility: CLINIC | Age: 68
End: 2023-07-12
Payer: MEDICARE

## 2023-07-12 NOTE — TELEPHONE ENCOUNTER
Cardiac Clearance     Name of caller: Beverly     Relationship to patient: Dr. Ian Phillip Office    Name of patient: Dandy Lomax    Insurance Name: Medicare    Name of physician: Nnamdi Gustafson MD    Date of Procedure/Surgery: 7/26/23    Type of Procedure/Surgery: Knee Surgery    Name of surgeon: Dr. Vic Vega    Office contact number: [phone]    Office fax number: [phone]    Addendums  Is patient able to be cleared based on last appt on 12/5/22  If unable to clear patient, please contact patient at [phone]    Additional notes: Dr. Sosa is unable to see pt prior to surgery for CC so Beverly from his office called requesting appt. Beverly requesting pt have EKG, labs, and physical.     Beverly can be reached if needed at [phone].

## 2023-07-13 RX ORDER — LOSARTAN POTASSIUM 50 MG/1
50 TABLET ORAL DAILY
Qty: 90 TABLET | Refills: 3 | Status: SHIPPED | OUTPATIENT
Start: 2023-07-13 | End: 2024-07-10 | Stop reason: SDUPTHER

## 2023-07-13 RX ORDER — ATORVASTATIN CALCIUM 10 MG/1
10 TABLET, FILM COATED ORAL DAILY
Qty: 90 TABLET | Refills: 3 | Status: SHIPPED | OUTPATIENT
Start: 2023-07-13 | End: 2024-07-10 | Stop reason: SDUPTHER

## 2023-07-13 NOTE — TELEPHONE ENCOUNTER
Patient Name: Dandy Lomax    Caller name: Dandy    Relationship: self    Reason for call: calling to get scheduled for CC - must be done by 7/18    Callback number: [phone]

## 2023-07-14 NOTE — PROGRESS NOTES
[facility]    2023    Ian Phillip MD  [address] [place], [facility] [address]  [place] [address]    Re:  HILDA LOMAX  :  1955      Dear Sami:    It was my pleasure to see your patient, Hilda Lomax, in the [facility] today.      As you know, we follow him for subclinical coronary artery disease and dyslipidemia.     Last presents the office today for preop cardiovascular clearance.  He is cleared from a cardiovascular standpoint to proceed ahead with arthroscopic left knee surgery.    The patient has been through a previous cardiac workup by Dr. Santo Tucker at [facility].  A coronary calcium score was obtained in  which revealed Agatston score 174 with significant plaquing of his LAD.  This prompted a stress study which was equivocal for ischemia.  This was followed by a diagnostic left heart catheterization in 2016 which demonstrated moderate nonobstructive Mid LAD disease with negative FFR.  Medical management was indicated.    The patient has been on a prevention program over the past several years.  He is on Lipitor 10 mg daily.  His last advanced lipid panel dated 2021 revealed a total cholesterol of 162, triglycerides 31, HDL 76, LDL 69.  This is an excellent response to Lipitor 10 mg daily.      His EKG today demonstrates an ECG finding of RBBB.  In reviewing the records he had an incomplete right bundle branch block in  and then a complete right bundle branch block in 2019.  He underwent a stress echo which was negative for microischemia or scar.  In  his EKG reverted back to normal conduction pattern.  We discussed this finding in detail, it is not cause for immediate concern as he is asymptomatic.     Today he reports he offers for preoperative clearance.  He denies any chest pain shortness breath or palpitations.  EKG demonstrates normal sinus rhythm with right bundle branch block.  Previous cardiac work-ups are noted above.  He is  demonstrating cardiovascular stability.  He is cleared for arthroscopic left knee surgery.    PAST MEDICAL HISTORY:    Past Medical History:   Diagnosis Date   • Abnormal ECG     RBBB   • Allergic rhinitis 10/1/2019   • Anxiety    • Coronary artery disease     Coronary artery disease, cardiac catheterization in November 2016, moderate stenosis involving the mid LAD, FFR negative   • DDD (degenerative disc disease), lumbar 10/1/2019   • Dyslipidemia 4/20/2018   • Essential hypertension 4/20/2018   • GERD (gastroesophageal reflux disease)    • Hyperhomocysteinemia (CMS/HCC)    • Hyperhomocystinemia (CMS/HCC) 4/20/2018   • Hypertension    • Idiopathic peripheral neuropathy 10/26/2020   • Lipid disorder    • Obstructive sleep apnea    • Prothrombin mutation (CMS/HCC) 4/20/2018   • Varicose veins of legs 6/8/2017    Last Assessment & Plan:  Right lower leg VV  Ref Dr Wendy Quiroz at [facility]   • Vitamin D deficiency 1/10/2020     PAST SURGICAL HISTORY:  Retinal tear, prostate biopsy, hernia repair.    CURRENT MEDICATIONS:   Current Outpatient Medications   Medication Instructions   • alpha lipoic acid 200 mg, oral, Daily   • aspirin 81 mg, oral, Daily   • atorvastatin (LIPITOR) 10 mg, oral, Daily   • cholecalciferol, vitamin D3, 50 mcg (2,000 unit) tablet oral, Daily   • coenzyme Q10 (COQ10) 200 mg, oral, Daily   • doxepin (SILENOR) 3 mg, oral, Nightly PRN   • Lactobac no.41-Bifidobact no.7 70 mg (3 billion cell) capsule oral, Daily   • losartan (COZAAR) 50 mg, oral, Daily   • MAGNESIUM ORAL 500 mg, oral, Daily   • MULTIVIT WITH MINERALS/LUTEIN (MULTIVITAMIN 50 PLUS ORAL) oral, Daily   • omega-3 fatty acids 1,000 mg capsule oral, Daily, Two capsules daily    • pantoprazole (PROTONIX) 40 mg, oral, Daily   • resveratroL 250 mg, oral, Daily   • zolpidem (AMBIEN) 5 mg, oral, Nightly PRN       SUPPLEMENTS:  Multivitamin, omega-3 fish oil, vitamin-D, Reservitol, methyl-B essentials, probiotic, coenzyme-Q10, lipoic  acid, calcium.  We reviewed his supplements in detail today.    ALLERGIES:  No known drug allergies.    FAMILY HISTORY:  Mother with hypertension, congestive heart failure.  Father with myocardial infarction, hypertension.    SOCIAL HISTORY:  He is .  His wife's name is Lakeshia Presley.  He has one child.  He is an , self-employed.  He does not smoke.  Rare alcohol.  Regular exercise.    REVIEW OF SYSTEMS:  The patient has snoring, uses nasal CPAP, so he does have sleep apnea, GERD, reflux.  He has been on proton pump inhibitors for 15 years.  The remainder of his 12 point review of systems is negative.    PHYSICAL EXAMINATION:   Last is a 67-year-old male no acute distress    Vitals:    07/17/23 1128   BP: 128/76   Pulse: 69   SpO2: 99%      Wt Readings from Last 3 Encounters:   07/17/23 72.1 kg (159 lb)   12/05/22 73.9 kg (163 lb)   06/24/22 71.2 kg (157 lb)     HEENT:  Unremarkable.  Neck:  Supple, no JVD.  Lungs:  Clear to auscultation and percussion.  Cardiac:  Regular rate and rhythm.  Abdomen:  Soft, bowel sounds present, no organomegaly.  Extremities:  No edema, pulses intact.  Skin:  Warm and dry.  Neuro:  Alert and oriented X 3.    LABORATORY DATA:      EKG: Normal sinus rhythm with a right bundle branch block.  The patient has had intermittent right bundle branch block in the past.    Coronary Calcium score 2016 at [place]: 174 predominantly involving the mid LAD.       Cardiac catheterization 2016:  Mild to moderate non-obstructive LAD disease. FFR LAD negative.  Minor luminal irregularities LCX and RCA.  Normal LV function EF 60%.    Echocardiogram 3/14/2022:  · Left Ventricle: Normal ventricle size. Mild concentric left ventricular hypertrophy. Preserved systolic function. Estimated EF 55%. Normal septal motion. No regional wall motion abnormalities.  · Aortic Valve: Tricuspid valve. Sclerotic leaflets. No regurgitation. No stenosis. Mean gradient = 4.00 mmHg. Peak  "velocity = 1.49 m/s. Calculated area = 2.79 cm2.  · Mitral Valve: Normal leaflet structure and normal leaflet motion. Mild to moderate regurgitation.  · Left Atrium: Moderately dilated atrium.  · Tricuspid Valve: Normal structure. Trace regurgitation.  · Right Ventricle: Normal ventricle size. Normal systolic function.    Stress echo November 2019:  · Stress echocardiogram reveals discordant results with borderline abnormal ecg but normal wall motion.  · Response to Stress: A horizontal ST segment depression of 1 mm was noted during stress in the V5 and V6 leads, beginning at 11 minutes 50 seconds of stress, and returning to baseline by 1 min of recovery.  · Post-stress echocardiogram does not meet criteria for ischemia. All ventricular walls become hyperdynamic and the ejection fraction improves.    [facility] Advanced lipid panel April 2019 in \"media\":  Total cholesterol 153 LDL 72 HDL 77 trig, 30 APO B 72 LP(a) 64  Inflammation panel: Normal  Endothelial function panel: Normal  Diabetes panel: Normal  Metabolic panel: Normal.  Vitamin D 66 homocystine 11  Omega-3 index 10.6  Sterol panel: Normal  Renal function panel: GFR 74    Basic Lipid Panels:  Component      Latest Ref Rng & Units 1/27/2020 7/29/2021 6/20/2022   Cholesterol      100 - 199 mg/dL 159 167 154   Triglycerides      0 - 149 mg/dL 30 33 42   HDL      >39 mg/dL 82 78 73   VLDL Cholesterol Timmy      5 - 40 mg/dL 6 8 10   LDL Calculated      0 - 99 mg/dL 71 81 71     [facility]  12/21/21:  Lipid panel: Total cholesterol 162, LDL 69, HDL 76, triglycerides 31. ApoB 60, Lp(a) 25.  Inflammation panel: HS CRP 0.4 LP MIGUEL ÁNGEL 2 129  Sterol balance panel: Hyper absorber of cholesterol  Diabetes panel: Normal hemoglobin A1c 5.5 insulin 6 Kacy IR 1.6 LP IR 13  Fatty acid panel: EPA 68 DHA 80 mcg/mL  Metabolic panel: Uric acid 5.2 homocysteine 10.4  Hormone panel: TSH 3.6 vitamin D 63      IMPRESSIONS/RECOMMENDATIONS:  1. Coronary artery disease.  The patient has " had prior cardiac catheterization which demonstrated nonobstructive LAD coronary artery disease in 2016.  His recent stress echo November 2019 demonstrated normal wall motion.  He did have borderline EKG changes at peak exercise.   He will continue baby aspirin.   2. Dyslipidemia.  Continue Lipitor.  He has reached goal.  3. 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  The patient is at increased risk for progressive atherosclerosis.  4. Prothrombin mutation.  The patient is at increased risk of DVT.  5. Hyperhomocystinemia.  The patient should continue with B-complex and L5 methyl folate.  6. Obstructive sleep apnea.  The patient will continue nasal CPAP.  7. Borderline hypertension.  Patient will monitor his blood pressures at home.  Our goal is a blood pressure less than 130/80. He continues on losartan 50 mg daily.  8. Anxiety disorder.  9. GERD and irritable bowel syndrome.  Stable  10.        Intermittent right bundle branch block.  The patient has demonstrated an incomplete right bundle branch block since 2018.  He had a complete right bundle branch block in 2019.  This prompted a stress echo which was negative for ischemia in 2019.  His EKG in 2020 reverted to normal.  His EKG today demonstrates normal sinus rhythm with right bundle branch block.    Summary: Dandy is demonstrating cardiovascular stability. .      We reviewed his prevention program in detail.  We are making no substantial changes today.    We reviewed his EKGs dating back to 2016.  He had incomplete right bundle branch block in 2018.  He subsequently developed complete bundle right bundle branch block in 2019.  A stress echo in 2019 was negative for ischemia.  In 2020 he reverted back to normal conduction.  Today his EKG demonstrates right bundle branch block.  I suspect that he is having intermittent right bundle branch block.    Coronary artery disease been well worked up in the past.  Good prevention program.  He is reached his LDL and blood pressure goals.  He is  cleared to undergo orthopedic surgery.    This was a 30-minute patient encounter with greater than 50% time spent in care coordination and counseling.      Sincerely,    DEV Mendoza    7/17/2023

## 2023-07-17 ENCOUNTER — OFFICE VISIT (OUTPATIENT)
Dept: CARDIOLOGY | Facility: CLINIC | Age: 68
End: 2023-07-17
Payer: MEDICARE

## 2023-07-17 ENCOUNTER — APPOINTMENT (OUTPATIENT)
Dept: LAB | Facility: HOSPITAL | Age: 68
End: 2023-07-17
Attending: NURSE PRACTITIONER
Payer: MEDICARE

## 2023-07-17 VITALS
HEIGHT: 71 IN | OXYGEN SATURATION: 99 % | WEIGHT: 159 LBS | SYSTOLIC BLOOD PRESSURE: 128 MMHG | HEART RATE: 69 BPM | DIASTOLIC BLOOD PRESSURE: 76 MMHG | BODY MASS INDEX: 22.26 KG/M2

## 2023-07-17 DIAGNOSIS — E78.5 DYSLIPIDEMIA: ICD-10-CM

## 2023-07-17 DIAGNOSIS — Z01.810 PRE-OPERATIVE CARDIOVASCULAR EXAMINATION: Primary | ICD-10-CM

## 2023-07-17 LAB
ALBUMIN SERPL-MCNC: 4.1 G/DL (ref 3.5–5.7)
ALP SERPL-CCNC: 58 IU/L (ref 34–104)
ALT SERPL-CCNC: 17 IU/L (ref 7–52)
ANION GAP SERPL CALC-SCNC: 4 MEQ/L (ref 3–15)
AST SERPL-CCNC: 16 IU/L (ref 13–39)
BILIRUB SERPL-MCNC: 0.7 MG/DL (ref 0.3–1)
BUN SERPL-MCNC: 16 MG/DL (ref 7–25)
CALCIUM SERPL-MCNC: 9.3 MG/DL (ref 8.6–10.3)
CHLORIDE SERPL-SCNC: 104 MEQ/L (ref 98–107)
CHOLEST SERPL-MCNC: 155 MG/DL
CO2 SERPL-SCNC: 32 MEQ/L (ref 21–31)
CREAT SERPL-MCNC: 0.9 MG/DL (ref 0.7–1.3)
GFR SERPL CREATININE-BSD FRML MDRD: >60 ML/MIN/1.73M*2
GLUCOSE SERPL-MCNC: 81 MG/DL (ref 70–99)
HDLC SERPL-MCNC: 67 MG/DL
HDLC SERPL: 2.3 {RATIO}
LDLC SERPL CALC-MCNC: 82 MG/DL
NONHDLC SERPL-MCNC: 88 MG/DL
POTASSIUM SERPL-SCNC: 4.3 MEQ/L (ref 3.5–5.1)
PROT SERPL-MCNC: 6.2 G/DL (ref 6.4–8.9)
SODIUM SERPL-SCNC: 140 MEQ/L (ref 136–145)
TRIGL SERPL-MCNC: 31 MG/DL

## 2023-07-17 PROCEDURE — 36415 COLL VENOUS BLD VENIPUNCTURE: CPT

## 2023-07-17 PROCEDURE — 80061 LIPID PANEL: CPT

## 2023-07-17 PROCEDURE — G8752 SYS BP LESS 140: HCPCS | Performed by: NURSE PRACTITIONER

## 2023-07-17 PROCEDURE — G8754 DIAS BP LESS 90: HCPCS | Performed by: NURSE PRACTITIONER

## 2023-07-17 PROCEDURE — 80053 COMPREHEN METABOLIC PANEL: CPT

## 2023-07-17 PROCEDURE — 99214 OFFICE O/P EST MOD 30 MIN: CPT | Performed by: NURSE PRACTITIONER

## 2023-07-17 PROCEDURE — 93000 ELECTROCARDIOGRAM COMPLETE: CPT | Performed by: NURSE PRACTITIONER

## 2023-09-20 ENCOUNTER — TRANSCRIBE ORDERS (OUTPATIENT)
Dept: LAB | Facility: CLINIC | Age: 68
End: 2023-09-20

## 2023-09-20 ENCOUNTER — APPOINTMENT (OUTPATIENT)
Dept: LAB | Facility: CLINIC | Age: 68
End: 2023-09-20
Attending: INTERNAL MEDICINE
Payer: MEDICARE

## 2023-09-20 DIAGNOSIS — D64.9 ANEMIA, UNSPECIFIED: ICD-10-CM

## 2023-09-20 DIAGNOSIS — D64.9 ANEMIA, UNSPECIFIED: Primary | ICD-10-CM

## 2023-09-20 LAB
BASOPHILS # BLD: 0.05 K/UL (ref 0.01–0.1)
BASOPHILS NFR BLD: 1.1 %
DIFFERENTIAL METHOD BLD: ABNORMAL
EOSINOPHIL # BLD: 0.16 K/UL (ref 0.04–0.54)
EOSINOPHIL NFR BLD: 3.6 %
ERYTHROCYTE [DISTWIDTH] IN BLOOD BY AUTOMATED COUNT: 13.5 % (ref 11.6–14.4)
FERRITIN SERPL-MCNC: 13 NG/ML (ref 24–250)
HCT VFR BLDCO AUTO: 40.6 % (ref 40.1–51)
HGB BLD-MCNC: 13 G/DL (ref 13.7–17.5)
IMM GRANULOCYTES # BLD AUTO: 0.01 K/UL (ref 0–0.08)
IMM GRANULOCYTES NFR BLD AUTO: 0.2 %
IRON SATN MFR SERPL: 21 % (ref 15–45)
IRON SERPL-MCNC: 62 UG/DL (ref 35–150)
LYMPHOCYTES # BLD: 0.88 K/UL (ref 1.2–3.5)
LYMPHOCYTES NFR BLD: 20 %
MCH RBC QN AUTO: 30 PG (ref 28–33.2)
MCHC RBC AUTO-ENTMCNC: 32 G/DL (ref 32.2–36.5)
MCV RBC AUTO: 93.8 FL (ref 83–98)
MONOCYTES # BLD: 0.41 K/UL (ref 0.3–1)
MONOCYTES NFR BLD: 9.3 %
NEUTROPHILS # BLD: 2.88 K/UL (ref 1.7–7)
NEUTS SEG NFR BLD: 65.8 %
NRBC BLD-RTO: 0 %
PDW BLD AUTO: 11.2 FL (ref 9.4–12.4)
PLATELET # BLD AUTO: 200 K/UL (ref 150–350)
RBC # BLD AUTO: 4.33 M/UL (ref 4.5–5.8)
TIBC SERPL-MCNC: 302 UG/DL (ref 270–460)
UIBC SERPL-MCNC: 240 UG/DL (ref 180–360)
WBC # BLD AUTO: 4.39 K/UL (ref 3.8–10.5)

## 2023-09-20 PROCEDURE — 82728 ASSAY OF FERRITIN: CPT

## 2023-09-20 PROCEDURE — 83550 IRON BINDING TEST: CPT

## 2023-09-20 PROCEDURE — 85025 COMPLETE CBC W/AUTO DIFF WBC: CPT

## 2023-09-20 PROCEDURE — 36415 COLL VENOUS BLD VENIPUNCTURE: CPT

## 2023-11-02 ENCOUNTER — TRANSCRIBE ORDERS (OUTPATIENT)
Dept: LAB | Facility: HOSPITAL | Age: 68
End: 2023-11-02

## 2023-11-02 ENCOUNTER — APPOINTMENT (OUTPATIENT)
Dept: LAB | Facility: HOSPITAL | Age: 68
End: 2023-11-02
Attending: INTERNAL MEDICINE
Payer: MEDICARE

## 2023-11-02 DIAGNOSIS — G60.9 HEREDITARY AND IDIOPATHIC NEUROPATHY, UNSPECIFIED: ICD-10-CM

## 2023-11-02 DIAGNOSIS — I10 ESSENTIAL (PRIMARY) HYPERTENSION: Primary | ICD-10-CM

## 2023-11-02 DIAGNOSIS — I10 ESSENTIAL (PRIMARY) HYPERTENSION: ICD-10-CM

## 2023-11-02 LAB
ALBUMIN SERPL-MCNC: 4.1 G/DL (ref 3.5–5.7)
ALP SERPL-CCNC: 63 IU/L (ref 34–125)
ALT SERPL-CCNC: 21 IU/L (ref 7–52)
ANION GAP SERPL CALC-SCNC: 4 MEQ/L (ref 3–15)
AST SERPL-CCNC: 22 IU/L (ref 13–39)
BILIRUB SERPL-MCNC: 0.7 MG/DL (ref 0.3–1.2)
BUN SERPL-MCNC: 16 MG/DL (ref 7–25)
CALCIUM SERPL-MCNC: 9.2 MG/DL (ref 8.6–10.3)
CHLORIDE SERPL-SCNC: 105 MEQ/L (ref 98–107)
CO2 SERPL-SCNC: 32 MEQ/L (ref 21–31)
CREAT SERPL-MCNC: 0.9 MG/DL (ref 0.7–1.3)
FOLATE SERPL-MCNC: >20 NG/ML
GFR SERPL CREATININE-BSD FRML MDRD: >60 ML/MIN/1.73M*2
GLUCOSE SERPL-MCNC: 83 MG/DL (ref 70–99)
MAGNESIUM SERPL-MCNC: 2 MG/DL (ref 1.8–2.5)
POTASSIUM SERPL-SCNC: 4.6 MEQ/L (ref 3.5–5.1)
PROT SERPL-MCNC: 6.1 G/DL (ref 6–8.2)
SODIUM SERPL-SCNC: 141 MEQ/L (ref 136–145)
VIT B12 SERPL-MCNC: 311 PG/ML (ref 180–914)

## 2023-11-02 PROCEDURE — 36415 COLL VENOUS BLD VENIPUNCTURE: CPT

## 2023-11-02 PROCEDURE — 80053 COMPREHEN METABOLIC PANEL: CPT

## 2023-11-02 PROCEDURE — 83735 ASSAY OF MAGNESIUM: CPT

## 2023-11-02 PROCEDURE — 82746 ASSAY OF FOLIC ACID SERUM: CPT

## 2023-11-02 PROCEDURE — 82607 VITAMIN B-12: CPT

## 2023-11-30 LAB
CHOLEST SERPL-MCNC: 159 MG/DL (ref 100–199)
HDLC SERPL-MCNC: 76 MG/DL
LDLC SERPL CALC-MCNC: 74 MG/DL (ref 0–99)
TRIGL SERPL-MCNC: 36 MG/DL (ref 0–149)
VLDLC SERPL CALC-MCNC: 9 MG/DL (ref 5–40)

## 2023-12-01 NOTE — PROGRESS NOTES
[facility]  2023    Ian Phillip MD  [address] [place], [facility] [address]  [place] [address]    Re:  HILDA LOMAX  :  1955      Dear Sami:    It was my pleasure to see your patient, Hilda Lomax, in the [facility] today.      As you know, we follow him for subclinical coronary artery disease and dyslipidemia.     The patient has been through a previous cardiac workup by Dr. Santo Tucker at [facility].  A coronary calcium score was obtained in  which revealed Agatston score 174 with significant plaquing of his LAD.  This prompted a stress study which was equivocal for ischemia.  This was followed by a diagnostic left heart catheterization in 2016 which demonstrated moderate nonobstructive Mid LAD disease with negative FFR.  Medical management was indicated.    The patient has been on a prevention program over the past several years.  He is on Lipitor 10 mg daily.  His last advanced lipid panel dated 2021 revealed a total cholesterol of 162, triglycerides 31, HDL 76, LDL 69.  This is an excellent response to Lipitor 10 mg daily.    His EKG previously demonstrated RBBB.  In reviewing the records he had an incomplete right bundle branch block in  and then a complete right bundle branch block in 2019.  He underwent a stress echo which was negative for ischemia or scar.  In  his EKG reverted back to normal conduction pattern.  Today his EKG demonstrates return of right bundle branch block.    He has been through a number of orthopedic procedures.  He is status postlaminectomy about 1 year ago.  Subsequently has undergone arthroscopic knee surgery with progressive DJD of the knees bilaterally.  He is followed by [facility]    PAST MEDICAL HISTORY:    Past Medical History:   Diagnosis Date   • Abnormal ECG     RBBB   • Allergic rhinitis 10/1/2019   • Anxiety    • Coronary artery disease     Coronary artery disease, cardiac catheterization in  November 2016, moderate stenosis involving the mid LAD, FFR negative   • DDD (degenerative disc disease), lumbar 10/1/2019   • Dyslipidemia 4/20/2018   • Essential hypertension 4/20/2018   • GERD (gastroesophageal reflux disease)    • Hyperhomocysteinemia (CMS/HCC)    • Hyperhomocystinemia (CMS/HCC) 4/20/2018   • Hypertension    • Idiopathic peripheral neuropathy 10/26/2020   • Lipid disorder    • Obstructive sleep apnea    • Prothrombin mutation (CMS/HCC) 4/20/2018   • Varicose veins of legs 6/8/2017    Last Assessment & Plan:  Right lower leg VV  Ref Dr Wendy Quiroz at [facility]   • Vitamin D deficiency 1/10/2020     PAST SURGICAL HISTORY:  Retinal tear, prostate biopsy, hernia repair.  Past Surgical History:   Procedure Laterality Date   • CATARACT EXTRACTION     • COLONOSCOPY     • ESOPHAGOGASTRODUODENOSCOPY     • HAND TENDON SURGERY Right 06/2021    thumb   • LUMBAR LAMINECTOMY       CURRENT MEDICATIONS:   Current Outpatient Medications   Medication Instructions   • alpha lipoic acid 200 mg, oral, Daily   • aspirin 81 mg, oral, Daily   • atorvastatin (LIPITOR) 10 mg, oral, Daily   • cholecalciferol, vitamin D3, 50 mcg (2,000 unit) tablet oral, Daily   • coenzyme Q10 (COQ10) 200 mg, oral, Daily   • doxepin (SILENOR) 3 mg, oral, Nightly PRN   • Lactobac no.41-Bifidobact no.7 70 mg (3 billion cell) capsule oral, Daily   • losartan (COZAAR) 50 mg, oral, Daily   • MAGNESIUM ORAL 500 mg, oral, Daily   • meloxicam (MOBIC) 7.5 mg, oral, Daily   • MULTIVIT WITH MINERALS/LUTEIN (MULTIVITAMIN 50 PLUS ORAL) oral, Daily   • omega-3 fatty acids 1,000 mg capsule oral, Daily, Two capsules daily    • pantoprazole (PROTONIX) 40 mg, oral, Daily   • polysaccharide iron complex (IFEREX) 150 mg, oral, Daily   • resveratroL 250 mg, oral, Daily   • zolpidem (AMBIEN) 5 mg, oral, Nightly PRN     SUPPLEMENTS:  Multivitamin, omega-3 fish oil, vitamin-D, Reservitol, methyl-B essentials, probiotic, coenzyme-Q10, lipoic acid, calcium.   We reviewed his supplements in detail today.    ALLERGIES:  No known drug allergies.    FAMILY HISTORY:  Mother with hypertension, congestive heart failure.  Father with myocardial infarction, hypertension.    SOCIAL HISTORY:  He is .  His wife's name is Lakeshia Presley.  He has one child.  He is an , self-employed.  He does not smoke.  Rare alcohol.  Regular exercise.    REVIEW OF SYSTEMS:  The patient has snoring, uses nasal CPAP, so he does have sleep apnea, GERD, reflux.  He has been on proton pump inhibitors for 15 years.  The remainder of his 12 point review of systems is negative.    PHYSICAL EXAMINATION:   Last is a 67-year-old male no acute distress    Vitals:    12/04/23 1106   BP: 140/76   Pulse: 76   SpO2: 94%      Wt Readings from Last 3 Encounters:   12/04/23 76.2 kg (168 lb)   07/17/23 72.1 kg (159 lb)   12/05/22 73.9 kg (163 lb)     HEENT:  Unremarkable.  Neck:  Supple, no JVD.  Lungs:  Clear to auscultation and percussion.  Cardiac:  Regular rate and rhythm.  Abdomen:  Soft, bowel sounds present, no organomegaly.  Extremities:  No edema, pulses intact.  Skin:  Warm and dry.  Neuro:  Alert and oriented X 3.    LABORATORY DATA:      EKG: Normal sinus rhythm with a right bundle branch block.  The patient has had intermittent right bundle branch block in the past.    Coronary Calcium score 2016 at [place]: 174 predominantly involving the mid LAD.       Cardiac catheterization 2016:  Mild to moderate non-obstructive LAD disease. FFR LAD negative.  Minor luminal irregularities LCX and RCA.  Normal LV function EF 60%.    Echocardiogram 3/14/2022:  · Left Ventricle: Normal ventricle size. Mild concentric left ventricular hypertrophy. Preserved systolic function. Estimated EF 55%. Normal septal motion. No regional wall motion abnormalities.  · Aortic Valve: Tricuspid valve. Sclerotic leaflets. No regurgitation. No stenosis. Mean gradient = 4.00 mmHg. Peak velocity = 1.49  "m/s. Calculated area = 2.79 cm2.  · Mitral Valve: Normal leaflet structure and normal leaflet motion. Mild to moderate regurgitation.  · Left Atrium: Moderately dilated atrium.  · Tricuspid Valve: Normal structure. Trace regurgitation.  · Right Ventricle: Normal ventricle size. Normal systolic function.    Stress echo November 2019:  · Stress echocardiogram reveals discordant results with borderline abnormal ecg but normal wall motion.  · Response to Stress: A horizontal ST segment depression of 1 mm was noted during stress in the V5 and V6 leads, beginning at 11 minutes 50 seconds of stress, and returning to baseline by 1 min of recovery.  · Post-stress echocardiogram does not meet criteria for ischemia. All ventricular walls become hyperdynamic and the ejection fraction improves.    [facility] Advanced lipid panel April 2019 in \"media\":  Total cholesterol 153 LDL 72 HDL 77 trig, 30 APO B 72 LP(a) 64 nmol/L  Inflammation panel: Normal  Endothelial function panel: Normal  Diabetes panel: Normal  Metabolic panel: Normal.  Vitamin D 66 homocystine 11  Omega-3 index 10.6  Sterol panel: Normal  Renal function panel: GFR 74    [facility]  12/21/21:  Lipid panel: Total cholesterol 162, LDL 69, HDL 76, triglycerides 31. ApoB 60, Lp(a) 25 mg/dL  Inflammation panel: HS CRP 0.4 LP MIGUEL ÁNGEL 2 129  Sterol balance panel: Hyper absorber of cholesterol  Diabetes panel: Normal hemoglobin A1c 5.5 insulin 6 Kacy IR 1.6 LP IR 13  Fatty acid panel: EPA 68 DHA 80 mcg/mL  Metabolic panel: Uric acid 5.2 homocysteine 10.4  Hormone panel: TSH 3.6 vitamin D 63    Basic Lipid Panels:  Component      Latest Ref Rng 6/20/2022 7/17/2023 11/29/2023   Cholesterol      100 - 199 mg/dL 154  155  159    Triglycerides      0 - 149 mg/dL 42  31  36    HDL      >39 mg/dL 73  67  76    VLDL Cholesterol Timmy      5 - 40 mg/dL 10   9    LDL Calculated      0 - 99 mg/dL 71  82  74        IMPRESSIONS/RECOMMENDATIONS:  1. Coronary artery disease.  The patient " has had prior cardiac catheterization which demonstrated nonobstructive LAD coronary artery disease in 2016.  His recent stress echo November 2019 demonstrated normal wall motion.  He did have borderline EKG changes at peak exercise.   He will continue baby aspirin.   2. Dyslipidemia.  Continue Lipitor.  He has reached goal.  He is a hyper absorber of sterols.  He could benefit from the addition of Zetia.  3. 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  The patient is at increased risk for progressive atherosclerosis.  4. Prothrombin mutation.  The patient is at increased risk of DVT.  5. Hyperhomocystinemia.  The patient should continue with B-complex and L5 methyl folate.  6. Obstructive sleep apnea.  The patient will continue nasal CPAP.  7. Borderline hypertension.  Patient will monitor his blood pressures at home.  Our goal is a blood pressure less than 130/80. He continues on losartan 50 mg daily.  8. Anxiety disorder.  9. GERD and irritable bowel syndrome.  Stable  10.        Intermittent right bundle branch block.  The patient has demonstrated an incomplete right bundle branch block since 2018.  He had a complete right bundle branch block in 2019.  This prompted a stress echo which was negative for ischemia in 2019.  His EKG in 2020 reverted to normal.  His EKG again demonstrates a right bundle branch block.    Summary:     Dandy is demonstrating cardiovascular stability. .      We reviewed his prevention program in detail.  We are making no substantial changes today.    We reviewed his EKGs dating back to 2016.  He had incomplete right bundle branch block in 2018.  He subsequently developed complete bundle right bundle branch block in 2019.  A stress echo in 2019 was negative for ischemia.  In 2020 he reverted back to normal conduction.  His EKG recently has been persistent right bundle branch block.    Coronary artery disease been well worked up in the past.  Good prevention program.  He is reached his LDL and blood pressure  goals.     This was a 30-minute patient encounter with greater than 50% time spent in care coordination and counseling.      Sincerely,    Nnamdi Gustafson MD  12/4/2023

## 2023-12-04 ENCOUNTER — OFFICE VISIT (OUTPATIENT)
Dept: CARDIOLOGY | Facility: CLINIC | Age: 68
End: 2023-12-04
Payer: MEDICARE

## 2023-12-04 VITALS
HEART RATE: 76 BPM | BODY MASS INDEX: 23.76 KG/M2 | SYSTOLIC BLOOD PRESSURE: 132 MMHG | DIASTOLIC BLOOD PRESSURE: 78 MMHG | OXYGEN SATURATION: 94 % | WEIGHT: 168 LBS

## 2023-12-04 DIAGNOSIS — I10 ESSENTIAL HYPERTENSION: ICD-10-CM

## 2023-12-04 DIAGNOSIS — I25.10 CORONARY ARTERY DISEASE INVOLVING NATIVE CORONARY ARTERY OF NATIVE HEART WITHOUT ANGINA PECTORIS: Primary | ICD-10-CM

## 2023-12-04 DIAGNOSIS — E78.5 DYSLIPIDEMIA: ICD-10-CM

## 2023-12-04 PROCEDURE — 99214 OFFICE O/P EST MOD 30 MIN: CPT | Performed by: INTERNAL MEDICINE

## 2023-12-04 PROCEDURE — G8752 SYS BP LESS 140: HCPCS | Performed by: INTERNAL MEDICINE

## 2023-12-04 PROCEDURE — G8754 DIAS BP LESS 90: HCPCS | Performed by: INTERNAL MEDICINE

## 2023-12-04 PROCEDURE — 93000 ELECTROCARDIOGRAM COMPLETE: CPT | Performed by: INTERNAL MEDICINE

## 2023-12-04 RX ORDER — MELOXICAM 7.5 MG/1
7.5 TABLET ORAL DAILY
COMMUNITY
Start: 2023-10-23

## 2023-12-04 RX ORDER — IRON POLYSACCHARIDE COMPLEX 150 MG
150 CAPSULE ORAL DAILY
COMMUNITY
Start: 2023-09-21 | End: 2024-12-09

## 2023-12-04 NOTE — LETTER
2023     Ian Phillip MD  [address]  [place], [address] [address]  [facility] [address]    Patient: Hilda Lomax  YOB: 1955  Date of Visit: 2023      Dear Dr. Phillip:    Thank you for referring Hilda Lomax to me for evaluation. Below are my notes for this consultation.    If you have questions, please do not hesitate to call me. I look forward to following your patient along with you.         Sincerely,        Nnamdi Gustafson MD        CC: No Recipients    Nnamdi Gustafson MD  2023 11:43 AM  Signed  [facility]  2023    Ian Phillip MD  [address] [place]  [facility] [address]    Re:  HILDA LOMAX  :  1955      Dear Sami:    It was my pleasure to see your patient, Hilda Lomax, in the [facility] today.      As you know, we follow him for subclinical coronary artery disease and dyslipidemia.     The patient has been through a previous cardiac workup by Dr. Santo Tucker at [place].  A coronary calcium score was obtained in  which revealed Agatston score 174 with significant plaquing of his LAD.  This prompted a stress study which was equivocal for ischemia.  This was followed by a diagnostic left heart catheterization in 2016 which demonstrated moderate nonobstructive Mid LAD disease with negative FFR.  Medical management was indicated.    The patient has been on a prevention program over the past several years.  He is on Lipitor 10 mg daily.  His last advanced lipid panel dated 2021 revealed a total cholesterol of 162, triglycerides 31, HDL 76, LDL 69.  This is an excellent response to Lipitor 10 mg daily.    His EKG previously demonstrated RBBB.  In reviewing the records he had an incomplete right bundle branch block in 2018 and then a complete right bundle branch block in 2019.  He underwent a stress echo which was negative for ischemia or scar.  In  his EKG reverted back to normal  conduction pattern.  Today his EKG demonstrates return of right bundle branch block.    He has been through a number of orthopedic procedures.  He is status postlaminectomy about 1 year ago.  Subsequently has undergone arthroscopic knee surgery with progressive DJD of the knees bilaterally.  He is followed by the [facility]    PAST MEDICAL HISTORY:    Past Medical History:   Diagnosis Date   • Abnormal ECG     RBBB   • Allergic rhinitis 10/1/2019   • Anxiety    • Coronary artery disease     Coronary artery disease, cardiac catheterization in November 2016, moderate stenosis involving the mid LAD, FFR negative   • DDD (degenerative disc disease), lumbar 10/1/2019   • Dyslipidemia 4/20/2018   • Essential hypertension 4/20/2018   • GERD (gastroesophageal reflux disease)    • Hyperhomocysteinemia (CMS/HCC)    • Hyperhomocystinemia (CMS/HCC) 4/20/2018   • Hypertension    • Idiopathic peripheral neuropathy 10/26/2020   • Lipid disorder    • Obstructive sleep apnea    • Prothrombin mutation (CMS/HCC) 4/20/2018   • Varicose veins of legs 6/8/2017    Last Assessment & Plan:  Right lower leg VV  [facility]   • Vitamin D deficiency 1/10/2020     PAST SURGICAL HISTORY:  Retinal tear, prostate biopsy, hernia repair.  Past Surgical History:   Procedure Laterality Date   • CATARACT EXTRACTION     • COLONOSCOPY     • ESOPHAGOGASTRODUODENOSCOPY     • HAND TENDON SURGERY Right 06/2021    thumb   • LUMBAR LAMINECTOMY       CURRENT MEDICATIONS:   Current Outpatient Medications   Medication Instructions   • alpha lipoic acid 200 mg, oral, Daily   • aspirin 81 mg, oral, Daily   • atorvastatin (LIPITOR) 10 mg, oral, Daily   • cholecalciferol, vitamin D3, 50 mcg (2,000 unit) tablet oral, Daily   • coenzyme Q10 (COQ10) 200 mg, oral, Daily   • doxepin (SILENOR) 3 mg, oral, Nightly PRN   • Lactobac no.41-Bifidobact no.7 70 mg (3 billion cell) capsule oral, Daily   • losartan (COZAAR) 50 mg, oral, Daily   •  MAGNESIUM ORAL 500 mg, oral, Daily   • meloxicam (MOBIC) 7.5 mg, oral, Daily   • MULTIVIT WITH MINERALS/LUTEIN (MULTIVITAMIN 50 PLUS ORAL) oral, Daily   • omega-3 fatty acids 1,000 mg capsule oral, Daily, Two capsules daily    • pantoprazole (PROTONIX) 40 mg, oral, Daily   • polysaccharide iron complex (IFEREX) 150 mg, oral, Daily   • resveratroL 250 mg, oral, Daily   • zolpidem (AMBIEN) 5 mg, oral, Nightly PRN     SUPPLEMENTS:  Multivitamin, omega-3 fish oil, vitamin-D, Reservitol, methyl-B essentials, probiotic, coenzyme-Q10, lipoic acid, calcium.  We reviewed his supplements in detail today.    ALLERGIES:  No known drug allergies.    FAMILY HISTORY:  Mother with hypertension, congestive heart failure.  Father with myocardial infarction, hypertension.    SOCIAL HISTORY:  He is .  His wife's name is Lakeshia Presley.  He has one child.  He is an , self-employed.  He does not smoke.  Rare alcohol.  Regular exercise.    REVIEW OF SYSTEMS:  The patient has snoring, uses nasal CPAP, so he does have sleep apnea, GERD, reflux.  He has been on proton pump inhibitors for 15 years.  The remainder of his 12 point review of systems is negative.    PHYSICAL EXAMINATION:   Last is a 67-year-old male no acute distress    Vitals:    12/04/23 1106   BP: 140/76   Pulse: 76   SpO2: 94%      Wt Readings from Last 3 Encounters:   12/04/23 76.2 kg (168 lb)   07/17/23 72.1 kg (159 lb)   12/05/22 73.9 kg (163 lb)     HEENT:  Unremarkable.  Neck:  Supple, no JVD.  Lungs:  Clear to auscultation and percussion.  Cardiac:  Regular rate and rhythm.  Abdomen:  Soft, bowel sounds present, no organomegaly.  Extremities:  No edema, pulses intact.  Skin:  Warm and dry.  Neuro:  Alert and oriented X 3.    LABORATORY DATA:      EKG: Normal sinus rhythm with a right bundle branch block.  The patient has had intermittent right bundle branch block in the past.    Coronary Calcium score 2016 at [place]: 174 predominantly  "involving the mid LAD.       Cardiac catheterization 2016:  Mild to moderate non-obstructive LAD disease. FFR LAD negative.  Minor luminal irregularities LCX and RCA.  Normal LV function EF 60%.    Echocardiogram 3/14/2022:  · Left Ventricle: Normal ventricle size. Mild concentric left ventricular hypertrophy. Preserved systolic function. Estimated EF 55%. Normal septal motion. No regional wall motion abnormalities.  · Aortic Valve: Tricuspid valve. Sclerotic leaflets. No regurgitation. No stenosis. Mean gradient = 4.00 mmHg. Peak velocity = 1.49 m/s. Calculated area = 2.79 cm2.  · Mitral Valve: Normal leaflet structure and normal leaflet motion. Mild to moderate regurgitation.  · Left Atrium: Moderately dilated atrium.  · Tricuspid Valve: Normal structure. Trace regurgitation.  · Right Ventricle: Normal ventricle size. Normal systolic function.    Stress echo November 2019:  · Stress echocardiogram reveals discordant results with borderline abnormal ecg but normal wall motion.  · Response to Stress: A horizontal ST segment depression of 1 mm was noted during stress in the V5 and V6 leads, beginning at 11 minutes 50 seconds of stress, and returning to baseline by 1 min of recovery.  · Post-stress echocardiogram does not meet criteria for ischemia. All ventricular walls become hyperdynamic and the ejection fraction improves.    [facility] Advanced lipid panel April 2019 in \"media\":  Total cholesterol 153 LDL 72 HDL 77 trig, 30 APO B 72 LP(a) 64 nmol/L  Inflammation panel: Normal  Endothelial function panel: Normal  Diabetes panel: Normal  Metabolic panel: Normal.  Vitamin D 66 homocystine 11  Omega-3 index 10.6  Sterol panel: Normal  Renal function panel: GFR 74    [facility]  12/21/21:  Lipid panel: Total cholesterol 162, LDL 69, HDL 76, triglycerides 31. ApoB 60, Lp(a) 25 mg/dL  Inflammation panel: HS CRP 0.4 LP MIGUEL ÁNGEL 2 129  Sterol balance panel: Hyper absorber of cholesterol  Diabetes panel: Normal hemoglobin A1c " 5.5 insulin 6 Kacy IR 1.6 LP IR 13  Fatty acid panel: EPA 68 DHA 80 mcg/mL  Metabolic panel: Uric acid 5.2 homocysteine 10.4  Hormone panel: TSH 3.6 vitamin D 63    Basic Lipid Panels:  Component      Latest Ref Rng 6/20/2022 7/17/2023 11/29/2023   Cholesterol      100 - 199 mg/dL 154  155  159    Triglycerides      0 - 149 mg/dL 42  31  36    HDL      >39 mg/dL 73  67  76    VLDL Cholesterol Timmy      5 - 40 mg/dL 10   9    LDL Calculated      0 - 99 mg/dL 71  82  74        IMPRESSIONS/RECOMMENDATIONS:  1. Coronary artery disease.  The patient has had prior cardiac catheterization which demonstrated nonobstructive LAD coronary artery disease in 2016.  His recent stress echo November 2019 demonstrated normal wall motion.  He did have borderline EKG changes at peak exercise.   He will continue baby aspirin.   2. Dyslipidemia.  Continue Lipitor.  He has reached goal.  He is a hyper absorber of sterols.  He could benefit from the addition of Zetia.  3. 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  The patient is at increased risk for progressive atherosclerosis.  4. Prothrombin mutation.  The patient is at increased risk of DVT.  5. Hyperhomocystinemia.  The patient should continue with B-complex and L5 methyl folate.  6. Obstructive sleep apnea.  The patient will continue nasal CPAP.  7. Borderline hypertension.  Patient will monitor his blood pressures at home.  Our goal is a blood pressure less than 130/80. He continues on losartan 50 mg daily.  8. Anxiety disorder.  9. GERD and irritable bowel syndrome.  Stable  10.        Intermittent right bundle branch block.  The patient has demonstrated an incomplete right bundle branch block since 2018.  He had a complete right bundle branch block in 2019.  This prompted a stress echo which was negative for ischemia in 2019.  His EKG in 2020 reverted to normal.  His EKG again demonstrates a right bundle branch block.    Summary:     Dandy is demonstrating cardiovascular stability. .      We reviewed  his prevention program in detail.  We are making no substantial changes today.    We reviewed his EKGs dating back to 2016.  He had incomplete right bundle branch block in 2018.  He subsequently developed complete bundle right bundle branch block in 2019.  A stress echo in 2019 was negative for ischemia.  In 2020 he reverted back to normal conduction.  His EKG recently has been persistent right bundle branch block.    Coronary artery disease been well worked up in the past.  Good prevention program.  He is reached his LDL and blood pressure goals.     This was a 30-minute patient encounter with greater than 50% time spent in care coordination and counseling.      Sincerely,    Nnamdi Gustafson MD  12/4/2023

## 2024-01-01 NOTE — PROGRESS NOTES
[facility]    2020      Re:  DANDY LOMAX  :  1955    Dear Sami:    It was my pleasure to speak with your patient, Dandy Lomax, in the [facility] today.      As you know, he was referred for follow-up therapy concerning his subclinical coronary artery disease and dyslipidemia.    The patient has been through a previous cardiac workup by Dr. Santo Tucker at [place].  A coronary calcium score was obtained in  which revealed Agatston score 174 with significant plaquing of his LAD.  This prompted a stress study which was equivocal for ischemia.  This was followed by a diagnostic left heart catheterization in 2016 which demonstrated moderate nonobstructive Mid LAD disease with negative FFR.  Medical management was indicated.    The patient has been on a prevention program over the past several years.  He is on Lipitor 10 mg daily.  We are awaiting his lipid panel from today.     On his advanced lipid panel in  his bioinflammatory markers including HSCRP and LPPLA-2 were normal.  His endothelial function was normal.  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  He has a prothrombin mutation which increases his risk for venous thrombosis.  His homocystine level is elevated at 11.  There was no evidence of insulin resistance.  His ferritin levels are low.  His omega-3 index was10.9.    We reviewed his medications and supplements in detail and are making no substantial changes today.  He will continue on with prevention program as outlined above.    PAST MEDICAL HISTORY:    Past Medical History:   Diagnosis Date   • Abnormal ECG     RBBB   • Allergic rhinitis 10/1/2019   • Anxiety    • Coronary artery disease     Coronary artery disease, cardiac catheterization in 2016, moderate stenosis involving the mid LAD, FFR negative   • DDD (degenerative disc disease), lumbar 10/1/2019   • Dyslipidemia  4/20/2018   • Essential hypertension 4/20/2018   • GERD (gastroesophageal reflux disease)    • Hyperhomocysteinemia (CMS/HCC)    • Hyperhomocystinemia (CMS/HCC) 4/20/2018   • Hypertension    • Idiopathic peripheral neuropathy 10/26/2020   • Lipid disorder    • Obstructive sleep apnea    • Prothrombin mutation (CMS/HCC) 4/20/2018   • Varicose veins of legs 6/8/2017    Last Assessment & Plan:  Right lower leg VV  [facility] Dr Wendy [facility]iro[facility] at [facility]   • Vitamin D deficiency 1/10/2020       PAST SURGICAL HISTORY:  Retinal tear, prostate biopsy, hernia repair.    CURRENT MEDICATIONS:   Current Outpatient Medications   Medication Instructions   • alpha lipoic acid 200 mg, oral, Daily   • aspirin (ASPIR-81) 81 mg, oral, Daily   • atorvastatin (LIPITOR) 10 mg, oral, Daily   • cholecalciferol, vitamin D3, (VITAMIN D3) 2,000 unit tablet oral, Daily   • coenzyme Q10 (CO Q-10) 200 mg, oral, Daily   • diazePAM (VALIUM) 2 mg, oral, As needed   • Lactobac no.41-Bifidobact no.7 (PROBIOTIC-10) 70 mg (3 billion cell) capsule oral, Daily   • losartan (COZAAR) 50 mg, oral, Daily   • magnesium 250 mg, oral, Daily   • MULTIVIT WITH MINERALS/LUTEIN (MULTIVITAMIN 50 PLUS ORAL) oral, Daily   • omega-3 fatty acids 1,000 mg capsule oral, Daily, Two capsules daily    • pantoprazole (PROTONIX) 40 mg, oral, Daily   • resveratrol 250 mg, oral, Daily   • zolpidem (AMBIEN) 5 mg, oral, Daily       SUPPLEMENTS:  Multivitamin, omega-3 fish oil, vitamin-D, Reservitol, methyl-B essentials, probiotic, coenzyme-Q10, lipoic acid, calcium.  We reviewed his supplements in detail today.    ALLERGIES:  No known drug allergies.    FAMILY HISTORY:  Mother with hypertension, congestive heart failure.  Father with myocardial infarction, hypertension.    SOCIAL HISTORY:  He is .  His wife's name is Lakeshia Presley.  He has one child.  He is an , self-employed.  He does not smoke.  Rare alcohol.  Regular exercise.    REVIEW OF  "SYSTEMS:  The patient has snoring, uses nasal CPAP, so he does have sleep apnea, GERD, reflux.  He has been on proton pump inhibitors for 15 years.  The remainder of his 12 point review of systems is negative.    PHYSICAL EXAMINATION:   Vitals:    11/20/20 1129   BP: 130/80   Pulse: 64   Resp: 16   SpO2: 99%        Wt Readings from Last 3 Encounters:   11/20/20 72.6 kg (160 lb)   10/21/20 74.6 kg (164 lb 6.4 oz)   01/10/20 73.1 kg (161 lb 3.2 oz)       HEENT:  Unremarkable.  Neck:  Supple, no JVD.  Lungs:  Clear to auscultation and percussion.  Cardiac:  Regular rate and rhythm.  Abdomen:  Soft, bowel sounds present, no organomegaly.  Extremities:  No edema, pulses intact.  Skin:  Warm and dry.  Neuro:  Alert and oriented X 3.    LABORATORY DATA:      EKG:      Coronary Calcium score 2016 at [place]: 174 predominantly involving the mid LAD.  Valvular     Cardiac catheterization 2016:  Mild to moderate non-obstructive LAD disease. FFR LAD negative.  Minor luminal irregularities LCX and RCA.  Normal LV function EF 60%.    Stress echo November 2019:  · Stress echocardiogram reveals discordant results with abnormal ecg but normal wall motion.  · Response to Stress: A horizontal ST segment depression of 1 mm was noted during stress in the V5 and V6 leads, beginning at 11 minutes 50 seconds of stress, and returning to baseline by 1 min of recovery.  · Post-stress echocardiogram does not meet criteria for ischemia. All ventricular walls become hyperdynamic and the ejection fraction improves.  ·   [facility] Advanced lipid panel April 2019 in \"media\":  Total cholesterol 153 LDL 72 HDL 77 trig, 30 APO B 72 LP(a) 64  Inflammation panel: Normal  Endothelial function panel: Normal  Diabetes panel: Normal  Metabolic panel: Normal.  Vitamin D 66 homocystine 11  Omega-3 index 10.6  Sterol panel: Normal  Renal function panel: GFR 74    Basic Lipid Panels:  Component      Latest Ref Rng & Units 12/11/2017 1/3/2019 10/31/2019 " 1/27/2020   VLDL Cholesterol Timmy      5 - 40 mg/dL  7  6   HDL      >39 mg/dL 74 76 75 82   Triglycerides      0 - 149 mg/dL 24 (L) 36 37 30   LDL Calculated      0 - 99 mg/dL 74 58 76 71   Cholesterol      100 - 199 mg/dL 153 141 162 159       IMPRESSIONS/RECOMMENDATIONS:  1. Coronary artery disease.  The patient has had prior cardiac catheterization which demonstrated nonobstructive LAD coronary artery disease.  His recent stress echo November 2019 demonstrated normal wall motion.  He did have borderline EKG changes at peak exercise. The patient needs an aggressive risk factor modification program.  He will continue baby aspirin.   2. Dyslipidemia.  Continue Lipitor.  He has reached goal.  3. 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  The patient is at increased risk for progressive atherosclerosis.  4. Prothrombin mutation.  The patient is at increased risk of DVT.  5. Hyperhomocystinemia.  The patient should continue with B-complex and L5 methyl folate.  6. Obstructive sleep apnea.  The patient will continue nasal CPAP.  7. Borderline hypertension.  Patient will monitor his blood pressures at home.  Our goal is a blood pressure less than 130/80.  He continues on losartan 50 mg daily.  8. Anxiety disorder.  9. GERD and irritable bowel syndrome.  Stable    Summary: Dandy is demonstrating cardiovascular stability. .  We reviewed his prevention program in detail.  We are making no substantial changes today.  We are waiting his a repeat lipid panel which was performed today.    This was a 30-minute patient encounter with greater than 50% time spent in care coordination and counseling.      Sincerely,    Nnamdi Gustafson MD  11/20/2020               You can access the FollowMyHealth Patient Portal offered by [facility] by registering at the following website: [email or web address]. By joining [email or web address]’s FollowMyHealth portal, you will also be able to view your health information using other applications (apps) compatible with our system.

## 2024-01-18 NOTE — TELEPHONE ENCOUNTER
Please see previous note pt is requesting lab results to be mailed to his home. I see on 5/4/18 hdl results were scanned in chart. Can someone check with Dr. FORD if it is ok to send results to patient, not sure if he reviewed results with patient. ty    None

## 2024-07-10 RX ORDER — LOSARTAN POTASSIUM 50 MG/1
50 TABLET ORAL DAILY
Qty: 90 TABLET | Refills: 3 | Status: SHIPPED | OUTPATIENT
Start: 2024-07-10 | End: 2025-07-10

## 2024-07-10 RX ORDER — ATORVASTATIN CALCIUM 10 MG/1
10 TABLET, FILM COATED ORAL DAILY
Qty: 90 TABLET | Refills: 3 | Status: SHIPPED | OUTPATIENT
Start: 2024-07-10 | End: 2025-07-10

## 2024-07-10 NOTE — TELEPHONE ENCOUNTER
Medicine Refill Insert    Name of Patient: Dandy Lomax    Caller's name/Relationship: kasia Davis number: [phone]     Medication Name, Dosage, Supply:     losartan (COZAAR) 50 mg tablet   atorvastatin (LIPITOR) 10 mg tablet 90#     Quantity left: few left. [place]     Pharmacy: [facility]       Medicine Refill Request    Last Office Visit: Visit date not found   Last Consult Visit: Visit date not found  Last Telemedicine Visit: Visit date not found    Next Appointment: Visit date not found      Current Outpatient Medications:     alpha lipoic acid 200 mg capsule, Take 200 mg by mouth once daily., Disp: , Rfl:     aspirin 81 mg enteric coated tablet, Take 81 mg by mouth once daily., Disp: , Rfl:     atorvastatin (LIPITOR) 10 mg tablet, Take 1 tablet (10 mg total) by mouth daily., Disp: 90 tablet, Rfl: 3    cholecalciferol, vitamin D3, 50 mcg (2,000 unit) tablet, Take by mouth daily., Disp: , Rfl:     coenzyme Q10 (COQ10) 200 mg capsule, Take 200 mg by mouth once daily., Disp: , Rfl:     doxepin (SILENOR) 3 mg tablet, Take 3 mg by mouth nightly as needed., Disp: , Rfl:     Lactobac no.41-Bifidobact no.7 70 mg (3 billion cell) capsule, Take by mouth daily., Disp: , Rfl:     losartan (COZAAR) 50 mg tablet, Take 1 tablet (50 mg total) by mouth daily., Disp: 90 tablet, Rfl: 3    MAGNESIUM ORAL, Take 500 mg by mouth daily., Disp: , Rfl:     meloxicam (MOBIC) 7.5 mg tablet, Take 7.5 mg by mouth daily., Disp: , Rfl:     MULTIVIT WITH MINERALS/LUTEIN (MULTIVITAMIN 50 PLUS ORAL), Take by mouth once daily., Disp: , Rfl:     omega-3 fatty acids 1,000 mg capsule, Take by mouth once daily. Two capsules daily, Disp: , Rfl:     pantoprazole (PROTONIX) 40 mg EC tablet, Take 40 mg by mouth daily.  , Disp: , Rfl:     polysaccharide iron complex (IFEREX) 150 mg iron capsule, Take 150 mg by mouth daily., Disp: , Rfl:     resveratrol 250 mg capsule, Take 250 mg by mouth daily.  , Disp: , Rfl:      zolpidem (AMBIEN) 5 mg tablet, Take 1 tablet (5 mg total) by mouth nightly as needed for sleep., Disp: 30 tablet, Rfl: 0      BP Readings from Last 3 Encounters:   12/04/23 132/78   07/17/23 128/76   12/05/22 126/78       Recent Lab results:  Lab Results   Component Value Date    CHOL 159 11/29/2023   ,   Lab Results   Component Value Date    HDL 76 11/29/2023   ,   Lab Results   Component Value Date    LDLCALC 74 11/29/2023   ,   Lab Results   Component Value Date    TRIG 36 11/29/2023        Lab Results   Component Value Date    GLUCOSE 83 11/02/2023   ,   Lab Results   Component Value Date    HGBA1C 5.4 12/12/2016         Lab Results   Component Value Date    CREATININE 0.9 11/02/2023       Lab Results   Component Value Date    TSH 1.64 08/18/2022           Lab Results   Component Value Date    HGBA1C 5.4 12/12/2016

## 2024-11-18 LAB
ALBUMIN SERPL-MCNC: 4.2 G/DL (ref 3.9–4.9)
ALBUMIN SERPL-MCNC: 4.2 G/DL (ref 3.9–4.9)
ALP SERPL-CCNC: 79 IU/L (ref 44–121)
ALP SERPL-CCNC: 79 IU/L (ref 44–121)
ALT SERPL-CCNC: 22 IU/L (ref 0–44)
ALT SERPL-CCNC: 22 IU/L (ref 0–44)
AST SERPL-CCNC: 20 IU/L (ref 0–40)
AST SERPL-CCNC: 23 IU/L (ref 0–40)
BILIRUB SERPL-MCNC: 0.6 MG/DL (ref 0–1.2)
BILIRUB SERPL-MCNC: 0.7 MG/DL (ref 0–1.2)
BUN SERPL-MCNC: 19 MG/DL (ref 8–27)
BUN SERPL-MCNC: 20 MG/DL (ref 8–27)
BUN/CREAT SERPL: 18 (ref 10–24)
BUN/CREAT SERPL: 20 (ref 10–24)
CALCIUM SERPL-MCNC: 8.9 MG/DL (ref 8.6–10.2)
CALCIUM SERPL-MCNC: 9.1 MG/DL (ref 8.6–10.2)
CHLORIDE SERPL-SCNC: 102 MMOL/L (ref 96–106)
CHLORIDE SERPL-SCNC: 102 MMOL/L (ref 96–106)
CHOLEST SERPL-MCNC: 153 MG/DL (ref 100–199)
CO2 SERPL-SCNC: 25 MMOL/L (ref 20–29)
CO2 SERPL-SCNC: 26 MMOL/L (ref 20–29)
CREAT SERPL-MCNC: 1.02 MG/DL (ref 0.76–1.27)
CREAT SERPL-MCNC: 1.03 MG/DL (ref 0.76–1.27)
EGFRCR SERPLBLD CKD-EPI 2021: 78 ML/MIN/1.73
EGFRCR SERPLBLD CKD-EPI 2021: 79 ML/MIN/1.73
GLOBULIN SER CALC-MCNC: 2 G/DL (ref 1.5–4.5)
GLOBULIN SER CALC-MCNC: 2 G/DL (ref 1.5–4.5)
GLUCOSE SERPL-MCNC: 94 MG/DL (ref 70–99)
GLUCOSE SERPL-MCNC: 96 MG/DL (ref 70–99)
HDLC SERPL-MCNC: 80 MG/DL
LDLC SERPL CALC-MCNC: 64 MG/DL (ref 0–99)
POTASSIUM SERPL-SCNC: 4.3 MMOL/L (ref 3.5–5.2)
POTASSIUM SERPL-SCNC: 4.8 MMOL/L (ref 3.5–5.2)
PROT SERPL-MCNC: 6.2 G/DL (ref 6–8.5)
PROT SERPL-MCNC: 6.2 G/DL (ref 6–8.5)
SODIUM SERPL-SCNC: 140 MMOL/L (ref 134–144)
SODIUM SERPL-SCNC: 142 MMOL/L (ref 134–144)
SPECIMEN STATUS: NORMAL
TRIGL SERPL-MCNC: 35 MG/DL (ref 0–149)
VLDLC SERPL CALC-MCNC: 9 MG/DL (ref 5–40)

## 2024-11-27 NOTE — TELEPHONE ENCOUNTER
11-21  160/74    11-22  150/75  11-23   137/63  11-25  137/75  11-26  137/71  11-27  133/65    Current BP reading. Pt did not write his HR down    Let patient know order is in his chart

## 2024-11-30 NOTE — TELEPHONE ENCOUNTER
Pt wanted to know if you could call in another inhaler for him.  The breo ellipta med that was called in is too expensive for him.     hard copy, drawn during this pregnancy

## 2024-12-02 PROBLEM — J30.9 ALLERGIC RHINITIS: Status: RESOLVED | Noted: 2019-10-01 | Resolved: 2024-12-02

## 2024-12-02 PROBLEM — M65.341 TRIGGER RING FINGER OF RIGHT HAND: Status: RESOLVED | Noted: 2018-06-13 | Resolved: 2024-12-02

## 2024-12-02 PROBLEM — K64.9 HEMORRHOIDS: Status: RESOLVED | Noted: 2021-04-22 | Resolved: 2024-12-02

## 2024-12-02 PROBLEM — D50.9 IRON DEFICIENCY ANEMIA: Status: ACTIVE | Noted: 2022-12-13

## 2024-12-02 PROBLEM — E61.1 IRON DEFICIENCY: Status: ACTIVE | Noted: 2022-08-29

## 2024-12-02 PROBLEM — M54.16 LUMBAR RADICULOPATHY: Status: RESOLVED | Noted: 2019-10-22 | Resolved: 2024-12-02

## 2024-12-02 PROBLEM — M25.562 PAIN IN BOTH KNEES: Status: ACTIVE | Noted: 2022-12-13

## 2024-12-02 PROBLEM — H90.3 SENSORINEURAL HEARING LOSS OF BOTH EARS: Status: ACTIVE | Noted: 2024-12-02

## 2024-12-02 PROBLEM — K11.5 SALIVARY STONES: Status: RESOLVED | Noted: 2020-10-26 | Resolved: 2024-12-02

## 2024-12-02 PROBLEM — R53.1 WEAKNESS: Status: RESOLVED | Noted: 2021-01-12 | Resolved: 2024-12-02

## 2024-12-02 PROBLEM — L30.9 DERMATITIS: Status: RESOLVED | Noted: 2020-01-10 | Resolved: 2024-12-02

## 2024-12-02 PROBLEM — K44.9 HERNIA, HIATAL: Status: RESOLVED | Noted: 2021-07-27 | Resolved: 2024-12-02

## 2024-12-02 PROBLEM — M25.561 PAIN IN BOTH KNEES: Status: ACTIVE | Noted: 2022-12-13

## 2024-12-02 PROBLEM — G47.00 INSOMNIA: Status: RESOLVED | Noted: 2017-06-08 | Resolved: 2024-12-02

## 2024-12-02 PROBLEM — G60.9 IDIOPATHIC PERIPHERAL NEUROPATHY: Status: RESOLVED | Noted: 2020-10-26 | Resolved: 2024-12-02

## 2024-12-02 PROBLEM — B02.9 HERPES ZOSTER INVOLVING SACRAL DERMATOME: Status: ACTIVE | Noted: 2024-12-02

## 2024-12-02 PROBLEM — B00.9 HSV-2 INFECTION: Status: ACTIVE | Noted: 2022-12-13

## 2024-12-02 NOTE — PROGRESS NOTES
[facility]  2024    Ian Phillip MD  [address] [place], [facility] [address]  [place] [address]    Re:  HILDA LOMAX  :  1955      Dear Sami:    It was my pleasure to see your patient, Hilda Lomax, in the [facility] today.      As you know, we follow him for subclinical coronary artery disease and dyslipidemia.     The patient has been through a previous cardiac workup by Dr. Santo Tucker at [facility].  A coronary calcium score was obtained in  which revealed Agatston score 174 with significant plaquing of his LAD.  This prompted a stress study which was equivocal for ischemia.  This was followed by a diagnostic left heart catheterization in 2016 which demonstrated moderate nonobstructive Mid LAD disease with negative FFR.  Medical management was indicated.    The patient has been on a prevention program over the past several years.  He is on Lipitor 10 mg daily..  His last lipid panel 2024 revealed total cholesterol 153 LDL 64 HDL 80 triglycerides 35.  This is an excellent response to Lipitor 10 mg daily.    His EKG previously demonstrated RBBB.  In reviewing the records he had an incomplete right bundle branch block in  and then a complete right bundle branch block in .  He underwent a stress echo which was negative for ischemia or scar.  In  his EKG reverted back to normal conduction pattern.  Today his EKG demonstrates return of right bundle branch block.    He has been through a number of orthopedic procedures.  He is status postlaminectomy about 1 year ago.  Subsequently has undergone arthroscopic knee surgery with progressive DJD of the knees bilaterally.  He is followed by the [facility]    PAST MEDICAL HISTORY:    Past Medical History:   Diagnosis Date    Abnormal ECG     RBBB    Allergic rhinitis 10/1/2019    Anxiety     Coronary artery disease     Coronary artery disease, cardiac catheterization in 2016, moderate  stenosis involving the mid LAD, FFR negative    DDD (degenerative disc disease), lumbar 10/1/2019    Dyslipidemia 4/20/2018    Essential hypertension 4/20/2018    GERD (gastroesophageal reflux disease)     Hyperhomocysteinemia (CMS/[facility])     Hyperhomocystinemia 4/20/2018    Hypertension     Idiopathic peripheral neuropathy 10/26/2020    Lipid disorder     Obstructive sleep apnea     Prothrombin mutation (CMS/HCC) 4/20/2018    Varicose veins of legs 6/8/2017    Last Assessment & Plan:  Right lower leg VV  Ref Dr Wendy Quirzo at [facility]    Vitamin D deficiency 1/10/2020     PAST SURGICAL HISTORY:  Retinal tear, prostate biopsy, hernia repair.  Past Surgical History   Procedure Laterality Date    Cataract extraction      Colonoscopy      Esophagogastroduodenoscopy      Hand tendon surgery Right 06/2021    thumb    Lumbar laminectomy       CURRENT MEDICATIONS:   Current Outpatient Medications   Medication Instructions    alpha lipoic acid 200 mg, Daily    aspirin 81 mg, Daily    atorvastatin (LIPITOR) 10 mg, oral, Daily    cholecalciferol, vitamin D3, 50 mcg (2,000 unit) tablet Daily    coenzyme Q10 (COQ10) 200 mg, Daily    doxepin (SILENOR) 3 mg, Nightly PRN    Lactobac no.41-Bifidobact no.7 70 mg (3 billion cell) capsule Daily    losartan (COZAAR) 50 mg, oral, Daily    MAGNESIUM ORAL 500 mg, Daily    meloxicam (MOBIC) 7.5 mg, Daily    MULTIVIT WITH MINERALS/LUTEIN (MULTIVITAMIN 50 PLUS ORAL) Daily    omega-3 fatty acids 1,000 mg capsule Daily    pantoprazole (PROTONIX) 40 mg, Daily    polysaccharide iron complex (IFEREX) 150 mg, Daily    resveratroL 250 mg, Daily    zolpidem (AMBIEN) 5 mg, oral, Nightly PRN     SUPPLEMENTS:  Multivitamin, omega-3 fish oil, vitamin-D, Reservitol, methyl-B essentials, probiotic, coenzyme-Q10, lipoic acid, calcium.  We reviewed his supplements in detail today.    ALLERGIES:  No known drug allergies.    FAMILY HISTORY:  Mother with hypertension, congestive heart failure.  Father  with myocardial infarction, hypertension.    SOCIAL HISTORY:  He is .  His wife's name is Lakeshia Presley.  He has one child.  He is an , self-employed.  He does not smoke.  Rare alcohol.  Regular exercise.    REVIEW OF SYSTEMS:     PHYSICAL EXAMINATION:       Vitals:    12/09/24 1108   BP: 130/82   Pulse: 74   SpO2: 98%        Wt Readings from Last 3 Encounters:   12/09/24 74.8 kg (165 lb)   12/04/23 76.2 kg (168 lb)   07/17/23 72.1 kg (159 lb)       BP Readings from Last 3 Encounters:   12/09/24 130/82   12/04/23 132/78   07/17/23 128/76      HEENT:  Unremarkable.  Neck:  Supple, no JVD.  Lungs:  Clear to auscultation and percussion.  Cardiac:  Regular rate and rhythm.  Abdomen:  Soft, bowel sounds present, no organomegaly.  Extremities:  No edema, pulses intact.  Skin:  Warm and dry.  Neuro:  Alert and oriented X 3.    LABORATORY DATA:      EKG: Normal sinus rhythm with a right bundle branch block.  The patient has had intermittent right bundle branch block in the past.    Coronary Calcium score 2016 at [place]: 174 predominantly involving the mid LAD.       Cardiac catheterization 2016:  Mild to moderate non-obstructive LAD disease. FFR LAD negative.  Minor luminal irregularities LCX and RCA.  Normal LV function EF 60%.    Echocardiogram 3/14/2022:  Left Ventricle: Normal ventricle size. Mild concentric left ventricular hypertrophy. Preserved systolic function. Estimated EF 55%. Normal septal motion. No regional wall motion abnormalities.  Aortic Valve: Tricuspid valve. Sclerotic leaflets. No regurgitation. No stenosis. Mean gradient = 4.00 mmHg. Peak velocity = 1.49 m/s. Calculated area = 2.79 cm2.  Mitral Valve: Normal leaflet structure and normal leaflet motion. Mild to moderate regurgitation.  Left Atrium: Moderately dilated atrium.  Tricuspid Valve: Normal structure. Trace regurgitation.  Right Ventricle: Normal ventricle size. Normal systolic function.    Stress echo  "November 2019:  Stress echocardiogram reveals discordant results with borderline abnormal ecg but normal wall motion.  Response to Stress: A horizontal ST segment depression of 1 mm was noted during stress in the V5 and V6 leads, beginning at 11 minutes 50 seconds of stress, and returning to baseline by 1 min of recovery.  Post-stress echocardiogram does not meet criteria for ischemia. All ventricular walls become hyperdynamic and the ejection fraction improves.    [facility] Advanced lipid panel April 2019 in \"media\":  Total cholesterol 153 LDL 72 HDL 77 trig, 30 APO B 72 LP(a) 64 nmol/L  Inflammation panel: Normal  Endothelial function panel: Normal  Diabetes panel: Normal  Metabolic panel: Normal.  Vitamin D 66 homocystine 11  Omega-3 index 10.6  Sterol panel: Normal  Renal function panel: GFR 74    [facility]  12/21/21:  Lipid panel: Total cholesterol 162, LDL 69, HDL 76, triglycerides 31. ApoB 60, Lp(a) 25 mg/dL  Inflammation panel: HS CRP 0.4 LP MIGUEL ÁNGEL 2 129  Sterol balance panel: Hyper absorber of cholesterol  Diabetes panel: Normal hemoglobin A1c 5.5 insulin 6 Kacy IR 1.6 LP IR 13  Fatty acid panel: EPA 68 DHA 80 mcg/mL  Metabolic panel: Uric acid 5.2 homocysteine 10.4  Hormone panel: TSH 3.6 vitamin D 63    Basic Lipid Panels:  Component      Latest Ref Rng 6/20/2022 7/17/2023 11/29/2023 11/18/2024   Cholesterol      100 - 199 mg/dL 154  155  159  153    Triglycerides      0 - 149 mg/dL 42  31  36  35    HDL      >39 mg/dL 73  67  76  80    VLDL Cholesterol Timmy      5 - 40 mg/dL 10   9  9    LDL Calculated      0 - 99 mg/dL 71  82  74  64            IMPRESSIONS/RECOMMENDATIONS:  1. Coronary artery disease.  The patient has had prior cardiac catheterization which demonstrated nonobstructive LAD coronary artery disease in 2016.  His recent stress echo November 2019 demonstrated normal wall motion.  He did have borderline EKG changes at peak exercise.   He will continue baby aspirin.     2. Dyslipidemia.  " Continue Lipitor.  He has reached goal.  He is a hyper absorber of sterols.  He could benefit from the addition of Zetia.    3. 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  The patient is at increased risk for progressive atherosclerosis.    4. Prothrombin mutation.  The patient is at increased risk of DVT.    5. Hyperhomocystinemia.  The patient should continue with B-complex and L5 methyl folate.    6. Obstructive sleep apnea.  The patient will continue nasal CPAP.    7. Borderline hypertension.  Patient will monitor his blood pressures at home.  Our goal is a blood pressure less than 130/80. He continues on losartan 50 mg daily.    8. Anxiety disorder.    9. GERD and irritable bowel syndrome.  Stable    10.        Intermittent right bundle branch block.  The patient has demonstrated an incomplete right bundle branch block since 2018.  He had a complete right bundle branch block in 2019.  This prompted a stress echo which was negative for ischemia in 2019.  His EKG in 2020 reverted to normal.  His EKG again demonstrates a right bundle branch block.    Summary:     Dandy is demonstrating cardiovascular stability. .      We reviewed his prevention program in detail.  We are making no substantial changes today.    We reviewed his EKGs dating back to 2016.  He had incomplete right bundle branch block in 2018.  He subsequently developed complete bundle right bundle branch block in 2019.  A stress echo in 2019 was negative for ischemia.  In 2020 he reverted back to normal conduction.  His EKG recently has been persistent right bundle branch block.    Coronary artery disease been well worked up in the past.  Good prevention program.  He is reached his LDL and blood pressure goals.     This was a 30-minute patient encounter with greater than 50% time spent in care coordination and counseling.      Sincerely,    Nnamdi Gustafson MD  12/9/2024

## 2024-12-09 ENCOUNTER — OFFICE VISIT (OUTPATIENT)
Dept: CARDIOLOGY | Facility: CLINIC | Age: 69
End: 2024-12-09
Payer: MEDICARE

## 2024-12-09 VITALS
OXYGEN SATURATION: 98 % | WEIGHT: 165 LBS | BODY MASS INDEX: 23.62 KG/M2 | DIASTOLIC BLOOD PRESSURE: 82 MMHG | SYSTOLIC BLOOD PRESSURE: 130 MMHG | HEART RATE: 74 BPM | HEIGHT: 70 IN

## 2024-12-09 DIAGNOSIS — I25.10 CORONARY ARTERY DISEASE INVOLVING NATIVE CORONARY ARTERY OF NATIVE HEART WITHOUT ANGINA PECTORIS: Primary | ICD-10-CM

## 2024-12-09 PROBLEM — G47.21 CIRCADIAN RHYTHM SLEEP DISORDER, DELAYED SLEEP PHASE TYPE: Status: ACTIVE | Noted: 2023-12-07

## 2024-12-09 LAB
ATRIAL RATE: 60
P AXIS: 85
PR INTERVAL: 154
QRS DURATION: 124
QT INTERVAL: 426
QTC CALCULATION(BAZETT): 426
R AXIS: 86
T WAVE AXIS: 71
VENTRICULAR RATE: 60

## 2024-12-09 PROCEDURE — 99213 OFFICE O/P EST LOW 20 MIN: CPT | Performed by: INTERNAL MEDICINE

## 2024-12-09 PROCEDURE — 93000 ELECTROCARDIOGRAM COMPLETE: CPT | Performed by: INTERNAL MEDICINE

## 2024-12-09 PROCEDURE — G8752 SYS BP LESS 140: HCPCS | Performed by: INTERNAL MEDICINE

## 2024-12-09 PROCEDURE — G8754 DIAS BP LESS 90: HCPCS | Performed by: INTERNAL MEDICINE

## 2024-12-09 NOTE — LETTER
2024     Ian Phillip MD  [address]  [place], [place] [address]  [facility] [address]    Patient: Hilda Lomax  YOB: 1955  Date of Visit: 2024      Dear Dr. Phillip:    Thank you for referring Hilda Lomax to me for evaluation. Below are my notes for this consultation.    If you have questions, please do not hesitate to call me. I look forward to following your patient along with you.         Sincerely,        Nnamdi Gustafson MD        CC: No Recipients    Nnamdi Gustafson MD  2024 11:41 AM  Sign when Signing Visit  [facility]  2024    Ian Phillip MD  [address] [place], [place] [address]  [place] [address]    Re:  HILDA LOMAX  :  1955      Dear Sami:    It was my pleasure to see your patient, Hilda Lomax, in the [facility] today.      As you know, we follow him for subclinical coronary artery disease and dyslipidemia.     The patient has been through a previous cardiac workup by Dr. Santo Tucker at [facility].  A coronary calcium score was obtained in  which revealed Agatston score 174 with significant plaquing of his LAD.  This prompted a stress study which was equivocal for ischemia.  This was followed by a diagnostic left heart catheterization in 2016 which demonstrated moderate nonobstructive Mid LAD disease with negative FFR.  Medical management was indicated.    The patient has been on a prevention program over the past several years.  He is on Lipitor 10 mg daily..  His last lipid panel 2024 revealed total cholesterol 153 LDL 64 HDL 80 triglycerides 35.  This is an excellent response to Lipitor 10 mg daily.    His EKG previously demonstrated RBBB.  In reviewing the records he had an incomplete right bundle branch block in 2018 and then a complete right bundle branch block in 2019.  He underwent a stress echo which was negative for ischemia or scar.  In  his EKG reverted back to normal  conduction pattern.  Today his EKG demonstrates return of right bundle branch block.    He has been through a number of orthopedic procedures.  He is status postlaminectomy about 1 year ago.  Subsequently has undergone arthroscopic knee surgery with progressive DJD of the knees bilaterally.  He is followed by the [facility]    PAST MEDICAL HISTORY:    Past Medical History:   Diagnosis Date   • Abnormal ECG     RBBB   • Allergic rhinitis 10/1/2019   • Anxiety    • Coronary artery disease     Coronary artery disease, cardiac catheterization in November 2016, moderate stenosis involving the mid LAD, FFR negative   • DDD (degenerative disc disease), lumbar 10/1/2019   • Dyslipidemia 4/20/2018   • Essential hypertension 4/20/2018   • GERD (gastroesophageal reflux disease)    • Hyperhomocysteinemia (CMS/HCC)    • Hyperhomocystinemia 4/20/2018   • Hypertension    • Idiopathic peripheral neuropathy 10/26/2020   • Lipid disorder    • Obstructive sleep apnea    • Prothrombin mutation (CMS/HCC) 4/20/2018   • Varicose veins of legs 6/8/2017    Last Assessment & Plan:  Right lower leg VV  Ref Dr Wendy Quiroz at [facility]   • Vitamin D deficiency 1/10/2020     PAST SURGICAL HISTORY:  Retinal tear, prostate biopsy, hernia repair.  Past Surgical History   Procedure Laterality Date   • Cataract extraction     • Colonoscopy     • Esophagogastroduodenoscopy     • Hand tendon surgery Right 06/2021    thumb   • Lumbar laminectomy       CURRENT MEDICATIONS:   Current Outpatient Medications   Medication Instructions   • alpha lipoic acid 200 mg, Daily   • aspirin 81 mg, Daily   • atorvastatin (LIPITOR) 10 mg, oral, Daily   • cholecalciferol, vitamin D3, 50 mcg (2,000 unit) tablet Daily   • coenzyme Q10 (COQ10) 200 mg, Daily   • doxepin (SILENOR) 3 mg, Nightly PRN   • Lactobac no.41-Bifidobact no.7 70 mg (3 billion cell) capsule Daily   • losartan (COZAAR) 50 mg, oral, Daily   • MAGNESIUM ORAL 500 mg, Daily   • meloxicam  (MOBIC) 7.5 mg, Daily   • MULTIVIT WITH MINERALS/LUTEIN (MULTIVITAMIN 50 PLUS ORAL) Daily   • omega-3 fatty acids 1,000 mg capsule Daily   • pantoprazole (PROTONIX) 40 mg, Daily   • polysaccharide iron complex (IFEREX) 150 mg, Daily   • resveratroL 250 mg, Daily   • zolpidem (AMBIEN) 5 mg, oral, Nightly PRN     SUPPLEMENTS:  Multivitamin, omega-3 fish oil, vitamin-D, Reservitol, methyl-B essentials, probiotic, coenzyme-Q10, lipoic acid, calcium.  We reviewed his supplements in detail today.    ALLERGIES:  No known drug allergies.    FAMILY HISTORY:  Mother with hypertension, congestive heart failure.  Father with myocardial infarction, hypertension.    SOCIAL HISTORY:  He is .  His wife's name is Lakeshia Presley.  He has one child.  He is an , self-employed.  He does not smoke.  Rare alcohol.  Regular exercise.    REVIEW OF SYSTEMS:     PHYSICAL EXAMINATION:       Vitals:    12/09/24 1108   BP: 130/82   Pulse: 74   SpO2: 98%        Wt Readings from Last 3 Encounters:   12/09/24 74.8 kg (165 lb)   12/04/23 76.2 kg (168 lb)   07/17/23 72.1 kg (159 lb)       BP Readings from Last 3 Encounters:   12/09/24 130/82   12/04/23 132/78   07/17/23 128/76      HEENT:  Unremarkable.  Neck:  Supple, no JVD.  Lungs:  Clear to auscultation and percussion.  Cardiac:  Regular rate and rhythm.  Abdomen:  Soft, bowel sounds present, no organomegaly.  Extremities:  No edema, pulses intact.  Skin:  Warm and dry.  Neuro:  Alert and oriented X 3.    LABORATORY DATA:      EKG: Normal sinus rhythm with a right bundle branch block.  The patient has had intermittent right bundle branch block in the past.    Coronary Calcium score 2016 at [place]: 174 predominantly involving the mid LAD.       Cardiac catheterization 2016:  Mild to moderate non-obstructive LAD disease. FFR LAD negative.  Minor luminal irregularities LCX and RCA.  Normal LV function EF 60%.    Echocardiogram 3/14/2022:  Left Ventricle: Normal  "ventricle size. Mild concentric left ventricular hypertrophy. Preserved systolic function. Estimated EF 55%. Normal septal motion. No regional wall motion abnormalities.  Aortic Valve: Tricuspid valve. Sclerotic leaflets. No regurgitation. No stenosis. Mean gradient = 4.00 mmHg. Peak velocity = 1.49 m/s. Calculated area = 2.79 cm2.  Mitral Valve: Normal leaflet structure and normal leaflet motion. Mild to moderate regurgitation.  Left Atrium: Moderately dilated atrium.  Tricuspid Valve: Normal structure. Trace regurgitation.  Right Ventricle: Normal ventricle size. Normal systolic function.    Stress echo November 2019:  Stress echocardiogram reveals discordant results with borderline abnormal ecg but normal wall motion.  Response to Stress: A horizontal ST segment depression of 1 mm was noted during stress in the V5 and V6 leads, beginning at 11 minutes 50 seconds of stress, and returning to baseline by 1 min of recovery.  Post-stress echocardiogram does not meet criteria for ischemia. All ventricular walls become hyperdynamic and the ejection fraction improves.    [facility] Advanced lipid panel April 2019 in \"media\":  Total cholesterol 153 LDL 72 HDL 77 trig, 30 APO B 72 LP(a) 64 nmol/L  Inflammation panel: Normal  Endothelial function panel: Normal  Diabetes panel: Normal  Metabolic panel: Normal.  Vitamin D 66 homocystine 11  Omega-3 index 10.6  Sterol panel: Normal  Renal function panel: GFR 74    [facility]  12/21/21:  Lipid panel: Total cholesterol 162, LDL 69, HDL 76, triglycerides 31. ApoB 60, Lp(a) 25 mg/dL  Inflammation panel: HS CRP 0.4 LP MIGUEL ÁNGEL 2 129  Sterol balance panel: Hyper absorber of cholesterol  Diabetes panel: Normal hemoglobin A1c 5.5 insulin 6 Kacy IR 1.6 LP IR 13  Fatty acid panel: EPA 68 DHA 80 mcg/mL  Metabolic panel: Uric acid 5.2 homocysteine 10.4  Hormone panel: TSH 3.6 vitamin D 63    Basic Lipid Panels:  Component      Latest Ref Rng 6/20/2022 7/17/2023 11/29/2023 11/18/2024 "   Cholesterol      100 - 199 mg/dL 154  155  159  153    Triglycerides      0 - 149 mg/dL 42  31  36  35    HDL      >39 mg/dL 73  67  76  80    VLDL Cholesterol Timmy      5 - 40 mg/dL 10   9  9    LDL Calculated      0 - 99 mg/dL 71  82  74  64            IMPRESSIONS/RECOMMENDATIONS:  1. Coronary artery disease.  The patient has had prior cardiac catheterization which demonstrated nonobstructive LAD coronary artery disease in 2016.  His recent stress echo November 2019 demonstrated normal wall motion.  He did have borderline EKG changes at peak exercise.   He will continue baby aspirin.     2. Dyslipidemia.  Continue Lipitor.  He has reached goal.  He is a hyper absorber of sterols.  He could benefit from the addition of Zetia.    3. 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  The patient is at increased risk for progressive atherosclerosis.    4. Prothrombin mutation.  The patient is at increased risk of DVT.    5. Hyperhomocystinemia.  The patient should continue with B-complex and L5 methyl folate.    6. Obstructive sleep apnea.  The patient will continue nasal CPAP.    7. Borderline hypertension.  Patient will monitor his blood pressures at home.  Our goal is a blood pressure less than 130/80. He continues on losartan 50 mg daily.    8. Anxiety disorder.    9. GERD and irritable bowel syndrome.  Stable    10.        Intermittent right bundle branch block.  The patient has demonstrated an incomplete right bundle branch block since 2018.  He had a complete right bundle branch block in 2019.  This prompted a stress echo which was negative for ischemia in 2019.  His EKG in 2020 reverted to normal.  His EKG again demonstrates a right bundle branch block.    Summary:     Dandy is demonstrating cardiovascular stability. .      We reviewed his prevention program in detail.  We are making no substantial changes today.    We reviewed his EKGs dating back to 2016.  He had incomplete right bundle branch block in 2018.  He subsequently developed  complete bundle right bundle branch block in 2019.  A stress echo in 2019 was negative for ischemia.  In 2020 he reverted back to normal conduction.  His EKG recently has been persistent right bundle branch block.    Coronary artery disease been well worked up in the past.  Good prevention program.  He is reached his LDL and blood pressure goals.     This was a 30-minute patient encounter with greater than 50% time spent in care coordination and counseling.      Sincerely,    Nnamdi Gustafson MD  12/9/2024

## 2025-04-30 ENCOUNTER — OFFICE VISIT (OUTPATIENT)
Dept: OTOLARYNGOLOGY | Facility: CLINIC | Age: 70
End: 2025-04-30
Payer: MEDICARE

## 2025-04-30 ENCOUNTER — PROCEDURE VISIT (OUTPATIENT)
Dept: OTOLARYNGOLOGY | Facility: CLINIC | Age: 70
End: 2025-04-30
Payer: MEDICARE

## 2025-04-30 VITALS
HEIGHT: 70 IN | TEMPERATURE: 97.8 F | WEIGHT: 163 LBS | OXYGEN SATURATION: 98 % | SYSTOLIC BLOOD PRESSURE: 126 MMHG | BODY MASS INDEX: 23.34 KG/M2 | HEART RATE: 70 BPM | DIASTOLIC BLOOD PRESSURE: 70 MMHG

## 2025-04-30 DIAGNOSIS — H90.3 SENSORINEURAL HEARING LOSS (SNHL) OF BOTH EARS: Primary | ICD-10-CM

## 2025-04-30 DIAGNOSIS — H90.3 SENSORINEURAL HEARING LOSS OF BOTH EARS: Primary | ICD-10-CM

## 2025-04-30 PROBLEM — M11.261 CHONDROCALCINOSIS OF RIGHT KNEE: Status: ACTIVE | Noted: 2025-01-23

## 2025-04-30 PROBLEM — M17.11 PRIMARY OSTEOARTHRITIS OF RIGHT KNEE: Status: ACTIVE | Noted: 2025-04-30

## 2025-04-30 PROCEDURE — 99213 OFFICE O/P EST LOW 20 MIN: CPT | Performed by: OTOLARYNGOLOGY

## 2025-04-30 PROCEDURE — 99999 PR OFFICE/OUTPT VISIT,PROCEDURE ONLY: CPT | Performed by: AUDIOLOGIST

## 2025-04-30 PROCEDURE — 92557 COMPREHENSIVE HEARING TEST: CPT | Performed by: AUDIOLOGIST

## 2025-04-30 PROCEDURE — G8754 DIAS BP LESS 90: HCPCS | Performed by: OTOLARYNGOLOGY

## 2025-04-30 PROCEDURE — G8752 SYS BP LESS 140: HCPCS | Performed by: OTOLARYNGOLOGY

## 2025-04-30 PROCEDURE — 92567 TYMPANOMETRY: CPT | Performed by: AUDIOLOGIST

## 2025-04-30 RX ORDER — ESOMEPRAZOLE MAGNESIUM 40 MG/1
40 CAPSULE, DELAYED RELEASE ORAL
COMMUNITY
Start: 2025-01-30 | End: 2026-01-30

## 2025-04-30 RX ORDER — ESCITALOPRAM OXALATE 5 MG/1
5 TABLET ORAL DAILY
COMMUNITY
Start: 2025-03-18